# Patient Record
Sex: FEMALE | Race: ASIAN | NOT HISPANIC OR LATINO | Employment: PART TIME | URBAN - METROPOLITAN AREA
[De-identification: names, ages, dates, MRNs, and addresses within clinical notes are randomized per-mention and may not be internally consistent; named-entity substitution may affect disease eponyms.]

---
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
## 2019-11-25 ENCOUNTER — OFFICE VISIT (OUTPATIENT)
Dept: FAMILY MEDICINE | Facility: CLINIC | Age: 19
End: 2019-11-25
Payer: COMMERCIAL

## 2019-11-25 VITALS
WEIGHT: 142 LBS | OXYGEN SATURATION: 100 % | TEMPERATURE: 97.8 F | BODY MASS INDEX: 26.18 KG/M2 | SYSTOLIC BLOOD PRESSURE: 114 MMHG | HEART RATE: 76 BPM | DIASTOLIC BLOOD PRESSURE: 75 MMHG | RESPIRATION RATE: 16 BRPM

## 2019-11-25 DIAGNOSIS — Z23 NEED FOR VACCINATION: ICD-10-CM

## 2019-11-25 DIAGNOSIS — Z00.129 ENCOUNTER FOR ROUTINE CHILD HEALTH EXAMINATION WITHOUT ABNORMAL FINDINGS: Primary | ICD-10-CM

## 2019-11-25 DIAGNOSIS — Z01.00 EXAMINATION OF EYES AND VISION: ICD-10-CM

## 2019-11-25 NOTE — PATIENT INSTRUCTIONS
Thank you for coming to [facility] for your care. It was a pleasure to take care of you!    - Great job on taking good care of your health, KEEP IT UP!  - Try to eat more vegetables.   - Forms filled today.   - I will call you with the results of your test.    Daren Hernandez MD    11/27/19  OPHTHALMOLOGY ADULT REFERRAL    [facility]  Phone:[phone]  Fax:  [phone]    Faxed demographics and referral to [phone], they will contact patient to schedule.     Neeta Woodruff      12/17/19 ADDENDUM  Return fax from [facility] that they have not been able to reach patient. I will send a letter.     Neeta Woodruff

## 2019-11-25 NOTE — PROGRESS NOTES
"Child & Teen Check Up Year 18-20     Health History       Growth Percentile:    Wt Readings from Last 3 Encounters:   11/25/19 64.4 kg (142 lb)   06/08/18 59.9 kg (132 lb) (60 %)*   08/18/16 50.3 kg (111 lb) (26 %)*     * Growth percentiles are based on [facility] (Girls, 2-20 Years) data.      Ht Readings from Last 2 Encounters:   08/12/16 1.568 m (5' 1.75\") (17 %)*     * Growth percentiles are based on [facility] (Girls, 2-20 Years) data.    Normalized BMI data available only for age 0 to 20 years.    Visit Vitals: /75   Pulse 76   Temp 97.8  F (36.6  C) (Oral)   Resp 16   Wt 64.4 kg (142 lb)   LMP 11/25/2019   SpO2 100%   BMI 26.18 kg/m    BP Percentile: Blood pressure percentiles are not available for patients who are 18 years or older.    Informant: Patient    Patient, Family speaks English, Tory and so an  was used.  Family History:   Family History   Problem Relation Age of Onset     Cancer Father      Diabetes No family hx of      Heart Disease No family hx of        Dyslipidemia Screening:  Pediatric hyperlipidemia risk factors discussed today: No increased risk  Lipid screening performed (recommended if any risk factors): No    Social History:     Did the family/guardian worry about wether their food would run out before they got money to buy more? No  Did the family/guardian find that the food they bought didn't last long enough and they didn't have money to get more?  No    Social History     Socioeconomic History     Marital status: Single     Spouse name: None     Number of children: None     Years of education: None     Highest education level: None   Occupational History     None   Social Needs     Financial resource strain: None     Food insecurity:     Worry: None     Inability: None     Transportation needs:     Medical: None     Non-medical: None   Tobacco Use     Smoking status: Never Smoker     Smokeless tobacco: Never Used   Substance and Sexual Activity     Alcohol use: None     " Drug use: None     Sexual activity: None   Lifestyle     Physical activity:     Days per week: None     Minutes per session: None     Stress: None   Relationships     Social connections:     Talks on phone: None     Gets together: None     Attends Caodaism service: None     Active member of club or organization: None     Attends meetings of clubs or organizations: None     Relationship status: None     Intimate partner violence:     Fear of current or ex partner: None     Emotionally abused: None     Physically abused: None     Forced sexual activity: None   Other Topics Concern     None   Social History Narrative     None     Medical History: History reviewed. No pertinent past medical history.    Family History and past Medical History reviewed and unchanged/updated.      Vision Screen: Passed.  Hearing Screen: Passed.  Parental/or patient concerns: None. hre for some forms as well.     Daily Activities:  College at [employer] trying to study MA.   Lives at home parents and 5 siblings. Middle child.   Also works as a CNA.     Nutrition:    Describe intake:   BF: Doesn't eat much breakfast  Lunch: Rice, veges, meats.   Dinner: Same as lunch.      Environmental Risks:  TB exposure: No, does work in a health care facility.   Guns in house: None    STI Screening:  STI (including HIV) risk behaviors discussed today: No  HIV Screening (required once between ages 15-18 yrs): Declined by patient.   Other STI screening preformed (recommended if risk factors): No    Dental:  Have you been to a dentist this year? No and verbally encouraged family to continue to have annual dental check-up.    Mental Health:  Teen Screen Discussed?: Yes         ROS   GENERAL: no recent fevers and activity level has been normal  SKIN: Negative for rash, birthmarks, acne, pigmentation changes  HEENT: Negative for hearing problems, vision problems, nasal congestion, eye discharge and eye redness  RESP: No cough, wheezing, difficulty breathing  CV:  No cyanosis, fatigue with feeding  GI: Normal stools for age, no diarrhea or constipation   : Normal urination, no disharge or painful urination  MS: No swelling, muscle weakness, joint problems  NEURO: Moves all extremeties normally, normal activity for age  ALLERGY/IMMUNE: See allergy in history         Physical Exam:   /75   Pulse 76   Temp 97.8  F (36.6  C) (Oral)   Resp 16   Wt 64.4 kg (142 lb)   LMP 11/25/2019   SpO2 100%   BMI 26.18 kg/m     GENERAL: Alert, well nourished, well developed, no acute distress, interacts appropriately for age  SKIN: skin is clear, no rash, acne, abnormal pigmentation or lesions  HEAD: The head is normocephalic.  EYES:The conjunctivae and cornea normal. PERRL, EOMI, Light reflex is symmetric and no eye movement on cover/uncover test.   EARS: The external auditory canals are clear and the tympanic membranes are normal; gray and transluscent.  NOSE: Clear, no discharge or congestion  MOUTH/THROAT: The throat is clear, tonsils:normal, no exudate or lesions. Normal teeth without obvious abnormalities  NECK: The neck is supple and thyroid is normal, no masses  LUNGS: The lung fields are clear to auscultation,no rales, rhonchi, wheezing or retractions  HEART: The precordium is quiet. Rhythm is regular. S1 and S2 are normal. No murmurs.  ABDOMEN: The bowel sounds are normal. Abdomen soft, non tender,  non distended, no masses or hepatosplenomegaly.  EXTREMITIES: Symmetric extremities, FROM, no deformities. Spine is straight, no scoliosis  NEUROLOGIC: No focal findings. Cranial nerves grossly intact: DTR's normal. Normal gait, strength and tone         Assessment and Plan     Eh was seen today for well child c&tc and forms.    Diagnoses and all orders for this visit:    Encounter for routine child health examination without abnormal findings  Patient coming in today for a well-child visit as well as for forms feeling.  This is completed for her.  Patient also requests a TB  testing, reports that she had done this here about a year ago however unable to see results.  Will order for new TST.  -     SCREENING, VISUAL ACUITY, QUANTITATIVE, BILAT  -     SCREENING TEST, PURE TONE, AIR ONLY  -     Social-emotional screen (PHQ-9) 94461    Examination of eyes and vision  -     OPHTHALMOLOGY ADULT REFERRAL; Future    Other orders  -     TB INTRADERMAL TEST  -     HPV9 (Gardasil 9 )    No referrals were made today.    Patient Health Questionnaire - 9   No concerns. Routine follow-up.    Immunizations:    Hx immunization reactions?  No  Immunization schedule reviewed: Yes:  Following immunizations advised: As above.     Labs:  Urinalysis: once between ages 12 and 20   Hemoglobin: once for menstruating adolescents between ages 12 and 20     I, Daren Hernandez, have discussed patient findings with attending physician Guillermo Vasquez MD. who was agreeable with plan.     Schedule next visit in 2 years    Daren Hernandez MD

## 2019-11-25 NOTE — NURSING NOTE
Well child hearing and vision screening        HEARING FREQUENCY:    For conditioning purpose only  Right ear: 40db at 1000Hz: present    Right Ear:    20db at 1000Hz: present  20db at 2000Hz: present  20db at 4000Hz: present  20db at 6000Hz (11 years and older): present    Left Ear:    20db at 6000Hz (11 years and older): present  20db at 4000Hz: present  20db at 2000Hz: present  20db at 1000Hz: present    Right Ear:    25db at 500Hz: absent    Left Ear:    25db at 500Hz: absent    Hearing Screen:  Fail--Did not hear at least one tone    VISION:  Far vision: Right eye 10/16, Left eye 10/10, with no corrective lens  Plus lens (5 years and older who pass distance screening and do not have corrective lens):  Pass - blurred vision    MAC Juarez

## 2019-11-25 NOTE — PROGRESS NOTES
Preceptor Attestation:   Patient seen, evaluated and discussed with the resident. I have verified the content of the note, which accurately reflects my assessment of the patient and the plan of care.   Supervising Physician:  Real Vasquez MD.

## 2019-11-25 NOTE — LETTER
December 17, 2019       Lin  1972 [address] [address]        Dear ,    [facility] has tried contacting you to schedule the referral(s) below that your doctor submitted for you. Please call the specialities to schedule your appointments or feel free to call me back at [phone] if you need help.     [facility]  Phone: [phone]    Sincerely,    Neeta Woodruff

## 2020-11-09 DIAGNOSIS — Z11.1 SCREENING EXAMINATION FOR PULMONARY TUBERCULOSIS: Primary | ICD-10-CM

## 2020-11-09 PROCEDURE — 36415 COLL VENOUS BLD VENIPUNCTURE: CPT

## 2020-11-09 NOTE — LETTER
November 16, 2020       Lin  1972 [facility] [address]        Dear ,    We are writing to inform you of your test results.    The TB test was negative and normal.  Please follow up in the clinic as needed.     Resulted Orders   TB Quantiferon Gold Plus ([place])   Result Value Ref Range    QTF Result Negative Negative    QTF Interpretation       No interferon-gamma response to M. tuberculosis antigens was detected.  Infecton with M.   tuberculosis is unlikely.  A negative result alone does not exclude infection with M.   tuberculosis      QTF Nil 0.06 IU/mL    QTF Antigen TB1-NIL 0.00 IU/mL    QTF Antigen TB2-NIL 0.00 IU/mL    QTF Mitogen - Nil 8.36 IU/mL    Narrative    Test performed by:  [facility] [address] [place] [address]       If you have any questions or concerns, please call the clinic at the number listed above.       Sincerely,        [facility]

## 2020-11-09 NOTE — PROGRESS NOTES
[facility] presents for a blood draw TB screening test.    TB Screening questions  1. Have you had recent contact with a person with active tuberculosis (TB)?  No, continue to next question.  2. Have you ever been treated for tuberculosis (TB) or latent TB before?  No, continue to next question.  3. Has a county worker or another healthcare worker (not your employer) told you to come in to be tested for TB?  No, continue to next question.  4. Have you had a live vaccine (smallpox, flumist, MMR, varicella, oral polio and/or yellow fever) in the last 4 weeks?  No, continued with lab visit/blood draw.    Educated patient about when to expect the lab results via phone/mail/Codaricahart.    Rene Kelly CMA

## 2020-11-12 ENCOUNTER — TELEPHONE (OUTPATIENT)
Dept: FAMILY MEDICINE | Facility: CLINIC | Age: 20
End: 2020-11-12

## 2020-11-12 NOTE — TELEPHONE ENCOUNTER
[facility] phone call message- patient requesting results:    Test: Lab    Date of test: ?    Additional Comments: CALL BACK.    OK to leave a message on voice mail? Yes       Primary language: Tory      needed? No    Call taken on November 12, 2020 at 11:14 AM by Fausto Valdez

## 2020-11-13 LAB
QTF ANTIGEN TB1-NIL: 0 IU/ML
QTF ANTIGEN TB2-NIL: 0 IU/ML
QTF INTERPRETATION: NORMAL
QTF MITOGEN - NIL: 8.36 IU/ML
QTF NIL: 0.06 IU/ML
QTF RESULT: NEGATIVE

## 2021-08-04 ENCOUNTER — HOSPITAL ENCOUNTER (EMERGENCY)
Facility: HOSPITAL | Age: 21
Discharge: HOME | End: 2021-08-04
Attending: EMERGENCY MEDICINE | Admitting: EMERGENCY MEDICINE
Payer: COMMERCIAL

## 2021-08-04 VITALS
RESPIRATION RATE: 18 BRPM | HEIGHT: 63 IN | DIASTOLIC BLOOD PRESSURE: 75 MMHG | TEMPERATURE: 98.7 F | HEART RATE: 78 BPM | WEIGHT: 135 LBS | BODY MASS INDEX: 23.92 KG/M2 | SYSTOLIC BLOOD PRESSURE: 118 MMHG | OXYGEN SATURATION: 100 %

## 2021-08-04 DIAGNOSIS — T78.40XA ALLERGIC REACTION, INITIAL ENCOUNTER: ICD-10-CM

## 2021-08-04 PROCEDURE — 99283 EMERGENCY DEPT VISIT LOW MDM: CPT

## 2021-08-04 PROCEDURE — 250N000009 HC RX 250: Performed by: EMERGENCY MEDICINE

## 2021-08-04 PROCEDURE — 250N000013 HC RX MED GY IP 250 OP 250 PS 637: Performed by: EMERGENCY MEDICINE

## 2021-08-04 RX ORDER — FAMOTIDINE 20 MG/1
20 TABLET, FILM COATED ORAL 2 TIMES DAILY
Qty: 20 TABLET | Refills: 0 | Status: SHIPPED | OUTPATIENT
Start: 2021-08-04 | End: 2021-08-14

## 2021-08-04 RX ORDER — PREDNISONE 20 MG/1
40 TABLET ORAL DAILY
Qty: 10 TABLET | Refills: 0 | Status: SHIPPED | OUTPATIENT
Start: 2021-08-04 | End: 2021-08-09

## 2021-08-04 RX ORDER — FAMOTIDINE 20 MG/1
20 TABLET, FILM COATED ORAL ONCE
Status: COMPLETED | OUTPATIENT
Start: 2021-08-04 | End: 2021-08-04

## 2021-08-04 RX ORDER — DIPHENHYDRAMINE HCL 50 MG
50 CAPSULE ORAL ONCE
Status: COMPLETED | OUTPATIENT
Start: 2021-08-04 | End: 2021-08-04

## 2021-08-04 RX ORDER — DEXAMETHASONE SODIUM PHOSPHATE 4 MG/ML
10 VIAL (ML) INJECTION ONCE
Status: COMPLETED | OUTPATIENT
Start: 2021-08-04 | End: 2021-08-04

## 2021-08-04 RX ORDER — DIPHENHYDRAMINE HCL 25 MG
50 CAPSULE ORAL EVERY 6 HOURS PRN
Qty: 30 CAPSULE | Refills: 0 | Status: SHIPPED | OUTPATIENT
Start: 2021-08-04 | End: 2024-03-20

## 2021-08-04 RX ORDER — EPINEPHRINE 0.3 MG/.3ML
0.3 INJECTION SUBCUTANEOUS
Qty: 0.6 ML | Refills: 0 | Status: SHIPPED | OUTPATIENT
Start: 2021-08-04 | End: 2024-03-20

## 2021-08-04 RX ADMIN — DEXAMETHASONE SODIUM PHOSPHATE 10 MG: 4 INJECTION, SOLUTION INTRAMUSCULAR; INTRAVENOUS at 05:57

## 2021-08-04 RX ADMIN — DIPHENHYDRAMINE HCL 50 MG: 50 CAPSULE ORAL at 03:49

## 2021-08-04 RX ADMIN — FAMOTIDINE 20 MG: 20 TABLET, FILM COATED ORAL at 03:49

## 2021-08-04 ASSESSMENT — ENCOUNTER SYMPTOMS
SORE THROAT: 0
TROUBLE SWALLOWING: 0
COLOR CHANGE: 1
FACIAL SWELLING: 0

## 2021-08-04 ASSESSMENT — MIFFLIN-ST. JEOR: SCORE: 1341.49

## 2021-08-04 NOTE — ED PROVIDER NOTES
NAME: Tigre Ceballos  AGE: 21 year old female  YOB: 2000  MRN: [id number]  EVALUATION DATE & TIME: No admission date for patient encounter.    PCP: Zora Oneill    ED PROVIDER: Dion Moore M.D.      Chief Complaint   Patient presents with     Rash         FINAL IMPRESSION:  1. Allergic reaction, initial encounter        MEDICAL DECISION MAKING:    3:42 AM I met with the patient, obtained history, performed an initial exam, and discussed options and plan for diagnostics and treatment here in the ED.   5:26 AM I rechecked and updated the patient. Her rash has significantly improved. We discussed the plan for discharge and the patient is agreeable. Reviewed supportive cares, symptomatic treatment, outpatient follow up, and reasons to return to the Emergency Department. Patient to be discharged by ED RN.     Patient was clinically assessed and consented to treatment. After assessment, medical decision making and workup were discussed with the patient. The patient was agreeable to plan for testing, workup, and treatment.  Pertinent Labs & Imaging studies reviewed. (See chart for details)         Tigre Ceballos is a 21 year oldfemale who presents with rash.   Differential diagnosis includes but not limited to allergic reaction, anaphylaxis, cellulitis, food allergy.  Patient with rash since yesterday over 13 hours ago and has no airway involvement.  Examination revealed no edema, no stridor or respiratory symptoms.  Patient otherwise comfortable but does have blotchy hives all over her face, neck, chest, arms, and legs.  She does appear itchy and uncomfortable.  Benadryl given in triage and then patient examined in RP.  Will give Decadron to help with improvement in allergic reaction as well as Pepcid.  After which patient watched and some slight improvement in the itching but rash still present.  Patient from comfortable and with no respiratory symptoms felt comfortable going home.  She will be  placed on prednisone, Pepcid, and Benadryl with an EpiPen as needed.    The importance of close follow up was discussed. We reviewed warning signs and symptoms, and I instructed Ms. Ceballos to return to the emergency department immediately if she develops any new or worsening symptoms. I provided additional verbal discharge instructions. Ms. Ceballos expressed understanding and agreement with this plan of care, her questions were answered, and she was discharged in stable condition.       0 minutes of critical care time    MEDICATIONS GIVEN IN THE EMERGENCY:  Medications   diphenhydrAMINE (BENADRYL) capsule 50 mg (50 mg Oral Given 8/4/21 0349)   dexamethasone (DECADRON) injectable solution used ORALLY 10 mg (10 mg Oral Given 8/4/21 0557)   famotidine (PEPCID) tablet 20 mg (20 mg Oral Given 8/4/21 0349)       NEW PRESCRIPTIONS STARTED AT TODAY'S ER VISIT:  Discharge Medication List as of 8/4/2021  6:39 AM      START taking these medications    Details   diphenhydrAMINE (BENADRYL) 25 MG capsule Take 2 capsules (50 mg) by mouth every 6 hours as needed for itching or allergies, Disp-30 capsule, R-0, Local Print      EPINEPHrine (ANY BX GENERIC EQUIV) 0.3 MG/0.3ML injection 2-pack Inject 0.3 mLs (0.3 mg) into the muscle once as needed for anaphylaxis, Disp-0.6 mL, R-0, Local Print      famotidine (PEPCID) 20 MG tablet Take 1 tablet (20 mg) by mouth 2 times daily for 10 days, Disp-20 tablet, R-0, Local Print      predniSONE (DELTASONE) 20 MG tablet Take 2 tablets (40 mg) by mouth daily for 5 days, Disp-10 tablet, R-0, Local Print                =================================================================    HPI    Patient information was obtained from: patient    Use of : N/A         Tigre Ceballos is a 21 year old female with no relevant past medical history, who presents to the ED via private car for evaluation of a rash.     Today around 1400 (13 hours PTA) patient was at work when she began to endorse a rash that  began on her arms then quickly radiated across her entire body. She now endorses red raised hives to her face, arms, and chest. She does not believe she used any new hygiene products or ate any new foods however, she did order food to eat today. Patient notes she has eaten this food in the past but she has never had a reaction like this before. She does not endorse any itching or swelling her skin and she is able to swallow without difficulty. Patient did not take any medications for her rash and she feels like it has gradually worsened since initial onset. She denies taking any prescription medications and she follows up with her primary physician at [place]. Denies any facial swelling, mouth sores, sore throat, voice change, or any additional symptoms at this time.       REVIEW OF SYSTEMS   Review of Systems   HENT: Negative for facial swelling, mouth sores, sore throat and trouble swallowing.    Skin: Positive for color change (redness) and rash.   All other systems reviewed and are negative.       PAST MEDICAL HISTORY:  No past medical history on file.    PAST SURGICAL HISTORY:  No past surgical history on file.    CURRENT MEDICATIONS:    No current facility-administered medications for this encounter.    Current Outpatient Medications:      diphenhydrAMINE (BENADRYL) 25 MG capsule, Take 2 capsules (50 mg) by mouth every 6 hours as needed for itching or allergies, Disp: 30 capsule, Rfl: 0     EPINEPHrine (ANY BX GENERIC EQUIV) 0.3 MG/0.3ML injection 2-pack, Inject 0.3 mLs (0.3 mg) into the muscle once as needed for anaphylaxis, Disp: 0.6 mL, Rfl: 0     famotidine (PEPCID) 20 MG tablet, Take 1 tablet (20 mg) by mouth 2 times daily for 10 days, Disp: 20 tablet, Rfl: 0     predniSONE (DELTASONE) 20 MG tablet, Take 2 tablets (40 mg) by mouth daily for 5 days, Disp: 10 tablet, Rfl: 0    ALLERGIES:  No Known Allergies    FAMILY HISTORY:  Family History   Problem Relation Age of Onset     Cancer Father      Diabetes  "No family hx of      Heart Disease No family hx of        SOCIAL HISTORY:   Social History     Socioeconomic History     Marital status: Single     Spouse name: Not on file     Number of children: Not on file     Years of education: Not on file     Highest education level: Not on file   Occupational History     Not on file   Tobacco Use     Smoking status: Never Smoker     Smokeless tobacco: Never Used   Substance and Sexual Activity     Alcohol use: Not on file     Drug use: Not on file     Sexual activity: Not on file   Other Topics Concern     Not on file   Social History Narrative     Not on file     Social Determinants of Health     Financial Resource Strain:      Difficulty of Paying Living Expenses:    Food Insecurity:      Worried About Running Out of Food in the Last Year:      Ran Out of Food in the Last Year:    Transportation Needs:      Lack of Transportation (Medical):      Lack of Transportation (Non-Medical):    Physical Activity:      Days of Exercise per Week:      Minutes of Exercise per Session:    Stress:      Feeling of Stress :    Social Connections:      Frequency of Communication with Friends and Family:      Frequency of Social Gatherings with Friends and Family:      Attends [employer] Services:      Active Member of Clubs or Organizations:      Attends Club or Organization Meetings:      Marital Status:    Intimate Partner Violence:      Fear of Current or Ex-Partner:      Emotionally Abused:      Physically Abused:      Sexually Abused:        PHYSICAL EXAM:    Vitals: /75   Pulse 78   Temp 98.7  F (37.1  C) (Temporal)   Resp 18   Ht 1.6 m (5' 3\")   Wt 61.2 kg (135 lb)   LMP 06/04/2021   SpO2 100%   BMI 23.91 kg/m     Physical Exam  Vitals and nursing note reviewed.   Constitutional:       General: She is not in acute distress.     Appearance: Normal appearance. She is normal weight. She is not ill-appearing or toxic-appearing.   HENT:      Head: Normocephalic.      Nose: " Nose normal.      Mouth/Throat:      Mouth: Mucous membranes are moist.      Pharynx: Oropharynx is clear. No oropharyngeal exudate or posterior oropharyngeal erythema.   Eyes:      Extraocular Movements: Extraocular movements intact.      Conjunctiva/sclera: Conjunctivae normal.      Pupils: Pupils are equal, round, and reactive to light.   Cardiovascular:      Rate and Rhythm: Normal rate and regular rhythm.      Heart sounds: Normal heart sounds.   Pulmonary:      Effort: Pulmonary effort is normal. No respiratory distress.      Breath sounds: Normal breath sounds. No stridor. No wheezing, rhonchi or rales.   Musculoskeletal:         General: No swelling or tenderness.      Cervical back: No rigidity or tenderness.   Skin:     General: Skin is warm and dry.      Coloration: Skin is not pale.      Findings: Rash present. No erythema. Rash is urticarial.   Neurological:      General: No focal deficit present.      Mental Status: She is alert.   Psychiatric:         Mood and Affect: Mood normal.           LAB:  All pertinent labs reviewed and interpreted.  Labs Ordered and Resulted from Time of ED Arrival Up to the Time of Departure from the ED - No data to display    RADIOLOGY:  No orders to display       EKG:   None     PROCEDURES:   Procedures       I, Debbi Glez, am serving as a scribe to document services personally performed by Dr. Dion Moore  based on my observation and the provider's statements to me. I, Dion Moore MD attest that Debbi Glez is acting in a scribe capacity, has observed my performance of the services and has documented them in accordance with my direction.      Dion Moore M.D.  Emergency Medicine  [facility] EMERGENCY DEPARTMENT  [facility] [facility] [address]  [phone]  Dept: [phone]     Dion Moore MD  08/04/21 0728

## 2021-08-04 NOTE — ED TRIAGE NOTES
Female patient presents to ED with 12 hour history of generalized rash.  Rash is red and raised all over the body.  No respiratory issues.

## 2021-10-20 ENCOUNTER — OFFICE VISIT (OUTPATIENT)
Dept: FAMILY MEDICINE | Facility: CLINIC | Age: 21
End: 2021-10-20
Payer: COMMERCIAL

## 2021-10-20 VITALS
SYSTOLIC BLOOD PRESSURE: 111 MMHG | RESPIRATION RATE: 16 BRPM | WEIGHT: 142.6 LBS | TEMPERATURE: 98.8 F | OXYGEN SATURATION: 98 % | DIASTOLIC BLOOD PRESSURE: 75 MMHG | BODY MASS INDEX: 25.26 KG/M2 | HEART RATE: 89 BPM

## 2021-10-20 DIAGNOSIS — Z32.00 PREGNANCY EXAMINATION OR TEST, PREGNANCY UNCONFIRMED: ICD-10-CM

## 2021-10-20 DIAGNOSIS — Z32.00 ENCOUNTER FOR CONFIRMATION OF PREGNANCY TEST RESULT WITH PHYSICAL EXAMINATION: Primary | ICD-10-CM

## 2021-10-20 LAB — HCG UR QL: POSITIVE

## 2021-10-20 PROCEDURE — 81025 URINE PREGNANCY TEST: CPT

## 2021-10-20 PROCEDURE — 99213 OFFICE O/P EST LOW 20 MIN: CPT | Mod: GC

## 2021-10-20 RX ORDER — PRENATAL VIT/IRON FUM/FOLIC AC 27MG-0.8MG
1 TABLET ORAL DAILY
Qty: 90 TABLET | Refills: 3 | Status: SHIPPED | OUTPATIENT
Start: 2021-10-20 | End: 2022-01-18

## 2021-10-20 NOTE — PROGRESS NOTES
Assessment & Plan     Pregnancy examination or test, pregnancy unconfirmed  Encounter for confirmation of pregnancy test result with physical examination  Patient presents to confirm pregnancy. LMP was around 5/3/2021. Home pregnancy test was positive in July. She has not seen a medical provider for this pregnancy. No complications to date. Urine pregnancy test positive today. Will initiate process for full OB assessment and management plan.  -schedule first OB visit next week   -schedule OB US on Monday   -prenatal vitamins sent to pharmacy   -patient provided anticipatory guidance   - HCG qualitative urine      Return in about 1 week (around 10/27/2021) for Follow up.     Nicholas Marte DO, PGY1  [facility]    Today I precepted with Dr. Jeanne MD, who agrees with the assessment and plan.    Subjective   Eh is a 21 year old who presents for the following health issues  accompanied by her spouse.  Accompanied by father of the baby    HPI     Patient presents to confirm pregnancy. LMP was around 5/3/2021 . Was not on birth control at the time and was not trying to get pregnant. This is the first pregnancy for patient. She has not seen other healthcare providers for this pregnancy.     Patient denies hypertension, diabetes, heart disease. Reports family history of cancer in father.     Patient was taking prenatal vitamins but discontinued because she was not sure if she should be taking that particular brand.      Patient reports nausea and back pain but no vomiting, headache, abdominal pain, dysuria. She has not experienced bleeding since positive home pregnancy test. Patient craves sour and spicy foods.       Review of Systems   Constitutional, HEENT, cardiovascular, pulmonary, gi and gu systems are negative, except as otherwise noted.      Objective    /75 (BP Location: Left arm, Patient Position: Sitting, Cuff Size: Adult Regular)   Pulse 89   Temp 98.8  F (37.1  C) (Oral)   Resp  16   Wt 64.7 kg (142 lb 9.6 oz)   LMP 05/05/2021 (Approximate)   SpO2 98%   BMI 25.26 kg/m    Body mass index is 25.26 kg/m .  Physical Exam   GENERAL: healthy, alert and no distress  NECK: no adenopathy, no asymmetry, masses, or scars and thyroid normal to palpation  RESP: lungs clear to auscultation - no rales, rhonchi or wheezes  CV: regular rate and rhythm, normal S1 S2, no S3 or S4, no murmur, click or rub, no peripheral edema and peripheral pulses strong  ABDOMEN: soft, nontender, no hepatosplenomegaly, no masses and bowel sounds normal  MS: no gross musculoskeletal defects noted, no edema

## 2021-10-20 NOTE — PROGRESS NOTES
Preceptor Attestation:    I discussed the patient with the resident and evaluated the patient in person. I have verified the content of the note, which accurately reflects my assessment of the patient and the plan of care.   Supervising Physician:  Adolfo Angel MD.

## 2021-10-20 NOTE — PATIENT INSTRUCTIONS
It was great to meet you, eh. Thank you for trusting us with your care.     We will have you return for a first OB visit where we will get prenatal blood tests. This can be scheduled at the . Additionally, we will get you scheduled for an OB US sometime next week.     Congratulations on your pregnancy!     Thank you.

## 2021-10-25 ENCOUNTER — ANESTHESIA EVENT (OUTPATIENT)
Dept: OBGYN | Facility: CLINIC | Age: 21
End: 2021-10-25
Payer: COMMERCIAL

## 2021-10-25 ENCOUNTER — ANCILLARY PROCEDURE (OUTPATIENT)
Dept: ULTRASOUND IMAGING | Facility: CLINIC | Age: 21
End: 2021-10-25
Attending: FAMILY MEDICINE
Payer: COMMERCIAL

## 2021-10-25 ENCOUNTER — HOSPITAL ENCOUNTER (OUTPATIENT)
Facility: CLINIC | Age: 21
Discharge: HOME | End: 2021-10-25
Attending: OBSTETRICS & GYNECOLOGY | Admitting: OBSTETRICS & GYNECOLOGY
Payer: COMMERCIAL

## 2021-10-25 ENCOUNTER — PRE VISIT (OUTPATIENT)
Dept: MATERNAL FETAL MEDICINE | Facility: CLINIC | Age: 21
End: 2021-10-25

## 2021-10-25 ENCOUNTER — ANESTHESIA (OUTPATIENT)
Dept: OBGYN | Facility: CLINIC | Age: 21
End: 2021-10-25
Payer: COMMERCIAL

## 2021-10-25 ENCOUNTER — TRANSCRIBE ORDERS (OUTPATIENT)
Dept: MATERNAL FETAL MEDICINE | Facility: CLINIC | Age: 21
End: 2021-10-25

## 2021-10-25 ENCOUNTER — HOSPITAL ENCOUNTER (OUTPATIENT)
Facility: CLINIC | Age: 21
End: 2021-10-25
Admitting: OBSTETRICS & GYNECOLOGY
Payer: COMMERCIAL

## 2021-10-25 ENCOUNTER — HOSPITAL ENCOUNTER (OUTPATIENT)
Dept: ULTRASOUND IMAGING | Facility: CLINIC | Age: 21
End: 2021-10-25
Attending: OBSTETRICS & GYNECOLOGY
Payer: COMMERCIAL

## 2021-10-25 ENCOUNTER — OFFICE VISIT (OUTPATIENT)
Dept: MATERNAL FETAL MEDICINE | Facility: CLINIC | Age: 21
End: 2021-10-25
Attending: OBSTETRICS & GYNECOLOGY
Payer: COMMERCIAL

## 2021-10-25 ENCOUNTER — OFFICE VISIT (OUTPATIENT)
Dept: FAMILY MEDICINE | Facility: CLINIC | Age: 21
End: 2021-10-25
Payer: COMMERCIAL

## 2021-10-25 VITALS
RESPIRATION RATE: 16 BRPM | OXYGEN SATURATION: 98 % | TEMPERATURE: 97.9 F | DIASTOLIC BLOOD PRESSURE: 61 MMHG | SYSTOLIC BLOOD PRESSURE: 119 MMHG | HEART RATE: 80 BPM

## 2021-10-25 VITALS
BODY MASS INDEX: 25.65 KG/M2 | HEART RATE: 97 BPM | OXYGEN SATURATION: 98 % | WEIGHT: 144.8 LBS | TEMPERATURE: 98.2 F | RESPIRATION RATE: 16 BRPM | DIASTOLIC BLOOD PRESSURE: 63 MMHG | SYSTOLIC BLOOD PRESSURE: 101 MMHG

## 2021-10-25 DIAGNOSIS — O26.879 SHORT CERVIX AFFECTING PREGNANCY: ICD-10-CM

## 2021-10-25 DIAGNOSIS — Z91.89 AT RISK FOR DIABETES MELLITUS: ICD-10-CM

## 2021-10-25 DIAGNOSIS — O26.90 PREGNANCY RELATED CONDITION, ANTEPARTUM: ICD-10-CM

## 2021-10-25 DIAGNOSIS — O09.612 HIGH-RISK FIRST PREGNANCY OF YOUNG WOMAN, SECOND TRIMESTER: ICD-10-CM

## 2021-10-25 DIAGNOSIS — O34.32 CERVICAL INSUFFICIENCY DURING PREGNANCY, ANTEPARTUM, SECOND TRIMESTER: Primary | ICD-10-CM

## 2021-10-25 DIAGNOSIS — Z34.02 ENCOUNTER FOR SUPERVISION OF NORMAL FIRST PREGNANCY IN SECOND TRIMESTER: Primary | ICD-10-CM

## 2021-10-25 DIAGNOSIS — O26.90 PREGNANCY RELATED CONDITION, ANTEPARTUM: Primary | ICD-10-CM

## 2021-10-25 DIAGNOSIS — Z32.00 PREGNANCY EXAMINATION OR TEST, PREGNANCY UNCONFIRMED: ICD-10-CM

## 2021-10-25 DIAGNOSIS — O26.872 SHORT CERVIX DURING PREGNANCY IN SECOND TRIMESTER: Primary | ICD-10-CM

## 2021-10-25 DIAGNOSIS — O26.872 SHORT CERVIX DURING PREGNANCY IN SECOND TRIMESTER: ICD-10-CM

## 2021-10-25 DIAGNOSIS — O34.32 CERVICAL CERCLAGE SUTURE PRESENT IN SECOND TRIMESTER: Primary | ICD-10-CM

## 2021-10-25 LAB
ABO/RH(D): NORMAL
ABO/RH(D): NORMAL
ALBUMIN UR-MCNC: NEGATIVE MG/DL
AMORPH CRY #/AREA URNS HPF: ABNORMAL /HPF
ANTIBODY SCREEN: NEGATIVE
ANTIBODY SCREEN: NEGATIVE
APPEARANCE UR: ABNORMAL
BASOPHILS # BLD AUTO: 0 10E3/UL (ref 0–0.2)
BASOPHILS NFR BLD AUTO: 0 %
BILIRUB UR QL STRIP: NEGATIVE
CLUE CELLS: ABNORMAL
COLOR UR AUTO: ABNORMAL
EOSINOPHIL # BLD AUTO: 0.1 10E3/UL (ref 0–0.7)
EOSINOPHIL NFR BLD AUTO: 2 %
ERYTHROCYTE [DISTWIDTH] IN BLOOD BY AUTOMATED COUNT: 12.2 % (ref 10–15)
ERYTHROCYTE [DISTWIDTH] IN BLOOD BY AUTOMATED COUNT: 12.2 % (ref 10–15)
GLUCOSE BLD-MCNC: 73 MG/DL (ref 79–116)
GLUCOSE UR STRIP-MCNC: NEGATIVE MG/DL
HCT VFR BLD AUTO: 32.6 % (ref 35–47)
HCT VFR BLD AUTO: 33.9 % (ref 35–47)
HGB BLD-MCNC: 11.2 G/DL (ref 11.7–15.7)
HGB BLD-MCNC: 11.5 G/DL (ref 11.7–15.7)
HGB UR QL STRIP: NEGATIVE
HIV 1+2 AB+HIV1 P24 AG SERPL QL IA: NEGATIVE
IMM GRANULOCYTES # BLD: 0.1 10E3/UL
IMM GRANULOCYTES NFR BLD: 1 %
KETONES UR STRIP-MCNC: NEGATIVE MG/DL
LEUKOCYTE ESTERASE UR QL STRIP: NEGATIVE
LYMPHOCYTES # BLD AUTO: 1.3 10E3/UL (ref 0.8–5.3)
LYMPHOCYTES NFR BLD AUTO: 16 %
MCH RBC QN AUTO: 30 PG (ref 26.5–33)
MCH RBC QN AUTO: 31 PG (ref 26.5–33)
MCHC RBC AUTO-ENTMCNC: 33.9 G/DL (ref 31.5–36.5)
MCHC RBC AUTO-ENTMCNC: 34.4 G/DL (ref 31.5–36.5)
MCV RBC AUTO: 89 FL (ref 78–100)
MCV RBC AUTO: 90 FL (ref 78–100)
MONOCYTES # BLD AUTO: 0.7 10E3/UL (ref 0–1.3)
MONOCYTES NFR BLD AUTO: 8 %
MUCOUS THREADS #/AREA URNS LPF: PRESENT /LPF
NEUTROPHILS # BLD AUTO: 6 10E3/UL (ref 1.6–8.3)
NEUTROPHILS NFR BLD AUTO: 73 %
NITRATE UR QL: NEGATIVE
NRBC # BLD AUTO: 0 10E3/UL
NRBC BLD AUTO-RTO: 0 /100
PH UR STRIP: 7 [PH] (ref 5–7)
PLATELET # BLD AUTO: 195 10E3/UL (ref 150–450)
PLATELET # BLD AUTO: 213 10E3/UL (ref 150–450)
RBC # BLD AUTO: 3.61 10E6/UL (ref 3.8–5.2)
RBC # BLD AUTO: 3.83 10E6/UL (ref 3.8–5.2)
RBC URINE: 1 /HPF
SARS-COV-2 RNA RESP QL NAA+PROBE: NEGATIVE
SP GR UR STRIP: 1.02 (ref 1–1.03)
SPECIMEN EXPIRATION DATE: NORMAL
SQUAMOUS EPITHELIAL: <1 /HPF
TRICHOMONAS, WET PREP: ABNORMAL
UROBILINOGEN UR STRIP-MCNC: NORMAL MG/DL
WBC # BLD AUTO: 8.2 10E3/UL (ref 4–11)
WBC # BLD AUTO: 8.3 10E3/UL (ref 4–11)
WBC URINE: 1 /HPF
WBC'S/HIGH POWER FIELD, WET PREP: ABNORMAL
YEAST, WET PREP: ABNORMAL

## 2021-10-25 PROCEDURE — 87340 HEPATITIS B SURFACE AG IA: CPT | Performed by: STUDENT IN AN ORGANIZED HEALTH CARE EDUCATION/TRAINING PROGRAM

## 2021-10-25 PROCEDURE — 250N000011 HC RX IP 250 OP 636: Performed by: STUDENT IN AN ORGANIZED HEALTH CARE EDUCATION/TRAINING PROGRAM

## 2021-10-25 PROCEDURE — 370N000017 HC ANESTHESIA TECHNICAL FEE, PER MIN: Performed by: OBSTETRICS & GYNECOLOGY

## 2021-10-25 PROCEDURE — 76817 TRANSVAGINAL US OBSTETRIC: CPT | Performed by: RADIOLOGY

## 2021-10-25 PROCEDURE — 81001 URINALYSIS AUTO W/SCOPE: CPT | Performed by: STUDENT IN AN ORGANIZED HEALTH CARE EDUCATION/TRAINING PROGRAM

## 2021-10-25 PROCEDURE — 250N000011 HC RX IP 250 OP 636: Performed by: ANESTHESIOLOGY

## 2021-10-25 PROCEDURE — 76817 TRANSVAGINAL US OBSTETRIC: CPT | Mod: 26 | Performed by: OBSTETRICS & GYNECOLOGY

## 2021-10-25 PROCEDURE — 99204 OFFICE O/P NEW MOD 45 MIN: CPT | Mod: 25 | Performed by: OBSTETRICS & GYNECOLOGY

## 2021-10-25 PROCEDURE — 87653 STREP B DNA AMP PROBE: CPT | Performed by: STUDENT IN AN ORGANIZED HEALTH CARE EDUCATION/TRAINING PROGRAM

## 2021-10-25 PROCEDURE — 83036 HEMOGLOBIN GLYCOSYLATED A1C: CPT

## 2021-10-25 PROCEDURE — 86900 BLOOD TYPING SEROLOGIC ABO: CPT | Performed by: STUDENT IN AN ORGANIZED HEALTH CARE EDUCATION/TRAINING PROGRAM

## 2021-10-25 PROCEDURE — 85027 COMPLETE CBC AUTOMATED: CPT | Performed by: OBSTETRICS & GYNECOLOGY

## 2021-10-25 PROCEDURE — 258N000003 HC RX IP 258 OP 636: Performed by: STUDENT IN AN ORGANIZED HEALTH CARE EDUCATION/TRAINING PROGRAM

## 2021-10-25 PROCEDURE — 76805 OB US >/= 14 WKS SNGL FETUS: CPT | Performed by: RADIOLOGY

## 2021-10-25 PROCEDURE — 83655 ASSAY OF LEAD: CPT | Mod: 90 | Performed by: STUDENT IN AN ORGANIZED HEALTH CARE EDUCATION/TRAINING PROGRAM

## 2021-10-25 PROCEDURE — 258N000003 HC RX IP 258 OP 636

## 2021-10-25 PROCEDURE — 250N000009 HC RX 250: Performed by: STUDENT IN AN ORGANIZED HEALTH CARE EDUCATION/TRAINING PROGRAM

## 2021-10-25 PROCEDURE — 99000 SPECIMEN HANDLING OFFICE-LAB: CPT | Performed by: STUDENT IN AN ORGANIZED HEALTH CARE EDUCATION/TRAINING PROGRAM

## 2021-10-25 PROCEDURE — 87086 URINE CULTURE/COLONY COUNT: CPT | Performed by: STUDENT IN AN ORGANIZED HEALTH CARE EDUCATION/TRAINING PROGRAM

## 2021-10-25 PROCEDURE — 96360 HYDRATION IV INFUSION INIT: CPT

## 2021-10-25 PROCEDURE — 36415 COLL VENOUS BLD VENIPUNCTURE: CPT | Performed by: OBSTETRICS & GYNECOLOGY

## 2021-10-25 PROCEDURE — 76817 TRANSVAGINAL US OBSTETRIC: CPT

## 2021-10-25 PROCEDURE — 82947 ASSAY GLUCOSE BLOOD QUANT: CPT | Performed by: STUDENT IN AN ORGANIZED HEALTH CARE EDUCATION/TRAINING PROGRAM

## 2021-10-25 PROCEDURE — 99213 OFFICE O/P EST LOW 20 MIN: CPT | Mod: GC | Performed by: STUDENT IN AN ORGANIZED HEALTH CARE EDUCATION/TRAINING PROGRAM

## 2021-10-25 PROCEDURE — 87210 SMEAR WET MOUNT SALINE/INK: CPT | Performed by: STUDENT IN AN ORGANIZED HEALTH CARE EDUCATION/TRAINING PROGRAM

## 2021-10-25 PROCEDURE — 96361 HYDRATE IV INFUSION ADD-ON: CPT

## 2021-10-25 PROCEDURE — 250N000013 HC RX MED GY IP 250 OP 250 PS 637: Performed by: STUDENT IN AN ORGANIZED HEALTH CARE EDUCATION/TRAINING PROGRAM

## 2021-10-25 PROCEDURE — 710N000010 HC RECOVERY PHASE 1, LEVEL 2, PER MIN: Performed by: OBSTETRICS & GYNECOLOGY

## 2021-10-25 PROCEDURE — 36415 COLL VENOUS BLD VENIPUNCTURE: CPT | Performed by: STUDENT IN AN ORGANIZED HEALTH CARE EDUCATION/TRAINING PROGRAM

## 2021-10-25 PROCEDURE — 86901 BLOOD TYPING SEROLOGIC RH(D): CPT | Performed by: STUDENT IN AN ORGANIZED HEALTH CARE EDUCATION/TRAINING PROGRAM

## 2021-10-25 PROCEDURE — 360N000074 HC SURGERY LEVEL 1, PER MIN: Performed by: OBSTETRICS & GYNECOLOGY

## 2021-10-25 PROCEDURE — U0005 INFEC AGEN DETEC AMPLI PROBE: HCPCS | Performed by: STUDENT IN AN ORGANIZED HEALTH CARE EDUCATION/TRAINING PROGRAM

## 2021-10-25 PROCEDURE — 999N000141 HC STATISTIC PRE-PROCEDURE NURSING ASSESSMENT: Performed by: OBSTETRICS & GYNECOLOGY

## 2021-10-25 PROCEDURE — 85025 COMPLETE CBC W/AUTO DIFF WBC: CPT | Performed by: STUDENT IN AN ORGANIZED HEALTH CARE EDUCATION/TRAINING PROGRAM

## 2021-10-25 PROCEDURE — 87389 HIV-1 AG W/HIV-1&-2 AB AG IA: CPT | Performed by: STUDENT IN AN ORGANIZED HEALTH CARE EDUCATION/TRAINING PROGRAM

## 2021-10-25 PROCEDURE — G0123 SCREEN CERV/VAG THIN LAYER: HCPCS | Performed by: STUDENT IN AN ORGANIZED HEALTH CARE EDUCATION/TRAINING PROGRAM

## 2021-10-25 PROCEDURE — 272N000001 HC OR GENERAL SUPPLY STERILE: Performed by: OBSTETRICS & GYNECOLOGY

## 2021-10-25 PROCEDURE — 258N000003 HC RX IP 258 OP 636: Performed by: NURSE ANESTHETIST, CERTIFIED REGISTERED

## 2021-10-25 PROCEDURE — 86780 TREPONEMA PALLIDUM: CPT | Performed by: STUDENT IN AN ORGANIZED HEALTH CARE EDUCATION/TRAINING PROGRAM

## 2021-10-25 PROCEDURE — 86762 RUBELLA ANTIBODY: CPT | Performed by: STUDENT IN AN ORGANIZED HEALTH CARE EDUCATION/TRAINING PROGRAM

## 2021-10-25 PROCEDURE — G0463 HOSPITAL OUTPT CLINIC VISIT: HCPCS

## 2021-10-25 PROCEDURE — 86787 VARICELLA-ZOSTER ANTIBODY: CPT | Performed by: STUDENT IN AN ORGANIZED HEALTH CARE EDUCATION/TRAINING PROGRAM

## 2021-10-25 PROCEDURE — 76811 OB US DETAILED SNGL FETUS: CPT | Mod: 26 | Performed by: OBSTETRICS & GYNECOLOGY

## 2021-10-25 PROCEDURE — 59320 REVISION OF CERVIX: CPT | Mod: GC | Performed by: OBSTETRICS & GYNECOLOGY

## 2021-10-25 PROCEDURE — 86850 RBC ANTIBODY SCREEN: CPT | Performed by: STUDENT IN AN ORGANIZED HEALTH CARE EDUCATION/TRAINING PROGRAM

## 2021-10-25 PROCEDURE — 87591 N.GONORRHOEAE DNA AMP PROB: CPT | Performed by: STUDENT IN AN ORGANIZED HEALTH CARE EDUCATION/TRAINING PROGRAM

## 2021-10-25 RX ORDER — INDOMETHACIN 25 MG/1
25 CAPSULE ORAL EVERY 6 HOURS
Qty: 2 CAPSULE | Refills: 0 | Status: ON HOLD | OUTPATIENT
Start: 2021-10-25 | End: 2022-02-02

## 2021-10-25 RX ORDER — NALOXONE HYDROCHLORIDE 0.4 MG/ML
0.2 INJECTION, SOLUTION INTRAMUSCULAR; INTRAVENOUS; SUBCUTANEOUS
Status: DISCONTINUED | OUTPATIENT
Start: 2021-10-25 | End: 2021-10-26 | Stop reason: HOSPADM

## 2021-10-25 RX ORDER — NALOXONE HYDROCHLORIDE 0.4 MG/ML
0.4 INJECTION, SOLUTION INTRAMUSCULAR; INTRAVENOUS; SUBCUTANEOUS
Status: DISCONTINUED | OUTPATIENT
Start: 2021-10-25 | End: 2021-10-26 | Stop reason: HOSPADM

## 2021-10-25 RX ORDER — CEFAZOLIN SODIUM 2 G/100ML
2 INJECTION, SOLUTION INTRAVENOUS SEE ADMIN INSTRUCTIONS
Status: DISCONTINUED | OUTPATIENT
Start: 2021-10-25 | End: 2021-10-25

## 2021-10-25 RX ORDER — INDOMETHACIN 25 MG/1
25 CAPSULE ORAL EVERY 6 HOURS
Qty: 2 CAPSULE | Refills: 0 | Status: SHIPPED | OUTPATIENT
Start: 2021-10-25 | End: 2021-10-25

## 2021-10-25 RX ORDER — ONDANSETRON 4 MG/1
4 TABLET, ORALLY DISINTEGRATING ORAL EVERY 30 MIN PRN
Status: DISCONTINUED | OUTPATIENT
Start: 2021-10-25 | End: 2021-10-25

## 2021-10-25 RX ORDER — INDOMETHACIN 25 MG/1
25 CAPSULE ORAL EVERY 6 HOURS
Status: DISCONTINUED | OUTPATIENT
Start: 2021-10-26 | End: 2021-10-26 | Stop reason: HOSPADM

## 2021-10-25 RX ORDER — SODIUM CHLORIDE, SODIUM LACTATE, POTASSIUM CHLORIDE, CALCIUM CHLORIDE 600; 310; 30; 20 MG/100ML; MG/100ML; MG/100ML; MG/100ML
INJECTION, SOLUTION INTRAVENOUS CONTINUOUS
Status: DISCONTINUED | OUTPATIENT
Start: 2021-10-25 | End: 2021-10-25

## 2021-10-25 RX ORDER — CITRIC ACID/SODIUM CITRATE 334-500MG
30 SOLUTION, ORAL ORAL ONCE
Status: COMPLETED | OUTPATIENT
Start: 2021-10-25 | End: 2021-10-25

## 2021-10-25 RX ORDER — CEFAZOLIN SODIUM 2 G/100ML
2 INJECTION, SOLUTION INTRAVENOUS
Status: COMPLETED | OUTPATIENT
Start: 2021-10-25 | End: 2021-10-25

## 2021-10-25 RX ORDER — FENTANYL CITRATE-0.9 % NACL/PF 10 MCG/ML
PLASTIC BAG, INJECTION (ML) INTRAVENOUS CONTINUOUS PRN
Status: DISCONTINUED | OUTPATIENT
Start: 2021-10-25 | End: 2021-10-25

## 2021-10-25 RX ORDER — ACETAMINOPHEN 325 MG/1
650 TABLET ORAL EVERY 6 HOURS PRN
Qty: 60 TABLET | Refills: 0 | Status: SHIPPED | OUTPATIENT
Start: 2021-10-25 | End: 2024-03-20

## 2021-10-25 RX ORDER — BUPIVACAINE HYDROCHLORIDE 7.5 MG/ML
INJECTION, SOLUTION INTRASPINAL
Status: COMPLETED | OUTPATIENT
Start: 2021-10-25 | End: 2021-10-25

## 2021-10-25 RX ORDER — INDOMETHACIN 50 MG/1
100 SUPPOSITORY RECTAL ONCE
Status: COMPLETED | OUTPATIENT
Start: 2021-10-25 | End: 2021-10-25

## 2021-10-25 RX ORDER — METOCLOPRAMIDE HYDROCHLORIDE 5 MG/ML
10 INJECTION INTRAMUSCULAR; INTRAVENOUS ONCE
Status: COMPLETED | OUTPATIENT
Start: 2021-10-25 | End: 2021-10-25

## 2021-10-25 RX ORDER — SODIUM CHLORIDE, SODIUM LACTATE, POTASSIUM CHLORIDE, CALCIUM CHLORIDE 600; 310; 30; 20 MG/100ML; MG/100ML; MG/100ML; MG/100ML
INJECTION, SOLUTION INTRAVENOUS CONTINUOUS PRN
Status: DISCONTINUED | OUTPATIENT
Start: 2021-10-25 | End: 2021-10-25

## 2021-10-25 RX ORDER — SODIUM CHLORIDE, SODIUM LACTATE, POTASSIUM CHLORIDE, CALCIUM CHLORIDE 600; 310; 30; 20 MG/100ML; MG/100ML; MG/100ML; MG/100ML
INJECTION, SOLUTION INTRAVENOUS
Status: COMPLETED
Start: 2021-10-25 | End: 2021-10-25

## 2021-10-25 RX ORDER — CEFAZOLIN SODIUM 2 G/100ML
2 INJECTION, SOLUTION INTRAVENOUS
Status: DISCONTINUED | OUTPATIENT
Start: 2021-10-25 | End: 2021-10-25

## 2021-10-25 RX ORDER — KETOROLAC TROMETHAMINE 30 MG/ML
INJECTION, SOLUTION INTRAMUSCULAR; INTRAVENOUS PRN
Status: DISCONTINUED | OUTPATIENT
Start: 2021-10-25 | End: 2021-10-25

## 2021-10-25 RX ORDER — OXYCODONE HYDROCHLORIDE 5 MG/1
5 TABLET ORAL EVERY 4 HOURS PRN
Status: DISCONTINUED | OUTPATIENT
Start: 2021-10-25 | End: 2021-10-25

## 2021-10-25 RX ORDER — ACETAMINOPHEN 325 MG/1
650 TABLET ORAL EVERY 6 HOURS PRN
Qty: 60 TABLET | Refills: 0 | Status: SHIPPED | OUTPATIENT
Start: 2021-10-25 | End: 2021-10-25

## 2021-10-25 RX ORDER — INDOMETHACIN 25 MG/1
25 CAPSULE ORAL EVERY 6 HOURS
Qty: 3 CAPSULE | Refills: 0 | Status: SHIPPED | OUTPATIENT
Start: 2021-10-25 | End: 2021-10-25

## 2021-10-25 RX ORDER — ONDANSETRON 2 MG/ML
4 INJECTION INTRAMUSCULAR; INTRAVENOUS EVERY 30 MIN PRN
Status: DISCONTINUED | OUTPATIENT
Start: 2021-10-25 | End: 2021-10-25

## 2021-10-25 RX ORDER — ACETAMINOPHEN 325 MG/1
975 TABLET ORAL EVERY 4 HOURS PRN
Status: DISCONTINUED | OUTPATIENT
Start: 2021-10-25 | End: 2021-10-26 | Stop reason: HOSPADM

## 2021-10-25 RX ADMIN — METOCLOPRAMIDE HYDROCHLORIDE 10 MG: 5 INJECTION INTRAMUSCULAR; INTRAVENOUS at 16:59

## 2021-10-25 RX ADMIN — SODIUM CHLORIDE, POTASSIUM CHLORIDE, SODIUM LACTATE AND CALCIUM CHLORIDE: 600; 310; 30; 20 INJECTION, SOLUTION INTRAVENOUS at 16:28

## 2021-10-25 RX ADMIN — PHENYLEPHRINE HYDROCHLORIDE 100 MCG: 10 INJECTION INTRAVENOUS at 17:19

## 2021-10-25 RX ADMIN — CEFAZOLIN 2 G: 10 INJECTION, POWDER, FOR SOLUTION INTRAVENOUS at 17:05

## 2021-10-25 RX ADMIN — SODIUM CHLORIDE, POTASSIUM CHLORIDE, SODIUM LACTATE AND CALCIUM CHLORIDE: 600; 310; 30; 20 INJECTION, SOLUTION INTRAVENOUS at 16:59

## 2021-10-25 RX ADMIN — BUPIVACAINE HYDROCHLORIDE IN DEXTROSE 1.2 ML: 7.5 INJECTION, SOLUTION SUBARACHNOID at 17:08

## 2021-10-25 RX ADMIN — SODIUM CITRATE AND CITRIC ACID MONOHYDRATE 30 ML: 500; 334 SOLUTION ORAL at 16:29

## 2021-10-25 RX ADMIN — PHENYLEPHRINE HYDROCHLORIDE 50 MCG: 10 INJECTION INTRAVENOUS at 17:38

## 2021-10-25 RX ADMIN — Medication 50 MCG/MIN: at 17:16

## 2021-10-25 RX ADMIN — KETOROLAC TROMETHAMINE 30 MG: 30 INJECTION, SOLUTION INTRAMUSCULAR at 17:43

## 2021-10-25 RX ADMIN — SODIUM CHLORIDE, SODIUM LACTATE, POTASSIUM CHLORIDE, CALCIUM CHLORIDE: 600; 310; 30; 20 INJECTION, SOLUTION INTRAVENOUS at 16:28

## 2021-10-25 RX ADMIN — INDOMETHACIN 100 MG: 50 SUPPOSITORY RECTAL at 17:46

## 2021-10-25 RX ADMIN — INDOMETHACIN 25 MG: 25 CAPSULE ORAL at 22:57

## 2021-10-25 ASSESSMENT — ACTIVITIES OF DAILY LIVING (ADL)
FALL_HISTORY_WITHIN_LAST_SIX_MONTHS: NO
TOILETING_ISSUES: NO

## 2021-10-25 ASSESSMENT — ENCOUNTER SYMPTOMS: DYSRHYTHMIAS: 0

## 2021-10-25 NOTE — ANESTHESIA PROCEDURE NOTES
Intrathecal injection Procedure Note    Pre-Procedure   Staff -        Anesthesiologist:  Thu Quintero MD       Resident/Fellow: Shannan Trinidad MD       Performed By: Resident       Location: OR       Pre-Anesthestic Checklist: patient identified, IV checked, site marked, risks and benefits discussed, informed consent, monitors and equipment checked, pre-op evaluation, at physician/surgeon's request and post-op pain management  Timeout:       Correct Patient: Yes        Correct Procedure: Yes        Correct Site: Yes        Correct Position: Yes   Procedure Documentation  Procedure: intrathecal injection       Patient Position: sitting       Skin prep: Chloraprep       Insertion Site: L4-5. (midline approach).       Needle Gauge: 25.        Needle Length (Inches): 3.5        Spinal Needle Type: Marquita tip       Introducer used       Introducer: 20 G       # of attempts: 1 and  # of redirects:  3    Assessment/Narrative         Paresthesias: No.       Sensory Level: T10       CSF fluid: clear.      Opening pressure was cmH2O while  Sitting.      Medication(s) Administered   0.75% Hyperbaric Bupivacaine (Intrathecal), 1.2 mL  Medication Administration Time: 10/25/2021 5:08 PM

## 2021-10-25 NOTE — PROGRESS NOTES
Preceptor Attestation:    I discussed the patient with the resident and evaluated the patient in person. I have verified the content of the note, which accurately reflects my assessment of the patient and the plan of care.   Supervising Physician:  Sherman Quinones MD.

## 2021-10-25 NOTE — OP NOTE
Operative Note: Cervical Cerclage         Pre-Op Diagnosis:   1) Single intrauterine pregnancy at 21w6d  2) Cervical insufficiency by physical exam         Post-Op Diagnosis:   1) Same         Procedure:   1) Modified Shirodkar cervical cerclage, 1 suture (knot at 12 o'clock position)         Surgeons:   Attending: Patrick Gallegos MD  Fellow: AVI Ann Fellow         Anesthesia:   Spinal          Estimated Blood Loss:   50 cc         Findings:   1) Cervix dilated to 1 cm prior to the procedure, closed following the procedure  2) Fetal heart tones confirmed by doptone following the procedure         Specimens:   1) None         Complications:   1) None apparent          History:     Tigre Ceballos is a 21 year oldy.o.  at 21w6d by 21w6d US who presented for cerclage evaluation.  She was seen by her primary care physician where an ultrasound was performed and remarkable for a shortened cervix of 1.5 cm.  She was therefore referred to [facility] for a repeat ultrasound which was consistent with shortened cervix but at a length of 4.2 mm.  Patient was otherwise asymptomatic. After counseling regarding these findings, the patient has decided to proceed with physical exam indicated cerclage.         Details of Procedure:   After administration of spinal anesthesia the patient was placed in the dorsal lithotomy position and prepped and draped in the usual fashion.    Straight catheterization of the bladder was completed with return of yellow urine.  A weighted speculum was then placed and the cervix and vagina were again prepped with betadine under direct visualization.  The cervix was noted to be relatively short and dilated to fingertip-1 cm. Both anterior and posterior lip of the cervix were then grasped with ring forceps for assistance with mobilization.     Examination of the posterior cervix was performed and enough space was noted. Thereafter attention was turned to the anterior cervix. The vaginal mucosa just  distal to the cervicovaginal reflection was transected in a transverse fashion with Metzenbaum scissors, approximately 2 cm wide.  The bladder was the dissected off of the anterior cervix bluntly using the 's digit.    Therefore, once the bladder was sufficiently advanced, a circumferential suture of #2 Ethilon was placed high on the cervix in the newly created space with knot tied at 12 o'clock position. The position of the stitch was confirmed to be satisfactory.  The suture was tied down securely and the tail was trimmed to approximately 2 cm.  The anterior vaginal mucosa was closed with 2-0 vicryl running interlocking manner with good hemostasis.  At the end the cervix was visually and digitally closed.  A rectal exam revealed no sutures.     She was taken to the recovery room in stable condition.  Sponge and needle counts were correct at the end of the procedure.    Tabitha Browne MD  Maternal Fetal Medicine Fellow  10/25/2021 6:04 PM

## 2021-10-25 NOTE — ANESTHESIA PREPROCEDURE EVALUATION
Anesthesia Pre-Procedure Evaluation    Patient: Tigre ORTEGA Lin   MRN: [id number] : 2000        Preoperative Diagnosis: * No pre-op diagnosis entered *    Procedure : Procedure(s):  CERCLAGE, CERVIX, VAGINAL APPROACH          No past medical history on file.   No past surgical history on file.   No Known Allergies   Social History     Tobacco Use     Smoking status: Never Smoker     Smokeless tobacco: Never Used   Substance Use Topics     Alcohol use: Not on file      Wt Readings from Last 1 Encounters:   10/25/21 65.7 kg (144 lb 12.8 oz)        Anesthesia Evaluation   Pt has not had prior anesthetic         ROS/MED HX  ENT/Pulmonary:  - neg pulmonary ROS  (-) recent URI (covid neg 10/25/21)   Neurologic:  - neg neurologic ROS     Cardiovascular:  - neg cardiovascular ROS  (-) WEEKS, arrhythmias and irregular heartbeat/palpitations   METS/Exercise Tolerance:     Hematologic:  - neg hematologic  ROS     Musculoskeletal:  - neg musculoskeletal ROS     GI/Hepatic:  - neg GI/hepatic ROS  (-) GERD and liver disease   Renal/Genitourinary:  - neg Renal ROS  (-) renal disease   Endo:  - neg endo ROS  (-) Type I DM, Type II DM and thyroid disease   Psychiatric/Substance Use:  - neg psychiatric ROS  (-) psychiatric history   Infectious Disease:  - neg infectious disease ROS     Malignancy:  - neg malignancy ROS     Other:  - neg other ROS    (+) Possibly pregnant (Pregnant), ,         Physical Exam    Airway        Mallampati: II   TM distance: > 3 FB   Neck ROM: full   Mouth opening: > 3 cm    Respiratory Devices and Support         Dental  no notable dental history         Cardiovascular   cardiovascular exam normal       Rhythm and rate: regular and normal     Pulmonary   pulmonary exam normal        breath sounds clear to auscultation           OUTSIDE LABS:  CBC:   Lab Results   Component Value Date    WBC 8.3 10/25/2021    HGB 11.5 (L) 10/25/2021    HCT 33.9 (L) 10/25/2021     10/25/2021     BMP: No results found  for: NA, POTASSIUM, CHLORIDE, CO2, BUN, CR, GLC  COAGS: No results found for: PTT, INR, FIBR  POC:   Lab Results   Component Value Date    HCG Positive (A) 10/20/2021     HEPATIC: No results found for: ALBUMIN, PROTTOTAL, ALT, AST, GGT, ALKPHOS, BILITOTAL, BILIDIRECT, MAXI  OTHER: No results found for: PH, LACT, A1C, CHERYL, PHOS, MAG, LIPASE, AMYLASE, TSH, T4, T3, CRP, SED    Anesthesia Plan    ASA Status:  2, emergent    NPO Status:  ELEVATED Aspiration Risk/Unknown    Anesthesia Type: Spinal.              Consents    Anesthesia Plan(s) and associated risks, benefits, and realistic alternatives discussed. Questions answered and patient/representative(s) expressed understanding.     - Discussed with:  Patient      - Extended Intubation/Ventilatory Support Discussed: No.      - Patient is DNR/DNI Status: No    Use of blood products discussed: No .     Postoperative Care       PONV prophylaxis: Ondansetron (or other 5HT-3)     Comments:    Reglan 10 mg ordered, unable to wait 8 hours for patient to be NPO from 12 inch [facility] at noon.            Thu Quintero MD

## 2021-10-25 NOTE — ANESTHESIA CARE TRANSFER NOTE
Patient: Tigre ORTEGA Lin    Procedure: Procedure(s):  CERCLAGE, CERVIX, VAGINAL APPROACH       Diagnosis: * No pre-op diagnosis entered *  Diagnosis Additional Information: No value filed.    Anesthesia Type:   Spinal     Note:    Oropharynx: oropharynx clear of all foreign objects and spontaneously breathing  Level of Consciousness: awake  Oxygen Supplementation: room air    Independent Airway: airway patency satisfactory and stable  Dentition: dentition unchanged  Vital Signs Stable: post-procedure vital signs reviewed and stable  Report to RN Given: handoff report given  Patient transferred to: PACU    Handoff Report: Identifed the Patient, Identified the Reponsible Provider, Reviewed the pertinent medical history, Discussed the surgical course, Reviewed Intra-OP anesthesia mangement and issues during anesthesia, Set expectations for post-procedure period and Allowed opportunity for questions and acknowledgement of understanding      Vitals:  Vitals Value Taken Time   BP 95/57 10/25/21 1817   Temp 36.6  C (97.9  F) 10/25/21 1800   Pulse 79 10/25/21 1825   Resp 16 10/25/21 1815   SpO2 99 % 10/25/21 1825   Vitals shown include unvalidated device data.    Electronically Signed By: Shannan Trinidad MD  October 25, 2021  6:26 PM

## 2021-10-25 NOTE — PATIENT INSTRUCTIONS
Maternal Fetal Medicine  [address] [place] [address] [address]  [place], [place]     Phone: [phone]    APPT: TODAY, 10/25/21 at 1:30 PM consult due to short cervix.    Office is located between [facility] and [facility].  Can park anywhere in the parking lot off of [address] and need to enter in center door of [address].

## 2021-10-25 NOTE — PROGRESS NOTES
Please see full imaging report from [facility] program under imaging tab.    Thank-you for referring your patient for a comprehensive ultrasound due to suspected short cervix. She was seen for her first prenatal visit today, and has an unsure LMP, likely in , and is 21 weeks 6 days by her initial US today.    She had prenatal labs drawn at her first visit today but no aneuploidy screening has been done thus far, as she is just starting care today.     I discussed the findings on today's ultrasound with the patient and her partner. She has no known medication allergies. She has had no cervical surgery or prior pregnancies. Of note, her sister has had  deliveries.     I reviewed the normal appearing fetus on US but our concern that her cervix is very short and may even be opening, placing her at risk of very  birth, including in the periviable period. I am more concerned given her family history of premature birth as well. She did have a sterile speculum exam with her primary OB provider today and the cervix was reported as visually closed, so this was not repeated today. Although her cervix remains closed at least on US, given this very short residual closed  length, and after my review with our on call M physician Dr. Gallegos, shared-decision making with the patient led to the decision to recommend a cervical cerclage. We briefly reviewed the option for vaginal progesterone, but I worry that if this is unsuccessful, at this very short cervical length, that a cerclage may be more difficult to place. She denies any contractions or vaginal bleeding. She last ate at 1200 pm today. She and her partner are in agreement with plan to go to [facility] for evaluation for cerclage.     Labs including UA, GC, CT are all pending from her first OB visit today.     Follow up will depend on the outcome of her evaluation and potential procedure today.     Wero Nelson MD  Maternal Fetal Medicine

## 2021-10-25 NOTE — NURSING NOTE
Dr. Nelson in communication with Dr. Gallegos (on call [facility]) regarding wanting to admit patient for evaluation of possible cerclage placement.  Dr. Nelson also communicated with [facility] L&D (KELLEY Campbell) to let them know patient will be coming directly from our [facility].

## 2021-10-25 NOTE — PROGRESS NOTES
2021?     MsQuan Lin is admitted for short cervix on outside scan. Initial assessment at PCP reported CL of 1.5 cm, follow up at [facility] reported CL 4 mm. Patient reports no cramping, pain or vaginal bleeding.She is a 21 year old  who is currently?at 21 weeks and 6 days by second trimester US performed today with DESTIN 3/01/2022. Patient does not reliably recall LMP.  She has been referred for physical exam indicated cerclage.    Discussion:?     We discussed the concurrent factors that lead to  birth that include cervical shortening and dilation,  PROM and  labor. These can act?independently,?or each one can lead to the development of one of the others. Treatment is directed towards addressing each one of these risk factors.?    Placement of cervical cerclage can decrease risk of PTB prior to 32 weeks by 70%. Cerclage can prevent cervical dilation and prolapse of the BOW, however it does not prevent PROM or PTL and if these complications develop, would require removal of the cerclage. Placement of the cerclage can decrease risk for PROM by avoiding exposure of the membranes to the vaginal mileau.    Patient is aware of increased risk for recurrence in future pregnancies with increased risk of delivery  and delivery prior to 24 weeks. The interventions to prevent  delivery include: prophylactic use of 17-OH progesterone caproate from 16 to 36 weeks and use of cervical cerclage. An alternative to cerclage would be use of vaginal progesterone. I have recommended that since cervical shortening has progressed to less than 15 mm I would recommend placement of the cerclage. I have also discussed that although there is no direct intervention to prevent PROM, avoiding excessive cervical shortening can decrease the risk.    We discussed surveillance during a future pregnancy and use of measurement of CL between 18 and 24 weeks to assess risk for  recurrent  birth. CL less than 25 mm is a risk factor for  birth and would justify either option of cerclage or vaginal progesterone.?An alternative option would be to offer placement of a history indicated cerclage at 12-14 weeks.    We?also?discuss 17-OHP in a future pregnancy if  birth occurs in this pregnancy.??We reviewed?initial recommendations for use of 17-OH progesterone caproate were women with a history of spontaneous  birth from any cause. In the Meis Study from , the group identified as being at highest risk for  birth were: history of  birth prior to 32 weeks,  ethnicity or history of recurrent spontaneous  births.???     Contrasting these factors between the two studies:??     The percentage of  women was 60% in the Meis study and 6 to 7% in the PROLONG study.???     The rate of PTB <32 weeks in the Meis study was 11% in the treated group and 20% in the placebo group, while the rate of  birth at <32 weeks was 3.4-3.8 % in the PROLONG?study.???     The rate of PTB <35 weeks was 20% (progesterone) and 30% (placebo) in the Meis study, while in the PROLONG study it was 11% for both groups.???     The mean gestational age at qualifying delivery for the Meis study was 30-31 weeks while in the PROLONG study it was 32-33 weeks.???     There?appear to be?two different populations with different risk profiles, and the main differences appear to be the ethnicity of the study population and risk for PTB based a priori risk for  birth, and gestational age of qualifying birth. If there is a benefit to use,?it?is probably?greater among women with earlier and higher number of  births, as well as those of  descent.??     At conclusion of our conversation, patient?elected?to proceed with cerclage today.    Recommendations:?     1. NPO  2. Swab for GC, CT, GBS and wet prep  3. Indomethacin 100 mg DC  followed by 25 mg po every 6 hours  4. Cephalosporin 2 grams on call to OR    Patrick Gallegos M.D.  Maternal Fetal Medicine      ?     ??

## 2021-10-25 NOTE — PROGRESS NOTES
Assessment & Plan     1. Encounter for supervision of normal first pregnancy in second trimester  2. Short cervix affecting pregnancy  22 yo  at about 22 weeks based on 22 week ultrasound today. Found to have shortened cervix to 1.4 cm with funneling. Feeling well without signs/symptoms of infection or impending delivery. Cervix closed on exam.  She denies any subjective contractions, but concern for contractions during ultrasound because of changing cervix length. Gayle BENSON communicated with [facility] and scheduled an appointment today for further evaluation and possible cerclage versus progesterone supplementation. Has first OB visit scheduled for 10/27 with Dr. Marte.   - ABO/Rh Type-HML; Future  - Antibody Screen; Future  - Chlamydia/Gono Amplified  - CBC with Plt; Future  - Culture Urine; Future  - Hepatitis B Surface Ag; Future  - HIV Ag/Ab Screen Cascade; Future  - Lead, Blood; Future  - Rubella Antibody IgG; Future  - Syphilis Screen Cascade; Future  - Glucose whole blood; Future  - ABO/Rh Type-HML  - Antibody Screen  - CBC with Plt  - Culture Urine  - Hepatitis B Surface Ag  - HIV Ag/Ab Screen Cascade  - Lead, Blood  - Rubella Antibody IgG  - Syphilis Screen Cascade  - Glucose whole blood  - Wet preparation  - GYN Cytology  - Mat Fetal Med Ctr Referral - Pregnancy; Future      Discussion of management or test interpretation with external physician/other qualified healthcare professional/appropriate source - [facility]  Ordering of each unique test  45 minutes spent on the date of the encounter doing chart review, history and exam, documentation and further activities per the note    Felicitas Sewell MD PGY3  [facility]    Discussed case with Dr. Quinones.     Subjective   Eh is a 21 year old who presents for the following health issues  accompanied by her .    HPI     Presents for dating ultrasound today. On ultrasound was found to have shortened cervix (1.4 cm) with funneling.     She  initially reported LMP as 5/3/21. However, today she states she thinks it was probably in June. DESTIN based on verbal report for radiology tech today is that she is around 22 weeks pregnant.     She denies any bleeding, spotting, contractions, abdominal pain, nausea, vomiting, vaginal discharge, dyuria. She has not been working or exercising since she found out that she was pregnant. Reports that she otherwise feels normal.     This is her first pregnancy.     Review of Systems   Constitutional, HEENT, cardiovascular, pulmonary, gi and gu systems are negative, except as otherwise noted.      Objective    /63 (BP Location: Right arm, Patient Position: Sitting, Cuff Size: Adult Regular)   Pulse 97   Temp 98.2  F (36.8  C) (Oral)   Resp 16   Wt 65.7 kg (144 lb 12.8 oz)   SpO2 98%   BMI 25.65 kg/m    Body mass index is 25.65 kg/m .  Physical Exam   GENERAL: healthy, alert and no distress  RESP: lungs clear to auscultation - no rales, rhonchi or wheezes  CV: regular rate and rhythm, normal S1 S2, no S3 or S4, no murmur, click or rub, no peripheral edema and peripheral pulses strong  ABDOMEN: soft, nontender, no hepatosplenomegaly, gravid uterus   (female): normal female external genitalia, normal urethral meatus , vaginal mucosa pink, moist, well rugated, white physiologic vaginal discharge, closed cervix. Small amount of bleeding from cervical transition zone after pap collected.   MS: no gross musculoskeletal defects noted, no edema  PSYCH: mentation appears normal, affect normal/bright    Results for orders placed or performed in visit on 10/25/21 (from the past 24 hour(s))   CBC with Plt   Result Value Ref Range    WBC Count 8.3 4.0 - 11.0 10e3/uL    RBC Count 3.83 3.80 - 5.20 10e6/uL    Hemoglobin 11.5 (L) 11.7 - 15.7 g/dL    Hematocrit 33.9 (L) 35.0 - 47.0 %    MCV 89 78 - 100 fL    MCH 30.0 26.5 - 33.0 pg    MCHC 33.9 31.5 - 36.5 g/dL    RDW 12.2 10.0 - 15.0 %    Platelet Count 195 150 - 450 10e3/uL    Glucose whole blood   Result Value Ref Range    Glucose Whole Blood 73 (L) 79 - 116 mg/dL

## 2021-10-25 NOTE — PROGRESS NOTES
2021?     HPI:   MsBranden?Eh Lin is admitted for short cervix on outside scan. Initial assessment at PCP reported CL of 1.5 cm, follow up at [facility] reported CL 4 mm. Patient reports no cramping, pain, fluid leakage or vaginal bleeding. She is a 21 year old  who is currently?at 21 weeks and 6 days by second trimester US performed today with DESTIN 3/01/2022. Patient does not reliably recall LMP. She has been referred for physical exam indicated cerclage.     Pt reports she does not have any chronic medical conditions, is only taking a daily prenatal vitamin. She states she has thus far felt well during this pregnancy, specifically denies any nausea, vomiting, indigestion, or dysuria. She has no past surgical history. Does affirm a family history of diabetes and hypertension in her mother and an unknown type of cancer in her father. Pt states she does not smoke cigarettes, use tobacco, or any other recreational substances. Pt reports she has not drank any alcohol since becoming pregnant.     ROS: Negative except as noted in HPI.     Physical Exam:   General: Healthy, young female in no acute distress  Respiratory: Non-labored breathing on room air  Extremities: No lower extremity edema bilaterally  Pelvic: External genitalia appear normal. On speculum exam, there was some yeast noted in the vaginal canal but it otherwise appeared normal. Vaginal discharge was milky-white without odor and appeared normal. Her cervix appeared to be slightly dilated, but externally was normal without any concerning lesions or inflammation.     Labs:   Component      Latest Ref Rng & Units 10/25/2021   WBC      4.0 - 11.0 10e3/uL 8.3   RBC Count      3.80 - 5.20 10e6/uL 3.83   Hemoglobin      11.7 - 15.7 g/dL 11.5 (L)   Hematocrit      35.0 - 47.0 % 33.9 (L)   MCV      78 - 100 fL 89   MCH      26.5 - 33.0 pg 30.0   MCHC      31.5 - 36.5 g/dL 33.9   RDW      10.0 - 15.0 % 12.2   Platelet Count      150 -  450 10e3/uL 195     Component      Latest Ref Rng & Units 10/25/2021   Glucose      79 - 116 mg/dL 73 (L)     Component      Latest Ref Rng & Units 10/25/2021   Trichomonas      Absent Absent   Yeast      Absent Absent   Clue cells      Absent Absent   WBCs/high power field      None 1+ (A)   COVID-19 testing: pending.  UA with culture: pending.  ABO/RH type: pending.     Imaging:     U/S (10/25/21):   IMPRESSION:    1.  Single living intrauterine gestation.  2.  Based on this ultrasound, composite age of 21 weeks 6 days with EDC 3/1/22. Any earlier imaging is not available at this time.  3.  Normal fetal survey.  4.  Shortened cervix with funneling. 1.4 cm cervical length.  Fetal Heart Rate: 147 bpm    MFM  U/S Comprehensive (10/25/21):   IMPRESSION:  1) Theodore intrauterine pregnancy at 21w 6d gestational age as dated by her first US today with DESTIN 2022. She had an unsure LMP.  2) None of the anomalies commonly detected by ultrasound were evident in the detailed fetal anatomic survey as described above.  3) Growth parameters and estimated fetal weight were consistent with established dates.  4) The amniotic fluid volume appeared normal.  5) Normal fetal activity for gestational age.  6) Transvaginal imaging was performed due to suspected short cervix. The cervix is noted to be short with only 4.2 mm of residual closed cervix but funneled cervical length  superior to this.    Discussion:?     We discussed the concurrent factors that lead to  birth that include cervical shortening and dilation,  PROM and  labor. These can act?independently,?or each one can lead to the development of one of the others. Treatment is directed towards addressing each one of these risk factors.?    Placement of cervical cerclage can decrease risk of PTB prior to 32 weeks by 70%. Cerclage can prevent cervical dilation and prolapse of the BOW, however it does not prevent PROM or PTL and if these complications  develop, would require removal of the cerclage. Placement of the cerclage can decrease risk for PROM by avoiding exposure of the membranes to the vaginal mileau.    Patient is aware of increased risk for recurrence in future pregnancies with increased risk of delivery  and delivery prior to 24 weeks. The interventions to prevent  delivery include: prophylactic use of 17-OH progesterone caproate from 16 to 36 weeks and use of cervical cerclage. An alternative to cerclage would be use of vaginal progesterone. I have recommended that since cervical shortening has progressed to less than 15 mm I would recommend placement of the cerclage. I have also discussed that although there is no direct intervention to prevent PROM, avoiding excessive cervical shortening can decrease the risk.    We discussed surveillance during a future pregnancy and use of measurement of CL between 18 and 24 weeks to assess risk for recurrent  birth. CL less than 25 mm is a risk factor for  birth and would justify either option of cerclage or vaginal progesterone.?An alternative option would be to offer placement of a history indicated cerclage at 12-14 weeks.    We?also?discuss 17-OHP in a future pregnancy if  birth occurs in this pregnancy.??We reviewed?initial recommendations for use of 17-OH progesterone caproate were women with a history of spontaneous  birth from any cause. In the Meis Study from , the group identified as being at highest risk for  birth were: history of  birth prior to 32 weeks,  ethnicity or history of recurrent spontaneous  births.???     Contrasting these factors between the two studies:??     The percentage of  women was 60% in the Meis study and 6 to 7% in the PROLONG study.???     The rate of PTB <32 weeks in the Meis study was 11% in the treated group and 20% in the placebo group, while the rate of  birth at <32  weeks was 3.4-3.8 % in the PROLONG?study.???     The rate of PTB <35 weeks was 20% (progesterone) and 30% (placebo) in the Meis study, while in the PROLONG study it was 11% for both groups.???     The mean gestational age at qualifying delivery for the Meis study was 30-31 weeks while in the PROLONG study it was 32-33 weeks.???     There?appear to be?two different populations with different risk profiles, and the main differences appear to be the ethnicity of the study population and risk for PTB based a priori risk for  birth, and gestational age of qualifying birth. If there is a benefit to use,?it?is probably?greater among women with earlier and higher number of  births, as well as those of  descent.??     At conclusion of our conversation, patient?elected?to proceed with cerclage today.    Recommendations:?     1. NPO  2. Swab for GC, CT, GBS and wet prep  3. Indomethacin 100 mg ND followed by 25 mg po every 6 hours  4. Cephalosporin 2 grams on call to OR  5. COVID-19 PCR    Patrick Gallegos  Date of Service (when I saw the patient): 10/25/21    Time Spent on this Encounter   I, Patrick Gallegos, spent a total of 45 minutes bedside and on the inpatient unit today managing the care of Tigre Ceballos.  Over 50% of my time on the unit was spent counseling the patient and /or coordinating care regarding placement of cerclage. See note for details.      ?     ??

## 2021-10-25 NOTE — PROGRESS NOTES
{PROVIDER CHARTING PREFERENCE:919061}    Subjective   Eh is a 21 year old who presents for the following health issues {ACCOMPANIED BY STATEMENT (Optional):265983}    HPI   Not    {SUPERLIST (Optional):846400}  {additonal problems for provider to add (Optional):006617}    Review of Systems   {ROS COMP (Optional):980951}      Objective    /63 (BP Location: Right arm, Patient Position: Sitting, Cuff Size: Adult Regular)   Pulse 97   Temp 98.2  F (36.8  C) (Oral)   Resp 16   Wt 65.7 kg (144 lb 12.8 oz)   SpO2 98%   BMI 25.65 kg/m    Body mass index is 25.65 kg/m .  Physical Exam   {Exam List (Optional):277705}    {Diagnostic Test Results (Optional):049024}    {AMBULATORY ATTESTATION (Optional):582194}

## 2021-10-26 PROBLEM — O26.879 SHORT CERVIX AFFECTING PREGNANCY: Status: RESOLVED | Noted: 2021-10-25 | Resolved: 2021-10-26

## 2021-10-26 LAB
BACTERIA UR CULT: NO GROWTH
GP B STREP DNA SPEC QL NAA+PROBE: NEGATIVE
HBV SURFACE AG SERPL QL IA: NONREACTIVE
N GONORRHOEA DNA SPEC QL NAA+PROBE: NEGATIVE
PATIENT PENICILLIN, AMOXICILLIN, CEPHALOSPORINS ALLERGY: NO
RUBV IGG SERPL QL IA: POSITIVE
T PALLIDUM AB SER QL: NEGATIVE

## 2021-10-26 NOTE — ANESTHESIA POSTPROCEDURE EVALUATION
Patient: Tigre Ceballos    Procedure: Procedure(s):  CERCLAGE, CERVIX, VAGINAL APPROACH       Diagnosis:* No pre-op diagnosis entered *  Diagnosis Additional Information: No value filed.    Anesthesia Type:  Spinal    Note:  Disposition: Outpatient   Postop Pain Control: Uneventful            Sign Out: Well controlled pain   PONV: No   Neuro/Psych: Uneventful            Sign Out: Acceptable/Baseline neuro status   Airway/Respiratory: Uneventful            Sign Out: Acceptable/Baseline resp. status   CV/Hemodynamics: Uneventful            Sign Out: Acceptable CV status; No obvious hypovolemia; No obvious fluid overload   Other NRE: NONE   DID A NON-ROUTINE EVENT OCCUR? No           Last vitals:  Vitals Value Taken Time   /60 10/25/21 1900   Temp 36.6  C (97.9  F) 10/25/21 1800   Pulse 80 10/25/21 1900   Resp 16 10/25/21 1845   SpO2 99 % 10/25/21 1900     Patient Vitals for the past 24 hrs:   BP Temp Temp src Pulse Resp SpO2   10/25/21 2015 119/61 -- -- -- 16 98 %   10/25/21 1900 106/60 -- -- 80 -- 99 %   10/25/21 1845 100/61 -- -- 83 16 99 %   10/25/21 1830 95/62 -- -- 77 16 99 %   10/25/21 1815 95/57 -- -- 75 16 99 %   10/25/21 1800 99/43 36.6  C (97.9  F) Oral -- 16 100 %   10/25/21 1755 99/40 -- -- -- 16 --   10/25/21 1540 107/59 36.9  C (98.4  F) Oral -- 16 --       Electronically Signed By: Thu Quintero MD  October 25, 2021  9:57 PM

## 2021-10-26 NOTE — DISCHARGE INSTRUCTIONS
Discharge Instruction for Undelivered Patients      You were seen for: Cerclage  We Consulted: Dr. Gallegos  You had (Test or Medicine): Fetal monitoring and cerclage placement     Diet:   Drink 8 to 12 glasses of liquids (milk, juice, water) every day.  You may eat meals and snacks.     Activity:  Rest the pelvic area. No sex. Do not stimulate breasts or nipples.  Call your doctor or nurse midwife if your baby is moving less than usual.     Call your provider if you notice:  Swelling in your face or increased swelling in your hands or legs.  Headaches that are not relieved by Tylenol (acetaminophen).  Changes in your vision (blurring: seeing spots or stars.)  Nausea (sick to your stomach) and vomiting (throwing up).   Weight gain of 5 pounds or more per week.  Heartburn that doesn't go away.  Signs of bladder infection: pain when you urinate (use the toilet), need to go more often and more urgently.  The bag of vaughn (rupture of membranes) breaks, or you notice leaking in your underwear.  Bright red blood in your underwear.  Abdominal (lower belly) or stomach pain.  For first baby: Contractions (tightening) less than 5 minutes apart for one hour or more.  Second (plus) baby: Contractions (tightening) less than 10 minutes apart and getting stronger.  *If less than 34 weeks: Contractions (tightening) more than 6 times in one hour.  Increase or change in vaginal discharge (note the color and amount)    Follow-up:  As scheduled in the clinic   Medication at your pharmacy in the morning

## 2021-10-26 NOTE — PLAN OF CARE
Pt arrived from clinic, admission complete, IV placed and prepped for surgery. Cerclage placed at 1730, pt tolerated well. Pt stable post-op, able to tolerate eating and drinking, pt up to ambulate and void at 2230 when numbness worn off. 0000 scheduled indocin dose given at 2300 with provider's approval. Discharge instructions given, IV removed and pt discharged ambulatory at 2305.

## 2021-10-26 NOTE — OR NURSING
Pt to PACU via cart.  VSS, temp 97.9, LR infusing by gravity to piv without complications. denies pain and nausea. I)IV to pump, compression to pneumoboots re-started.  A) Stable P) pt to inform RN if she experiences pain or nausea.  Post op cares.   Anticipate transfer to room 456 at ~1hr post op.

## 2021-10-27 ENCOUNTER — ALLIED HEALTH/NURSE VISIT (OUTPATIENT)
Dept: FAMILY MEDICINE | Facility: CLINIC | Age: 21
End: 2021-10-27
Payer: COMMERCIAL

## 2021-10-27 ENCOUNTER — OFFICE VISIT (OUTPATIENT)
Dept: FAMILY MEDICINE | Facility: CLINIC | Age: 21
End: 2021-10-27
Payer: COMMERCIAL

## 2021-10-27 VITALS
TEMPERATURE: 98.3 F | HEART RATE: 97 BPM | WEIGHT: 147 LBS | DIASTOLIC BLOOD PRESSURE: 69 MMHG | SYSTOLIC BLOOD PRESSURE: 111 MMHG | RESPIRATION RATE: 16 BRPM | OXYGEN SATURATION: 96 % | BODY MASS INDEX: 26.04 KG/M2

## 2021-10-27 DIAGNOSIS — O26.872 SHORT CERVIX DURING PREGNANCY IN SECOND TRIMESTER: ICD-10-CM

## 2021-10-27 DIAGNOSIS — O09.32 LATE PRENATAL CARE AFFECTING PREGNANCY IN SECOND TRIMESTER: ICD-10-CM

## 2021-10-27 DIAGNOSIS — O09.612 HIGH-RISK FIRST PREGNANCY OF YOUNG WOMAN, SECOND TRIMESTER: Primary | ICD-10-CM

## 2021-10-27 DIAGNOSIS — O09.90 SUPERVISION OF HIGH RISK PREGNANCY, ANTEPARTUM: ICD-10-CM

## 2021-10-27 DIAGNOSIS — Z91.89 AT RISK FOR DIABETES MELLITUS: ICD-10-CM

## 2021-10-27 DIAGNOSIS — O09.92 HIGH-RISK PREGNANCY IN SECOND TRIMESTER: ICD-10-CM

## 2021-10-27 DIAGNOSIS — O09.612 HIGH-RISK FIRST PREGNANCY OF YOUNG WOMAN, SECOND TRIMESTER: ICD-10-CM

## 2021-10-27 DIAGNOSIS — Z23 NEED FOR PROPHYLACTIC VACCINATION AND INOCULATION AGAINST INFLUENZA: ICD-10-CM

## 2021-10-27 DIAGNOSIS — O09.529 SUPERVISION OF HIGH-RISK PREGNANCY OF ELDERLY MULTIGRAVIDA: Primary | ICD-10-CM

## 2021-10-27 DIAGNOSIS — O34.32 CERVICAL CERCLAGE SUTURE PRESENT IN SECOND TRIMESTER: ICD-10-CM

## 2021-10-27 LAB
BKR LAB AP GYN ADEQUACY: NORMAL
BKR LAB AP GYN INTERPRETATION: NORMAL
BKR LAB AP HPV REFLEX: NORMAL
BKR LAB AP PREVIOUS ABNORMAL: NORMAL
HBA1C MFR BLD: 4.8 % (ref 0–5.6)
PATH REPORT.COMMENTS IMP SPEC: NORMAL
PATH REPORT.RELEVANT HX SPEC: NORMAL

## 2021-10-27 PROCEDURE — 99207 PR NO CHARGE NURSE ONLY: CPT

## 2021-10-27 PROCEDURE — 87491 CHLMYD TRACH DNA AMP PROBE: CPT

## 2021-10-27 PROCEDURE — 99213 OFFICE O/P EST LOW 20 MIN: CPT | Mod: 25

## 2021-10-27 PROCEDURE — 90471 IMMUNIZATION ADMIN: CPT

## 2021-10-27 PROCEDURE — 90686 IIV4 VACC NO PRSV 0.5 ML IM: CPT

## 2021-10-27 NOTE — PROGRESS NOTES
Past Medical History     Do you have a history of any of the following medical conditions?    Condition No/Yes/Details   Hypertension No    Heart disease, mitral valve prolapse, rheumatic fever No    Asthma or another chronic lung disease No    An autoimmune disorder No    Kidney disease No    Frequent urinary tract infections No    Epilepsy, seizures, or spells No    Migraine headaches No    Stroke, loss of sensation/function, seizures, or other neuro problem No    Diabetes No    Thyroid problems or have you taken thyroid medication No    Hepatitis, liver disease, jaundice No    Blood clots, phlebitis, pulmonary embolism or varicose veins No    Excessive bleeding after surgery or dental work No    Do you have more bleeding than other women after a cut or a scratch? No    Anemia No    Blood transfusions No         Would you refuse a blood transfusion?       No   If yes, then ask next question. If no, skip next question.   Would you rather die than receive a blood transfusion? No    Breast problems No    Have you ever ? No plans to breastfeed   Abnormalities of the uterus  YES cervical incompetence   Abnormal pap smear No    Have you ever been treated for depression? No    Are you having problems with crying spells or loss of self-esteem? No    Have you ever required psychiatric care? No    Have you been physically, sexually, or emotionally hurt by someone? No    Have you been in a major accident or suffered serious trauma? No    Have you ever had any gynecological surgical procedures such as cervical conization, LEEP, laser treatment, cryosurgery of the cervix, or a dilatation and curettage?  YES had cerclage placed on 10/25/21   Have you had any other surgical procedures? No         Have you ever had any complications from anesthesia? No    Have you ever been hospitalized for a nonsurgical reason? No             Substance use and exposure     Does anyone in your home smoke? No    Do you use tobacco  products or betel nut? No    Do you drink beer, wine, or hard liquor? No    Do you use any of the following: marijuana, speed, cocaine, heroin, hallucinogens, or other drugs? No               Symptoms since Last Menstrual Period     Do you currently have any of the following symptoms: No/Yes/Details        Abdominal pain  YES mild cramping after cerclage        Blood in the stools or urine No         Chest pain No         Shortness of breath No         coughing or vomiting up blood No         Your heart racing or skipping beats No         Nausea or vomiting No         Pain on urination No         Vaginal discharge or bleeding  YES vaginal spotting red since cerclage placed 10/25               Genetic Screening          Is the patient 35 years or older? No      Do you have a history of any of the following No/Yes/Details        A metabolic disorder (e.g. Insulin-dependent DM, PKU) No         Recurrent pregnancy loss or still birth No      Do you, the baby's father, or anyone in your families have          Thalassemia AND MCV <80 No         Hemophilia No         Neural tube defect No }        Congenital heart defect No         Sickle cell disease or trait No         Muscular dystrophy No         Cystic fibrosis No         Mental retardation or autism No         Down's syndrome No         Darius-Sach's disease No         [place]'s chorea No         Any other inherited genetic or chromosomal disorder No         A child with birth defects not listed above No                Infection History     Ever treated for tuberculosis or had a positive skin test No    Ever had genital herpes (or has your partner) No    Had a rash or viral illness since LMP No    Ever had a sexually transmitted infection No    Ever had chicken pox or the vaccine No    Have you had a sexual partner who is HIV positive No    Ever had any other serious infectious disease No                Risk Assessment     Average Risk Category  No significant  risk factors: Yes    At Risk Category (up to 3)  Teen pregnancy: No  Poor social situation: No  Domestic abuse: No  Financial difficulties: No  Smoker: No  H/O  deliver: No  H/O drug abuse: No  Non-English speaking: No  Advanced maternal age: No  GDM risks: No  Previous C/S: No  H/O PIH: No  H/O STIs: No  H/O mental health concerns: No  Onset care > 20 weeks: No    High Risk Category (4 or more At Risk or)  Diabetes/GDM: No  Multiple gestation: No  Chronic hypertension: No  Significant hx of asthma: No  Fetal demise > 20 weeks: No  Positive tox screen: No  Current mental health treatment: No  Other: cervical incompetence and cerclage    Risk: High Risk   Date determined: 10/27/21

## 2021-10-27 NOTE — PROGRESS NOTES
First Obstetric Visit           Assessment and Plan     Eh was seen today for prenatal care and imm/inj.  Diagnoses and all orders for this visit:  High-risk first pregnancy of young woman, second trimester  -     Hemoglobin A1c; Future  -     Chlamydia trachomatis PCR; Future  -     US OB >14 Weeks Follow Up; Future  -     Chlamydia trachomatis PCR  At risk for diabetes mellitus  -     Hemoglobin A1c; Future  Need for prophylactic vaccination and inoculation against influenza  -     INFLUENZA VACCINE IM > 6 MONTHS VALENT IIV4 (AFLURIA/FLUZONE)  High-risk pregnancy in second trimester  21 year old  , 22w1d weeks of pregnancy with DESTIN of Mar 1, 2022 by LMP of Patient's last menstrual period was 2021 (approximate)..    Pregnancy Risk Assessment: High Risk pregnancy  Discussed high risk conditions as follows:cervical insufficiency  -Dating US obtained and dating confirmed?   Yes 10/25/2021  -Taking PNV/Folate?     Yes   - Ordered new OB labs: blood type and antibody screen, HIV, VDRL, hep B, rubella, UA/UC, gonorrhea/chlamydia, Pap smear, Hepatitis B immunity, Varicella immunity and Wet prep done 10/25. Added varicella today. Will follow up with results.   -Discussed risks and benefits of first trimester screen for trisomies, patient declined or was too late in gestational age for this test.   - Discussed genetic screening. Patient does want to pursue screening.   - Discussed screening for sickle cell anemia. Patient does not  want to pursue Hb electrophoresis.   - Discussed early screening for gestational diabetes. The patient does not have a history of GDM, BMI>30, h/o prediabetes/glucose intolerance, first degree relative with GDM or DM, or chronic HTN, so WILL obtain early GCT or A1c.  - The patient does not h/o severe, early preeclampsia with delivery <34weeks, preeclampsia in more than one pregnancy, pre-gestational diabetes, chronic hypertension, renal disease, or autoimmune disease so WILL NOT start  low dose aspirin (81mg) and calcium supplementation (1g-1.5g/day elemental = 2500mg- 3750mg/day Calcium carbonate) to prevent preeclampsia.  - The patient  does have risk factor of spontaneous  birth so  WILL consider starting at 16-20 weeks  serial transvaginal cervical length ultrasounds from 16-24 weeks.  - Prenatal vitamins ordered.  - Flu shot offered and was Accepted.  Counseling given:   - Follow up in 4 weeks for return OB visit.  - Recommended weight gain for pregnancy: 11-20 lbs (pregravid BMI >30)  - Instructed on best evidence for: healthy diet and foods to avoid; exercise and activity during pregnancy; avoiding exposure to toxoplasmosis; safe use of seatbelts during pregnancy; and maintenance of a generally healthy lifestyle  -Patient to see OB educator/ RN today and/or next visit.    Discussed the harms, benefits, side effects and alternative therapies for current prescribed and OTC medications.      Patient Instructions   Patient Education     Adapting to Pregnancy: First Trimester  As your body adjusts during your first trimester of pregnancy, you may have to change or limit your daily activities. You ll need more rest. You may also need to use the energy you have more wisely.   Your changing body  Almost every part of your body is affected as you adapt to pregnancy. The uterus and cervix will start to soften right away. You may not look very pregnant during the first 3 months. But you are likely to have some common signs of early pregnancy:     Nausea    Fatigue    Frequent urination    Mood swings    Bloating of the belly    Constipation    Heartburn    Missed or light periods (first trimester bleeding)    Nipple or breast tenderness and breast swelling  It s not too late to start good habits   What matters most is protecting your baby from this moment on. If you smoke, drink alcohol, or use drugs, now is the time to stop. If you need help, talk with your healthcare provider:     Smoking  increases the risk of stillbirth or having a low-birth-weight baby. If you smoke, quit now.    Alcohol and drugs have been linked with miscarriage, birth defects, intellectual disability, and low birth weight. Don't drink alcohol or take drugs.  Tips to relieve nausea  During pregnancy, nausea can happen at any time of the day, but it may be worse in the morning. To help prevent nausea:     Eat small, light meals at frequent intervals.    Drink fluids often.    Get up slowly. Eat a few unsalted crackers before you get out of bed.    Avoid smells that bother you.    Avoid spicy and fatty foods.    Eat an ice pop in your favorite flavor.    Get plenty of rest.    Ask your healthcare provider about taking leigh ann or vitamin B6 for nausea and vomiting.    Talk with your healthcare provider if you take vitamins that upset your stomach.   Work concerns  The end of the first trimester is a good time to discuss working during pregnancy with your employer. Follow your healthcare provider s advice if your job needs you to stand for a long time, work with hazardous tools, or even sit at a desk all day. Your workspace, workload, or scheduled hours may need to be adjusted. Perhaps you can change body postures more often or take an extra break.   Advice for travel  Talk to your healthcare provider first, but the second trimester may be the best time for any travel. You may be advised to avoid certain trips while you re pregnant. Food and water can be concerns in developing countries. Travel by car is a good choice, as you can stop, get out, and stretch. Bring snacks and water along. Fasten the lap belt below your belly, low over your hips. Also be sure to wear the shoulder harness.   Intimacy  Unless your healthcare provider tells you to, there's no reason to stop having sex while you re pregnant. You or your partner may notice changes in desire. Desire may be less in the first trimester, due to nausea and fatigue. In the second  trimester, sex may be very enjoyable. The third trimester can be a challenge comfort-wise. Try different positions and see what s best for you both.   [employer] last reviewed this educational content on 2020-2021 [employer]. All rights reserved. This information is not intended as a substitute for professional medical care. Always follow your healthcare professional's instructions.               Options for treatment and follow-up care were reviewed with the patient and/or guardian. Tigre Ceballos and/or guardian engaged in the decision making process and verbalized understanding of the options discussed and agreed with the final plan.      Nicholas Marte MD         HPI       Tigre Ceballos is a 21 year old woman who presents for an initial prenatal visit at 22w1d weeks of pregnancy with DESTIN of Mar 1, 2022 by LMP of Patient's last menstrual period was 2021 (approximate)..      She has had bleeding since her LMP.  The bleeding  was brown, in small, on when urinating.wiping occasions, and was accompanied by cramping... feeling better now   She has not had nausea.   Weight loss has not occurred.    This was not a planned pregnancy.     OTHER CONCERNS: no               Labor Risk Assessment     Is the patient's age <18 or >40?     No  Patint's BMI is Body mass index is 26.04 kg/m .   Does patient have a BMI < 18.5?     No  Prior delivery within 6 months?      No  Ever delivered prior to 37 weeks gestation?  No  Pregnancy occur via In Vitro Fertilization?   No  Are you carrying twins?       No    The patient has the following additional risk factors for  labor:   Q8: History of uterine anomaly or cervical injury or surgery , cervical insufficiency s/p Modified Elo cervical cerclage placement 10/25    as documented     Labor Risk Summary:  Pt is high risk for  Labor  Yes                   Past Medical History     Past Medical History:   Diagnosis Date     Cervical  incompetence      See nurse history note, reviewed in detail.             Current Medications      Current Outpatient Medications   Medication Sig Dispense Refill     acetaminophen (TYLENOL) 325 MG tablet Take 2 tablets (650 mg) by mouth every 6 hours as needed for mild pain 60 tablet 0     Prenatal Vit-Fe Fumarate-FA (PRENATAL MULTIVITAMIN W/IRON) 27-0.8 MG tablet Take 1 tablet by mouth daily 90 tablet 3     diphenhydrAMINE (BENADRYL) 25 MG capsule Take 2 capsules (50 mg) by mouth every 6 hours as needed for itching or allergies (Patient not taking: Reported on 10/20/2021) 30 capsule 0     EPINEPHrine (ANY BX GENERIC EQUIV) 0.3 MG/0.3ML injection 2-pack Inject 0.3 mLs (0.3 mg) into the muscle once as needed for anaphylaxis (Patient not taking: Reported on 10/20/2021) 0.6 mL 0     indomethacin (INDOCIN) 25 MG capsule Take 1 capsule (25 mg) by mouth every 6 hours (Patient not taking: Reported on 10/27/2021) 2 capsule 0             Review of Systems      ROS:  No - Headache  No - Changes in vision  No - Chest Pain  No - Shortness of Breath  No - Nausea   No - Vomiting  YES - Abdominal pain   No - Contractions  No - Dysuria   No - Vaginal Discharge    YES - Vaginal bleeding   No - Loss of Fluid   No - Extremity swelling     ====================================================           Physical Exam      /69 (BP Location: Left arm, Patient Position: Sitting, Cuff Size: Adult Regular)   Pulse 97   Temp 98.3  F (36.8  C) (Oral)   Resp 16   Wt 66.7 kg (147 lb)   LMP 06/04/2021 (Approximate)   SpO2 96%   BMI 26.04 kg/m    GENERAL: healthy, alert and no distress  NECK: no tenderness, no adenopathy, no asymmetry, no masses, no stiffness; thyroid- normal to palpation  RESP: lungs clear to auscultation - no rales, no rhonchi, no wheezes  CV: regular rates and rhythm, normal S1 S2, no S3 or S4 and no murmur, no click or rub -  ABDOMEN: soft, no tenderness, no  hepatosplenomegaly, no masses, normal bowel sounds  MS:  extremities- no gross deformities noted, no edema  Cervical/pelvic exam deferred  Past labs reviewed:  B POS  Varicella immune pending results  Hepatitis B immune Yes  Last pap pending.  She was due for this. Completed 10/25.     =========================================

## 2021-10-27 NOTE — PATIENT INSTRUCTIONS
Patient Education     Adapting to Pregnancy: First Trimester  As your body adjusts during your first trimester of pregnancy, you may have to change or limit your daily activities. You ll need more rest. You may also need to use the energy you have more wisely.   Your changing body  Almost every part of your body is affected as you adapt to pregnancy. The uterus and cervix will start to soften right away. You may not look very pregnant during the first 3 months. But you are likely to have some common signs of early pregnancy:     Nausea    Fatigue    Frequent urination    Mood swings    Bloating of the belly    Constipation    Heartburn    Missed or light periods (first trimester bleeding)    Nipple or breast tenderness and breast swelling  It s not too late to start good habits   What matters most is protecting your baby from this moment on. If you smoke, drink alcohol, or use drugs, now is the time to stop. If you need help, talk with your healthcare provider:     Smoking increases the risk of stillbirth or having a low-birth-weight baby. If you smoke, quit now.    Alcohol and drugs have been linked with miscarriage, birth defects, intellectual disability, and low birth weight. Don't drink alcohol or take drugs.  Tips to relieve nausea  During pregnancy, nausea can happen at any time of the day, but it may be worse in the morning. To help prevent nausea:     Eat small, light meals at frequent intervals.    Drink fluids often.    Get up slowly. Eat a few unsalted crackers before you get out of bed.    Avoid smells that bother you.    Avoid spicy and fatty foods.    Eat an ice pop in your favorite flavor.    Get plenty of rest.    Ask your healthcare provider about taking leigh ann or vitamin B6 for nausea and vomiting.    Talk with your healthcare provider if you take vitamins that upset your stomach.   Work concerns  The end of the first trimester is a good time to discuss working during pregnancy with your employer.  Follow your healthcare provider s advice if your job needs you to stand for a long time, work with hazardous tools, or even sit at a desk all day. Your workspace, workload, or scheduled hours may need to be adjusted. Perhaps you can change body postures more often or take an extra break.   Advice for travel  Talk to your healthcare provider first, but the second trimester may be the best time for any travel. You may be advised to avoid certain trips while you re pregnant. Food and water can be concerns in developing countries. Travel by car is a good choice, as you can stop, get out, and stretch. Bring snacks and water along. Fasten the lap belt below your belly, low over your hips. Also be sure to wear the shoulder harness.   Intimacy  Unless your healthcare provider tells you to, there's no reason to stop having sex while you re pregnant. You or your partner may notice changes in desire. Desire may be less in the first trimester, due to nausea and fatigue. In the second trimester, sex may be very enjoyable. The third trimester can be a challenge comfort-wise. Try different positions and see what s best for you both.   [employer] last reviewed this educational content on 4/1/2020 2000-2021 [employer]. All rights reserved. This information is not intended as a substitute for professional medical care. Always follow your healthcare professional's instructions.

## 2021-10-28 LAB
C TRACH DNA SPEC QL NAA+PROBE: NEGATIVE
LEAD BLDV-MCNC: <2 UG/DL
VZV IGG SER QL IA: 406.7 INDEX
VZV IGG SER QL IA: POSITIVE

## 2021-11-01 PROBLEM — Z91.89 AT RISK FOR DIABETES MELLITUS: Status: ACTIVE | Noted: 2021-10-27

## 2021-11-01 PROBLEM — O09.90 SUPERVISION OF HIGH RISK PREGNANCY, ANTEPARTUM: Status: ACTIVE | Noted: 2021-10-27

## 2021-11-01 PROBLEM — O09.619: Status: ACTIVE | Noted: 2021-10-27

## 2021-11-01 PROBLEM — O09.32 LATE PRENATAL CARE AFFECTING PREGNANCY IN SECOND TRIMESTER: Status: ACTIVE | Noted: 2021-10-27

## 2021-11-01 NOTE — PROGRESS NOTES
Family Medicine OB Education    I provided the following OB education to Tigre ORTEGA Lin.    Discussed that Dr Marte should be the doctor that she sees for her prenatal visits and that Dr Marte will try hard to be the one to deliver her baby.  Discussed that if Dr Marte was unavailable that one of our other physicians would deliver the baby and that this could be a male or female provider.  Briefly discussed residency program and that multiple doctors would be present at time of delivery.  Sheet given and discussed fetal growth and development.  Sheet given and discussed warning signs with reasons to call clinic or L&D with questions or concerns (phone numbers given).  Sheet given and discussed Tdap to be given after 27 weeks gestation and the importance of family members get immunized before delivery.  Proof of pregnancy completed and given to pt.  Gave pt preregistration form for hospital to complete.  See questionnaire and pregnancy risk assessment for further information in provider encounter.       Name of provider who requested the OB education: Dr Marte  Name of provider on site (faculty or community preceptor) at the time of performing the OB education: Dr Joni Clark, RN, BSN

## 2021-11-12 ENCOUNTER — TELEPHONE (OUTPATIENT)
Dept: FAMILY MEDICINE | Facility: CLINIC | Age: 21
End: 2021-11-12
Payer: COMMERCIAL

## 2021-11-12 DIAGNOSIS — O26.872 SHORT CERVIX DURING PREGNANCY IN SECOND TRIMESTER: ICD-10-CM

## 2021-11-12 DIAGNOSIS — O09.32 LATE PRENATAL CARE AFFECTING PREGNANCY IN SECOND TRIMESTER: ICD-10-CM

## 2021-11-12 DIAGNOSIS — O34.32 CERVICAL CERCLAGE SUTURE PRESENT IN SECOND TRIMESTER: ICD-10-CM

## 2021-11-12 NOTE — TELEPHONE ENCOUNTER
LMTCC for pt to assist her with scheduling OB growth u/s on Monday, 12/6/21 at [facility]./NG

## 2021-11-16 NOTE — TELEPHONE ENCOUNTER
Called Eh Matthews Lin and she states that she is doing good and denies questions or concerns.  Scheduled her for OB growth u/s on 12/13/21 at 8:40 AM (sched for 12/6 was full).  Reminded pt to come for MARIANNA next week and to call if has any questions or concerns./NG

## 2021-11-23 ENCOUNTER — OFFICE VISIT (OUTPATIENT)
Dept: FAMILY MEDICINE | Facility: CLINIC | Age: 21
End: 2021-11-23
Payer: COMMERCIAL

## 2021-11-23 VITALS
TEMPERATURE: 98.1 F | SYSTOLIC BLOOD PRESSURE: 103 MMHG | HEART RATE: 84 BPM | DIASTOLIC BLOOD PRESSURE: 69 MMHG | OXYGEN SATURATION: 99 % | BODY MASS INDEX: 27.14 KG/M2 | RESPIRATION RATE: 20 BRPM | WEIGHT: 153.2 LBS

## 2021-11-23 DIAGNOSIS — O09.90 SUPERVISION OF HIGH RISK PREGNANCY, ANTEPARTUM: Primary | ICD-10-CM

## 2021-11-23 LAB
GLUCOSE 1H P 50 G GLC PO SERPL-MCNC: 98 MG/DL (ref 70–129)
HGB BLD-MCNC: 11.1 G/DL (ref 11.7–15.7)
T PALLIDUM AB SER QL: NONREACTIVE

## 2021-11-23 PROCEDURE — 36415 COLL VENOUS BLD VENIPUNCTURE: CPT

## 2021-11-23 PROCEDURE — 82950 GLUCOSE TEST: CPT

## 2021-11-23 PROCEDURE — 99212 OFFICE O/P EST SF 10 MIN: CPT | Mod: GC

## 2021-11-23 PROCEDURE — 86780 TREPONEMA PALLIDUM: CPT

## 2021-11-23 PROCEDURE — 85018 HEMOGLOBIN: CPT

## 2021-11-23 NOTE — PATIENT INSTRUCTIONS
Patient Education     Adapting to Pregnancy: Second Trimester    Keep up the healthy habits you started in your first trimester. You might be a little more tired than normal. So plan your day wisely. Look at the tips below and choose the ones that suit your lifestyle.  If you have any questions, check with your healthcare provider.  If you work  If you can, adjust your work with your employer to fit your needs. Try these tips:    If you stand for long periods, find ways to do some tasks while sitting. Also, try to stand with 1 foot resting on a low stool or ledge. Shift your weight from foot to foot often. Wear low-heeled shoes.    If you sit, keep your knees level with your hips. Rest your feet on a firm surface. Sit tall with support for your low back.    If you work long hours, ask about adjusting your schedule. Try taking shorter breaks more often.  When you travel  The second trimester may be the best time for any travel. Talk to your healthcare provider about any special plans you may need to make. Always:    Wear a seat belt. Fasten the lap part under your belly. Wear the shoulder part also.    Take breaks often during long trips by car or plane. Move around to stretch your legs.    Drink plenty of fluids on flights. The air in plane cabins is very dry.    Avoid hot climates or high altitudes if you are not used to them.    Avoid places where the food and water might make you sick.    Make sure you are up-to-date on all immunizations, including the flu vaccine. This is especially important when traveling overseas.  Taking time to relax  Find time to rest and relax at work or at home:    Take short time-outs daily. Do relaxation exercises.    Breathe deeply during stressful times.    Try not to take on too much. Plan tasks for times when you have the most energy.    Take naps when you can. Or just sit and relax.    After week 16, avoid lying on your back for more than a few minutes. Instead, lie on your side.  Switch sides often.  Continuing as lovers  Unless your healthcare provider tells you otherwise, there is no reason to stop having sex now. Blood supply increases to the pelvic area in the second trimester. Because of this, sex might be more enjoyable. Try different positions and see what s best. Also, talk to your partner about any changes in desire. Spotting may happen after sex. Be sure to let your healthcare provider know if there is heavy bleeding.  Keeping your environment safe  You can still clean house and use scented products. Just take some simple precautions:    Wear gloves when using cleaning fluids.    Open windows to let in fresh air. Use a fan if you paint.    Avoid secondhand smoke.    Don t breathe fumes from nail polish, hair spray, cleansers, or other chemicals.  [employer] last reviewed this educational content on 1/1/2018 2000-2021 [employer]. All rights reserved. This information is not intended as a substitute for professional medical care. Always follow your healthcare professional's instructions.

## 2021-11-23 NOTE — PROGRESS NOTES
Assessment & Plan  21 year old  at 26w0d with DESTIN Mar 1, 2022 based on 20 week US    Eh was seen today for prenatal care.    Diagnoses and all orders for this visit:    Supervision of high risk pregnancy, antepartum  -     Glucose tolerance gest screen 1 hour; Future  -     Hemoglobin; Future  -     Treponema Abs w Reflex to RPR and Titer; Future  -     Treponema Abs w Reflex to RPR and Titer  -     Hemoglobin  -     Glucose tolerance gest screen 1 hour        Weight gain adequate: 8.255 kg (18 lb 3.2 oz) to date, out of recommended total of 11-20 lbs (pregravid BMI >30)    Patient Instructions   Patient Education     Adapting to Pregnancy: Second Trimester    Keep up the healthy habits you started in your first trimester. You might be a little more tired than normal. So plan your day wisely. Look at the tips below and choose the ones that suit your lifestyle.  If you have any questions, check with your healthcare provider.  If you work  If you can, adjust your work with your employer to fit your needs. Try these tips:    If you stand for long periods, find ways to do some tasks while sitting. Also, try to stand with 1 foot resting on a low stool or ledge. Shift your weight from foot to foot often. Wear low-heeled shoes.    If you sit, keep your knees level with your hips. Rest your feet on a firm surface. Sit tall with support for your low back.    If you work long hours, ask about adjusting your schedule. Try taking shorter breaks more often.  When you travel  The second trimester may be the best time for any travel. Talk to your healthcare provider about any special plans you may need to make. Always:    Wear a seat belt. Fasten the lap part under your belly. Wear the shoulder part also.    Take breaks often during long trips by car or plane. Move around to stretch your legs.    Drink plenty of fluids on flights. The air in plane cabins is very dry.    Avoid hot climates or high altitudes if you are not  used to them.    Avoid places where the food and water might make you sick.    Make sure you are up-to-date on all immunizations, including the flu vaccine. This is especially important when traveling overseas.  Taking time to relax  Find time to rest and relax at work or at home:    Take short time-outs daily. Do relaxation exercises.    Breathe deeply during stressful times.    Try not to take on too much. Plan tasks for times when you have the most energy.    Take naps when you can. Or just sit and relax.    After week 16, avoid lying on your back for more than a few minutes. Instead, lie on your side. Switch sides often.  Continuing as lovers  Unless your healthcare provider tells you otherwise, there is no reason to stop having sex now. Blood supply increases to the pelvic area in the second trimester. Because of this, sex might be more enjoyable. Try different positions and see what s best. Also, talk to your partner about any changes in desire. Spotting may happen after sex. Be sure to let your healthcare provider know if there is heavy bleeding.  Keeping your environment safe  You can still clean house and use scented products. Just take some simple precautions:    Wear gloves when using cleaning fluids.    Open windows to let in fresh air. Use a fan if you paint.    Avoid secondhand smoke.    Don t breathe fumes from nail polish, hair spray, cleansers, or other chemicals.  [email or web address] last reviewed this educational content on 1/1/2018 2000-2021 [employer]. All rights reserved. This information is not intended as a substitute for professional medical care. Always follow your healthcare professional's instructions.               Return to clinic in 4 weeks.    Nicholas Marte MD  I precepted today with Dion Arellano MD.        Subjective  Concerns: diarrhea and vomittinf Friday night x 2 weeks beliebved to be related to food. Ate a lot that day, spciy.     ROS:  No - Headache  No - Changes  in vision  No - Chest Pain  No - Shortness of Breath  No - Nausea   No - Vomiting  No - Abdominal pain   No - Contractions  No - Dysuria   No - Vaginal Discharge    No - Vaginal bleeding   No - Loss of Fluid   No - Extremity swelling   Present - Fetal movement     Going to WIC? Yes    Risk Assessment   Average Risk Category, shorted cervix,   No significant risk factors: Yes    At Risk Category (up to 3)  Teen pregnancy: No  Poor social situation: No  Domestic abuse: No  Financial difficulties: No  Smoker: No  H/O  deliver: No  H/O drug abuse: No  Non-English speaking: No  Advanced maternal age: No  GDM risks: No  Previous C/S: No  H/O PIH: No  H/O STIs: No  H/O mental health concerns: No  Onset care > 20 weeks: Yes  Other: none    High Risk Category (4 or more At Risk or)  Diabetes/GDM: No  Multiple gestation: No  Chronic hypertension: No  Significant hx of asthma: No  Fetal demise > 20 weeks: No  Positive tox screen: No  Current mental health treatment: No  Other: none    Risk: At Risk   Date determined: 2021    Patient Active Problem List   Diagnosis     Short cervix during pregnancy in second trimester     Supervision of high risk pregnancy, antepartum     Late prenatal care affecting pregnancy in second trimester     Cervical cerclage suture present in second trimester     At risk for diabetes mellitus     High-risk first pregnancy of young woman, unspecified trimester       Eh G Lin speaks English so an  was not used today.    Guidance:  circumcision  breastfeeding  GDM  signs of  labor    Objective  /69   Pulse 84   Temp 98.1  F (36.7  C) (Oral)   Resp 20   Wt 69.5 kg (153 lb 3.2 oz)   LMP 2021 (Approximate)   SpO2 99%   BMI 27.14 kg/m    No distress.  Gravid abdomen.  FHT 140s.  Fundal height 26 cm.  no edema.    Results  Blood type: B POS  Results for orders placed or performed in visit on 21   Hemoglobin     Status: Abnormal   Result Value Ref Range     Hemoglobin 11.1 (L) 11.7 - 15.7 g/dL   Glucose tolerance gest screen 1 hour     Status: Normal   Result Value Ref Range    Glu Gest Screen 1hr 50g 98 70 - 129 mg/dL    Narrative    This is a screening test for Gestational Diabetes Mellitus.   If results are 130 mg/dL or greater, a Standard 100 gram Gestational  3 hour Glucose Tolerance should be performed.

## 2021-11-23 NOTE — PROGRESS NOTES
Preceptor Attestation:    I discussed the patient with the resident and evaluated the patient in person. I have verified the content of the note, which accurately reflects my assessment of the patient and the plan of care.   Supervising Physician:  Dion Arellano MD.

## 2021-12-13 ENCOUNTER — ANCILLARY PROCEDURE (OUTPATIENT)
Dept: ULTRASOUND IMAGING | Facility: CLINIC | Age: 21
End: 2021-12-13
Attending: FAMILY MEDICINE
Payer: COMMERCIAL

## 2021-12-13 DIAGNOSIS — O09.612 HIGH-RISK FIRST PREGNANCY OF YOUNG WOMAN, SECOND TRIMESTER: ICD-10-CM

## 2021-12-13 PROCEDURE — 76816 OB US FOLLOW-UP PER FETUS: CPT | Performed by: RADIOLOGY

## 2021-12-21 ENCOUNTER — OFFICE VISIT (OUTPATIENT)
Dept: FAMILY MEDICINE | Facility: CLINIC | Age: 21
End: 2021-12-21
Payer: COMMERCIAL

## 2021-12-21 VITALS
BODY MASS INDEX: 28.17 KG/M2 | TEMPERATURE: 98 F | RESPIRATION RATE: 16 BRPM | WEIGHT: 159 LBS | DIASTOLIC BLOOD PRESSURE: 79 MMHG | OXYGEN SATURATION: 100 % | SYSTOLIC BLOOD PRESSURE: 115 MMHG | HEART RATE: 98 BPM

## 2021-12-21 DIAGNOSIS — Z23 HIGH PRIORITY FOR 2019-NCOV VACCINE: Primary | ICD-10-CM

## 2021-12-21 PROCEDURE — 99212 OFFICE O/P EST SF 10 MIN: CPT | Mod: 25

## 2021-12-21 PROCEDURE — 0064A COVID-19,PF,MODERNA (18+ YRS BOOSTER .25ML): CPT

## 2021-12-21 PROCEDURE — 91306 COVID-19,PF,MODERNA (18+ YRS BOOSTER .25ML): CPT

## 2021-12-21 NOTE — PROGRESS NOTES
Assessment & Plan  21 year old  at 30w0d with DESTIN Mar 1, 2022 based on 21w 6d week US.     Eh was seen today for prenatal care and imm/inj.    Diagnoses and all orders for this visit:    High priority for 2019-nCoV vaccine  -     COVID-19,PF,MODERNA (18+ Yrs BOOSTER .25mL)      Weight gain adequate: 10.9 kg (24 lb) to date, out of recommended total of 11-20 lbs (pregravid BMI >30)    Patient Instructions     Thank you for trusting us with your care.     Here's a summary of the visit today:   1. Please follow up with thomas about anne a follow up US  2. Follow up with OB provider in 2 weeks   3.   Thank you.     Dr. Marte    Patient Education     Adapting to Pregnancy: Third Trimester    Although common during pregnancy, some discomforts may seem worse in the final weeks. Simple lifestyle changes can help. Take care of yourself. And ask your partner to help out with small tasks.  Limiting leg problems  Ways to combat leg issues:    Wear support hose all day.    Avoid snug shoes and clothes that bind, like tight pants and socks with elastic tops.    Sit with your feet and legs raised often.  Caring for your breasts  Tips to follow include:    Wash with plain water. Avoid using harsh soaps or rubbing alcohol. They may cause dryness.    Wear a nursing bra for extra support. It can also hide any leaks from your nipples.  Controlling hemorrhoids  Ways to avoid hemorrhoids include:    Eat foods that are high in fiber. Also, exercise and drink enough fluids. This will reduce constipation and hemorrhoids.    Sleep and nap on your side. This limits pressure on the veins of your rectum.    Try not to stand or sit for long periods.  Controlling back pain  As your body changes during pregnancy, your back must work in new ways. Back pain is due to many causes. Physical changes in your body can strain your back and its supporting muscles. Also, hormones (chemicals that carry messages throughout the body) increase  during pregnancy. This can affect how your muscles and joints work together. All of these changes can lead to pain. Pain may be felt in the upper or lower back. Pain is also common in the pelvis. Some pregnant women have sciatica. This is pain caused by pressure on the sciatic nerve running down the back of the leg. Ask your healthcare provider for specific tips and exercises to help control your back pain.  Tips to help you rest  Good rest and sleep will help you feel better. Here are some ideas:    Ask your partner to massage your shoulders, neck, or back.    Limit the errands you do each day.    Lie down in the afternoon or after work for a few minutes.    Take a warm bath before you go to sleep.    Drink warm milk or teas without caffeine.    Avoid coffee, black tea, and cola.  Stopping heartburn    Avoid spicy, greasy, fried, or acidic foods.    Eat small amounts more often. Eat slowly.   Wait 2 hours after eating before lying down.    Sleep with your upper body raised 6 inches.   Managing mood swings  Ways to manage mood swings include:    Know that mood changes are normal.    Exercise often, but get plenty of rest.    Address any concerns and limit stress. Talking to your partner, other women, or your healthcare provider may help.  Dealing with urinary frequency  Tips to deal with having to urinate often include:    Drink plenty of water all day. If you drink a lot in the evening, though, you may have to get up more in the night.    Limit coffee, black tea, and cola.  [employer] last reviewed this educational content on 2/1/2018 2000-2021 [employer]. All rights reserved. This information is not intended as a substitute for professional medical care. Always follow your healthcare professional's instructions.               Return to clinic in 2 weeks.    Nicholas Marte MD  I precepted today with Gilson London MD.      Subjective  Concerns: none   No nausea/vomiting       ROS:  No - Headache  No -  Changes in vision  No - Chest Pain  No - Shortness of Breath  No - Nausea   No - Vomiting  No - Abdominal pain   No - Contractions  No - Dysuria   No - Vaginal Discharge    No - Vaginal bleeding   No - Loss of Fluid   No - Extremity swelling   Present - Fetal movement       Going to WIC? Yes      Patient Active Problem List   Diagnosis     Short cervix during pregnancy in second trimester     Supervision of high risk pregnancy, antepartum     Late prenatal care affecting pregnancy in second trimester     Cervical cerclage suture present in second trimester     At risk for diabetes mellitus     High-risk first pregnancy of young woman, unspecified trimester       Eh G Lin speaks English so an  was not used today.    Guidance:  seatbelt use  fetal growth and movement  RhoGAM  signs of  labor    Do you need help getting a car seat? No  Do you need help getting a breast pump? No      Objective  /79 (BP Location: Left arm, Patient Position: Sitting, Cuff Size: Adult Regular)   Pulse 98   Temp 98  F (36.7  C) (Oral)   Resp 16   Wt 72.1 kg (159 lb)   LMP 2021 (Approximate)   SpO2 100%   BMI 28.17 kg/m    No distress.  Gravid abdomen.  .  Fundal height 28cm.  no edema.    Results    Blood type: B POS  No results found for this visit on 21.    No results found for any visits on 21.

## 2021-12-21 NOTE — PROGRESS NOTES
Preceptor Attestation:    I discussed the patient with the resident and evaluated the patient in person. I have verified the content of the note, which accurately reflects my assessment of the patient and the plan of care.   Supervising Physician:  Gilson London MD.

## 2021-12-21 NOTE — PATIENT INSTRUCTIONS
Thank you for trusting us with your care.     Here's a summary of the visit today:   1. Please follow up with thomas rodríguez a follow up US  2. Follow up with OB provider in 2 weeks   3.   Thank you.     Dr. Marte    Patient Education     Adapting to Pregnancy: Third Trimester    Although common during pregnancy, some discomforts may seem worse in the final weeks. Simple lifestyle changes can help. Take care of yourself. And ask your partner to help out with small tasks.  Limiting leg problems  Ways to combat leg issues:    Wear support hose all day.    Avoid snug shoes and clothes that bind, like tight pants and socks with elastic tops.    Sit with your feet and legs raised often.  Caring for your breasts  Tips to follow include:    Wash with plain water. Avoid using harsh soaps or rubbing alcohol. They may cause dryness.    Wear a nursing bra for extra support. It can also hide any leaks from your nipples.  Controlling hemorrhoids  Ways to avoid hemorrhoids include:    Eat foods that are high in fiber. Also, exercise and drink enough fluids. This will reduce constipation and hemorrhoids.    Sleep and nap on your side. This limits pressure on the veins of your rectum.    Try not to stand or sit for long periods.  Controlling back pain  As your body changes during pregnancy, your back must work in new ways. Back pain is due to many causes. Physical changes in your body can strain your back and its supporting muscles. Also, hormones (chemicals that carry messages throughout the body) increase during pregnancy. This can affect how your muscles and joints work together. All of these changes can lead to pain. Pain may be felt in the upper or lower back. Pain is also common in the pelvis. Some pregnant women have sciatica. This is pain caused by pressure on the sciatic nerve running down the back of the leg. Ask your healthcare provider for specific tips and exercises to help control your back pain.  Tips to  help you rest  Good rest and sleep will help you feel better. Here are some ideas:    Ask your partner to massage your shoulders, neck, or back.    Limit the errands you do each day.    Lie down in the afternoon or after work for a few minutes.    Take a warm bath before you go to sleep.    Drink warm milk or teas without caffeine.    Avoid coffee, black tea, and cola.  Stopping heartburn    Avoid spicy, greasy, fried, or acidic foods.    Eat small amounts more often. Eat slowly.   Wait 2 hours after eating before lying down.    Sleep with your upper body raised 6 inches.   Managing mood swings  Ways to manage mood swings include:    Know that mood changes are normal.    Exercise often, but get plenty of rest.    Address any concerns and limit stress. Talking to your partner, other women, or your healthcare provider may help.  Dealing with urinary frequency  Tips to deal with having to urinate often include:    Drink plenty of water all day. If you drink a lot in the evening, though, you may have to get up more in the night.    Limit coffee, black tea, and cola.  [employer] last reviewed this educational content on 2/1/2018 2000-2021 [employer]. All rights reserved. This information is not intended as a substitute for professional medical care. Always follow your healthcare professional's instructions.

## 2022-01-04 ENCOUNTER — OFFICE VISIT (OUTPATIENT)
Dept: FAMILY MEDICINE | Facility: CLINIC | Age: 22
End: 2022-01-04
Payer: COMMERCIAL

## 2022-01-04 VITALS
HEART RATE: 119 BPM | WEIGHT: 166 LBS | RESPIRATION RATE: 19 BRPM | BODY MASS INDEX: 29.41 KG/M2 | DIASTOLIC BLOOD PRESSURE: 84 MMHG | SYSTOLIC BLOOD PRESSURE: 121 MMHG | TEMPERATURE: 98.2 F | OXYGEN SATURATION: 97 %

## 2022-01-04 DIAGNOSIS — O09.90 SUPERVISION OF HIGH RISK PREGNANCY, ANTEPARTUM: Primary | ICD-10-CM

## 2022-01-04 DIAGNOSIS — O26.872 SHORT CERVIX DURING PREGNANCY IN SECOND TRIMESTER: ICD-10-CM

## 2022-01-04 DIAGNOSIS — O34.32 CERVICAL CERCLAGE SUTURE PRESENT IN SECOND TRIMESTER: ICD-10-CM

## 2022-01-04 PROCEDURE — 99212 OFFICE O/P EST SF 10 MIN: CPT | Mod: GC | Performed by: STUDENT IN AN ORGANIZED HEALTH CARE EDUCATION/TRAINING PROGRAM

## 2022-01-04 NOTE — PROGRESS NOTES
Preceptor attestation:  Vital signs reviewed: /84 (BP Location: Left arm, Patient Position: Sitting, Cuff Size: Adult Regular)   Pulse 119   Temp 98.2  F (36.8  C) (Oral)   Resp 19   Wt 75.3 kg (166 lb)   LMP 06/04/2021 (Approximate)   SpO2 97%   BMI 29.41 kg/m      Patient seen, evaluated, and discussed with the resident.  I have verified the content of the note, which accurately reflects my assessment of the patient and the plan of care.    Supervising physician: Charley Jacobsen MD  [facility]

## 2022-01-04 NOTE — PROGRESS NOTES
Assessment & Plan  21 year old  at 32w0d with DESTIN Mar 1, 2022 based on 20 week US    Eh was seen today for prenatal care and medication reconciliation.    Diagnoses and all orders for this visit:    Supervision of high risk pregnancy, antepartum  Short cervix during pregnancy in second trimester  Cervical cerclage suture present in second trimester  Called OB RN to verify when next US appt was. Not yet scheduled. Need to follow up JOHN as well as cerclage/cervix. Patient will return here in two weeks.   - OB US ordered      Weight gain adequate: 14.1 kg (31 lb) to date, out of recommended total of 25-35 lbs (pregravid BMI 18.5-24.9)    There are no Patient Instructions on file for this visit.    Return to clinic in 2 weeks.    Neeta Jones MD PGY3  I precepted today with Charley Jacobsen MD.    Subjective  Concerns: None - getting tired.     ROS:  No - Headache  No - Changes in vision  No - Chest Pain  No - Shortness of Breath  No - Nausea   No - Vomiting  No - Abdominal pain   No - Contractions  No - Dysuria   No - Vaginal Discharge    No - Vaginal bleeding   No - Loss of Fluid   No - Extremity swelling   Present - Fetal movement       Going to WIC? Yes    Patient Active Problem List   Diagnosis     Short cervix during pregnancy in second trimester     Supervision of high risk pregnancy, antepartum     Late prenatal care affecting pregnancy in second trimester     Cervical cerclage suture present in second trimester     At risk for diabetes mellitus     High-risk first pregnancy of young woman, unspecified trimester       Eh G Lin speaks English so an  was not used today.    Guidance:    seatbelt use  fetal growth and movement  signs of  labor    Planning on breastfeeding.   No circumcision.    Do you need help getting a car seat? No  Do you need help getting a breast pump? No    Objective  /84 (BP Location: Left arm, Patient Position: Sitting, Cuff Size: Adult Regular)   Pulse  119   Temp 98.2  F (36.8  C) (Oral)   Resp 19   Wt 75.3 kg (166 lb)   LMP 06/04/2021 (Approximate)   SpO2 97%   BMI 29.41 kg/m    No distress.  Gravid abdomen.  FHT 140s.  Fundal height 30.5 cm.  no edema.    Results  Blood type: B POS  No results found for any visits on 01/04/22.

## 2022-01-10 ENCOUNTER — ANCILLARY PROCEDURE (OUTPATIENT)
Dept: ULTRASOUND IMAGING | Facility: CLINIC | Age: 22
End: 2022-01-10
Payer: COMMERCIAL

## 2022-01-10 DIAGNOSIS — O26.872 SHORT CERVIX DURING PREGNANCY IN SECOND TRIMESTER: ICD-10-CM

## 2022-01-10 PROCEDURE — 76816 OB US FOLLOW-UP PER FETUS: CPT | Performed by: RADIOLOGY

## 2022-01-18 ENCOUNTER — OFFICE VISIT (OUTPATIENT)
Dept: FAMILY MEDICINE | Facility: CLINIC | Age: 22
End: 2022-01-18
Payer: COMMERCIAL

## 2022-01-18 VITALS
SYSTOLIC BLOOD PRESSURE: 127 MMHG | TEMPERATURE: 97.8 F | DIASTOLIC BLOOD PRESSURE: 82 MMHG | WEIGHT: 169.4 LBS | OXYGEN SATURATION: 97 % | HEART RATE: 96 BPM | BODY MASS INDEX: 30.01 KG/M2 | RESPIRATION RATE: 16 BRPM

## 2022-01-18 DIAGNOSIS — Z32.00 ENCOUNTER FOR CONFIRMATION OF PREGNANCY TEST RESULT WITH PHYSICAL EXAMINATION: ICD-10-CM

## 2022-01-18 PROCEDURE — 99212 OFFICE O/P EST SF 10 MIN: CPT | Mod: GC

## 2022-01-18 RX ORDER — PRENATAL VIT/IRON FUM/FOLIC AC 27MG-0.8MG
1 TABLET ORAL DAILY
Qty: 90 TABLET | Refills: 3 | Status: SHIPPED | OUTPATIENT
Start: 2022-01-18 | End: 2024-03-20

## 2022-01-18 NOTE — PATIENT INSTRUCTIONS
Thank you for trusting us with your care.     Here's a summary of the visit today:   1. Refill for prenatal vitamins sent to pharmacy  2. We will have Gayle follow up about where to have cerclage removal  3. Birth control options discussed.   4. Follow up in 2 weeks      Thank you.     Dr. Marte          For information about establishing breastfeeding, go to [employer] website: [email or web address]      Patient Education     Breastfeeding  Why It's Important and What to Expect  Your breast milk is the best food for your baby--and breastfeeding can help you be healthy as well.   Though breastfeeding is natural, it is a learned process for both mother and baby. To prepare, there are things you can learn--and do--before your baby is born.     Learn the benefits of breastfeeding.    Understand the basic process.    Know what to expect in the hospital.    Arrange breastfeeding support for the first few weeks after birth.    Take a breastfeeding class (see back page).    Talk to your midwife, nurse or doctor if you have questions.  The benefits of breastfeeding   Human milk changes to meet the needs of a growing baby. It is all a baby needs for the first six months of life.   In fact, babies who receive only human milk for the first six months are less likely to develop colds, the flu, colic, asthma, ear infections, food allergies and diarrhea (loose, watery stools). They may be less likely to be overweight as children, and they are less likely to develop diabetes later in life. Some studies also show that infants have a higher IQ if they are .  Breastfeeding can:     Help you and your baby develop a special bond--and make you feel proud that you can feed your baby.    Reduce the total amount of blood you will lose after delivery.    Help your uterus return to its non-pregnant size.    Reduce the risk of Sudden Infant Death Syndrome (SIDS).    Help you lose your pregnancy weight   more quickly.    Help  "delay the return of your monthly periods.    Lower your risk of some breast and ovarian cancers--as well as osteoporosis (bone loss)--later in life.    Save you more than $300 per month. (This includes the cost of formula and medical bills. Formula-fed babies get sick more often.)  How to breastfeed   Skin-to-skin contact  Hold your baby on your chest skin-to-skin right after birth. Skin contact calms your baby, steadies his or her breathing and keeps your baby warm. Your baby will be alert and will likely want to feed within the first hour after birth.   Babies are born with reflexes that help them breastfeed. Your body will be ready with early milk (called colostrum), so you will have all the milk your baby needs for that first feeding. Your nurse will help you get started.  Keep your baby with you and breastfeed whenever he or she is hungry. Offering the breast early and often helps your body keep making lots of milk.  How to position your baby  There are many positions for breastfeeding.     No matter which position you choose, support your baby's back, shoulders and neck. The head and body should be in a straight line, and the entire body should face the breast. Your baby should be able to tilt the head back easily. He or she shouldn't have to reach out to feed.  Also make sure your baby's nose is level with your nipple. This way, he or she will find it easier to attach to your breast.   Finally, get comfortable. Use pillows to support your body. Don't lean over or \"slump\" to reach your baby. Once your baby is attached to the breast, it's okay to change your position slightly.   You will feel a bit of a tugging at first, but you should never feel pain. If you do, ask your nurse or lactation expert for help. She will teach you how to latch your baby onto the breast in a way that feels more comfortable.  Breastfeeding in the hospital  For the first three days after birth, your body will produce early milk called " "colostrum. This milk is full of calories and antibodies to help keep your baby healthy. It is all your baby needs for the first few days.   Remember, your baby does not eat anything while inside you. Right after birth, he or she will only need a little bit of milk (about 1 teaspoon per feeding) to get the digestive system working well. Your baby will not need any formula--your body will make the right amount of milk.   Breastfeed whenever your baby shows signs of hunger. (See next page for a list of signs.) Crying is a late sign of hunger.   Babies often lose weight in the days after birth. This is normal. By two weeks of age, your baby should be back to his or her birth weight. Your care team will watch your baby's weight carefully.  If you are unable to breastfeed in the hospital, your care team may suggest donor milk for your baby.  The Most Important Points to Remember    Your breast milk is the perfect food for your baby. Breastfeeding has a lot of health benefits for you as well.    Hold your baby skin-to-skin as soon as possible after birth. Do this for as long as you can. Even if your baby doesn't go to the breast right away, skin-to-skin contact helps your body make more milk. This lets you get an early start on breastfeeding.    Learn how to position your baby at the breast. This will help your baby feed well, and it will keep you comfortable.    Feed your baby whenever he or she wants to eat. You are feeding a baby, not a clock!    Signs that your baby is ready to eat include: starting to wake up, chewing on fists, moving the face from side to side, opening and closing the mouth, sticking out the tongue and turning toward the breast when held. Crying is a late sign of hunger, so look for the earlier signals that your baby makes.    Feed your baby only human milk for at least six months. The World Health Organization recommends breastfeeding for the first year, noting \"it is a jessica baby who is  " "for the first two years.\"    While in the hospital, plan to keep your baby with you at all times, except for certain medical procedures. This is an important time for you and your baby to get to know each other and practice breastfeeding.   Common questions  Below are questions that many women have about breastfeeding. You will find further information in the childbirth book your care team gave you. If you have more questions, speak with your midwife, nurse or doctor.  How do I involve my partner, family and friends and get their help and support?  Sometimes family and friends don't understand why you want to breastfeed. Perhaps they themselves didn't breastfeed, or they weren't  as infants. Tell them about the benefits of breastfeeding and how important it is to feed only human milk for the first six months or so. If you feed often, you will make plenty of milk to help your baby grow, fight illness and get the best start possible.   After two to four weeks--once your baby is feeding well and your milk supply is well established--your partner and others can feed the baby your milk from a cup, dropper or bottle. You can remove milk from your breast (by hand or pump) and store it for later use. This way, your baby will have your milk even when you're away.  Remind everyone that there are lots of ways to help that don't involve feeding: making meals, caring for your other children, comforting the baby if he or she cries, changing diapers, running errands and more.   Is breastfeeding painful?  Not usually. There are ways to prevent pain and to treat it if it happens.   The best ways to avoid pain are to feed your baby often, use a good position and correctly \"latch\" your baby onto the breast. These help prevent the two most common sources of pain: sore nipples and engorgement (overly full breasts). You will learn more about these topics in a breastfeeding class and in the hospital after your baby is born.   How " "much time does it take to breastfeed?  Some new mothers feel that all they do is breastfeed. In the early weeks, a baby eats 8 to 12 times per day. Sometimes babies will \"cluster\" feedings close together. At other times, there are longer stretches between feedings.   Remember, your 's stomach will be about the size of a walnut. He or she won't eat much at each feeding. If you feed your baby often in the first days, your baby--and your milk supply--will grow. Your baby will eat more at each session, and you will need to nurse less often. Within a few weeks, most women find that breastfeeding is easier and takes less time than formula feeding.  How will I know if my baby is getting enough milk?  Your body will start making milk as soon as your baby is born. The more often you put your baby to breast, the more milk you will make. You should avoid pacifiers for the first couple of weeks--you want your baby to do his or her sucking at the breast. This will help you make more milk.   There are signs that your baby is getting enough milk. You will learn these signs over time. For example:     You will count wet and soiled diapers, because these show how much milk your baby is getting.    Your baby will seem satisfied after feedings.    Your baby will grow and gain weight after the first few days.  Are there reasons why a woman shouldn't breastfeed her baby?  There are a few medical concerns that prevent breastfeeding. In mothers, these include being HIV-positive, having active or untreated TB (tuberculosis) and using street drugs or some medicines. These mothers usually choose infant formula for their babies.   A few women choose not to breastfeed for personal reasons. But most mothers can breastfeed. If you are not sure if it's okay to breastfeed, ask your care team.   Getting support  Before your baby is born, get as much information as you can. Sign up for a breastfeeding class. Most childbirth classes discuss " "breastfeeding, but a special class will give more in-depth information. Go to [email or web address] and click on \"classes\" at the top of the page.   Remember, help is available from your hospital, clinic, lactation experts and online. If you need help after leaving the hospital:    Call your baby's clinic. (If you don't have a clinic, call [phone] and ask for a referral.)    Call a [place] lactation consultant:  ? [facility]: [phone]  ? [facility]: [phone]  ? [facility]: [phone]  ? [facility]: [phone]  ? [facility]: [phone]  ? [facility]: [phone]  ? [facility]: [phone]  ? [facility]: [phone]  ? [facility]: [phone]    Call [facility] (24 hours a day) at [phone] [[phone]].    Call the Women, Infants and Children (WIC) program at [phone].    Call the National Women's Health Information Center (English and Malay) at 1-499.463.9728 or go to www.womenshealth.gov/breastfeeding.    Go to [email or web address].  For informational purposes only. Not to replace the advice of your health care provider.   Copyright   2010 [facility]. All rights reserved. [employer] 236040 - REV 06/17.           "

## 2022-01-18 NOTE — PROGRESS NOTES
Assessment & Plan  21 year old  at 34w0d with DESTIN Mar 1, 2022 based on 20 week US    Eh was seen today for prenatal care.    Diagnoses and all orders for this visit:    Encounter for confirmation of pregnancy test result with physical examination  -     Prenatal Vit-Fe Fumarate-FA (PRENATAL MULTIVITAMIN W/IRON) 27-0.8 MG tablet; Take 1 tablet by mouth daily    -contraception planning information provided.      Weight gain adequate: 15.6 kg (34 lb 6.4 oz) to date, out of recommended total of 11-20 lbs (pregravid BMI >30)    Patient Instructions     Thank you for trusting us with your care.     Here's a summary of the visit today:   1. Refill for prenatal vitamins sent to pharmacy  2. We will have Gayle follow up about where to have cerclage removal  3. Follow up in 2 weeks      Thank you.     Dr. Marte          For information about establishing breastfeeding, go to [employer] website: [email or web address]      Patient Education     Breastfeeding  Why It's Important and What to Expect  Your breast milk is the best food for your baby--and breastfeeding can help you be healthy as well.   Though breastfeeding is natural, it is a learned process for both mother and baby. To prepare, there are things you can learn--and do--before your baby is born.     Learn the benefits of breastfeeding.    Understand the basic process.    Know what to expect in the hospital.    Arrange breastfeeding support for the first few weeks after birth.    Take a breastfeeding class (see back page).    Talk to your midwife, nurse or doctor if you have questions.  The benefits of breastfeeding   Human milk changes to meet the needs of a growing baby. It is all a baby needs for the first six months of life.   In fact, babies who receive only human milk for the first six months are less likely to develop colds, the flu, colic, asthma, ear infections, food allergies and diarrhea (loose, watery stools). They may be less likely to be  overweight as children, and they are less likely to develop diabetes later in life. Some studies also show that infants have a higher IQ if they are .  Breastfeeding can:     Help you and your baby develop a special bond--and make you feel proud that you can feed your baby.    Reduce the total amount of blood you will lose after delivery.    Help your uterus return to its non-pregnant size.    Reduce the risk of Sudden Infant Death Syndrome (SIDS).    Help you lose your pregnancy weight   more quickly.    Help delay the return of your monthly periods.    Lower your risk of some breast and ovarian cancers--as well as osteoporosis (bone loss)--later in life.    Save you more than $300 per month. (This includes the cost of formula and medical bills. Formula-fed babies get sick more often.)  How to breastfeed   Skin-to-skin contact  Hold your baby on your chest skin-to-skin right after birth. Skin contact calms your baby, steadies his or her breathing and keeps your baby warm. Your baby will be alert and will likely want to feed within the first hour after birth.   Babies are born with reflexes that help them breastfeed. Your body will be ready with early milk (called colostrum), so you will have all the milk your baby needs for that first feeding. Your nurse will help you get started.  Keep your baby with you and breastfeed whenever he or she is hungry. Offering the breast early and often helps your body keep making lots of milk.  How to position your baby  There are many positions for breastfeeding.     No matter which position you choose, support your baby's back, shoulders and neck. The head and body should be in a straight line, and the entire body should face the breast. Your baby should be able to tilt the head back easily. He or she shouldn't have to reach out to feed.  Also make sure your baby's nose is level with your nipple. This way, he or she will find it easier to attach to your breast.   Finally,  "get comfortable. Use pillows to support your body. Don't lean over or \"slump\" to reach your baby. Once your baby is attached to the breast, it's okay to change your position slightly.   You will feel a bit of a tugging at first, but you should never feel pain. If you do, ask your nurse or lactation expert for help. She will teach you how to latch your baby onto the breast in a way that feels more comfortable.  Breastfeeding in the hospital  For the first three days after birth, your body will produce early milk called colostrum. This milk is full of calories and antibodies to help keep your baby healthy. It is all your baby needs for the first few days.   Remember, your baby does not eat anything while inside you. Right after birth, he or she will only need a little bit of milk (about 1 teaspoon per feeding) to get the digestive system working well. Your baby will not need any formula--your body will make the right amount of milk.   Breastfeed whenever your baby shows signs of hunger. (See next page for a list of signs.) Crying is a late sign of hunger.   Babies often lose weight in the days after birth. This is normal. By two weeks of age, your baby should be back to his or her birth weight. Your care team will watch your baby's weight carefully.  If you are unable to breastfeed in the hospital, your care team may suggest donor milk for your baby.  The Most Important Points to Remember    Your breast milk is the perfect food for your baby. Breastfeeding has a lot of health benefits for you as well.    Hold your baby skin-to-skin as soon as possible after birth. Do this for as long as you can. Even if your baby doesn't go to the breast right away, skin-to-skin contact helps your body make more milk. This lets you get an early start on breastfeeding.    Learn how to position your baby at the breast. This will help your baby feed well, and it will keep you comfortable.    Feed your baby whenever he or she wants to " "eat. You are feeding a baby, not a clock!    Signs that your baby is ready to eat include: starting to wake up, chewing on fists, moving the face from side to side, opening and closing the mouth, sticking out the tongue and turning toward the breast when held. Crying is a late sign of hunger, so look for the earlier signals that your baby makes.    Feed your baby only human milk for at least six months. The World Health Organization recommends breastfeeding for the first year, noting \"it is a jessica baby who is  for the first two years.\"    While in the hospital, plan to keep your baby with you at all times, except for certain medical procedures. This is an important time for you and your baby to get to know each other and practice breastfeeding.   Common questions  Below are questions that many women have about breastfeeding. You will find further information in the childbirth book your care team gave you. If you have more questions, speak with your midwife, nurse or doctor.  How do I involve my partner, family and friends and get their help and support?  Sometimes family and friends don't understand why you want to breastfeed. Perhaps they themselves didn't breastfeed, or they weren't  as infants. Tell them about the benefits of breastfeeding and how important it is to feed only human milk for the first six months or so. If you feed often, you will make plenty of milk to help your baby grow, fight illness and get the best start possible.   After two to four weeks--once your baby is feeding well and your milk supply is well established--your partner and others can feed the baby your milk from a cup, dropper or bottle. You can remove milk from your breast (by hand or pump) and store it for later use. This way, your baby will have your milk even when you're away.  Remind everyone that there are lots of ways to help that don't involve feeding: making meals, caring for your other children, comforting " "the baby if he or she cries, changing diapers, running errands and more.   Is breastfeeding painful?  Not usually. There are ways to prevent pain and to treat it if it happens.   The best ways to avoid pain are to feed your baby often, use a good position and correctly \"latch\" your baby onto the breast. These help prevent the two most common sources of pain: sore nipples and engorgement (overly full breasts). You will learn more about these topics in a breastfeeding class and in the hospital after your baby is born.   How much time does it take to breastfeed?  Some new mothers feel that all they do is breastfeed. In the early weeks, a baby eats 8 to 12 times per day. Sometimes babies will \"cluster\" feedings close together. At other times, there are longer stretches between feedings.   Remember, your 's stomach will be about the size of a walnut. He or she won't eat much at each feeding. If you feed your baby often in the first days, your baby--and your milk supply--will grow. Your baby will eat more at each session, and you will need to nurse less often. Within a few weeks, most women find that breastfeeding is easier and takes less time than formula feeding.  How will I know if my baby is getting enough milk?  Your body will start making milk as soon as your baby is born. The more often you put your baby to breast, the more milk you will make. You should avoid pacifiers for the first couple of weeks--you want your baby to do his or her sucking at the breast. This will help you make more milk.   There are signs that your baby is getting enough milk. You will learn these signs over time. For example:     You will count wet and soiled diapers, because these show how much milk your baby is getting.    Your baby will seem satisfied after feedings.    Your baby will grow and gain weight after the first few days.  Are there reasons why a woman shouldn't breastfeed her baby?  There are a few medical concerns that " "prevent breastfeeding. In mothers, these include being HIV-positive, having active or untreated TB (tuberculosis) and using street drugs or some medicines. These mothers usually choose infant formula for their babies.   A few women choose not to breastfeed for personal reasons. But most mothers can breastfeed. If you are not sure if it's okay to breastfeed, ask your care team.   Getting support  Before your baby is born, get as much information as you can. Sign up for a breastfeeding class. Most childbirth classes discuss breastfeeding, but a special class will give more in-depth information. Go to www.[place].org and click on \"classes\" at the top of the page.   Remember, help is available from your hospital, clinic, lactation experts and online. If you need help after leaving the hospital:    Call your baby's clinic. (If you don't have a clinic, call [phone] and ask for a referral.)    Call a [place] lactation consultant:  ? [facility]: [phone]  ? [facility]: [phone]  ? [facility]: [phone]  ? [facility]: [phone]  ? [facility]: [phone]  ? [facility]: [phone]  ? [facility]: [phone]  ? [facility]: [phone]  ? [facility]: [phone]    Call [employer] (24 hours a day) at 8-325-SG-LECHE [[phone]].    Call the Women, Infants and Children (WIC) program at 1-688.690.4855.    Call the National Women's Health Information Center (English and Australian) at 1-629.444.4448 or go to www.womenshealth.gov/breastfeeding.    Go to [employer].  For informational purposes only. Not to replace the advice of your health care provider.   Copyright   2010 [place] [employer]. All rights reserved. [employer] 756940 - REV 06/17.               Return to clinic in 2 " weeks.    Nicholas Marte DO, PGY1  [facility]    Today I precepted with Dr. Kelly Hensley MD, who agrees with the assessment and plan.    Subjective  Concerns: none    ROS:  No - Headache  No - Changes in vision  No - Chest Pain  No - Shortness of Breath  No - Nausea   No - Vomiting  YES - Abdominal pain at night, resolves with reposiitoning  No - Contractions  No - Dysuria   No - Vaginal Discharge    No - Vaginal bleeding   No - Loss of Fluid   No - Extremity swelling   Present - Fetal movement       Going to WIC? Yes    Patient Active Problem List   Diagnosis     Short cervix during pregnancy in second trimester     Supervision of high risk pregnancy, antepartum     Late prenatal care affecting pregnancy in second trimester     Cervical cerclage suture present in second trimester     At risk for diabetes mellitus     High-risk first pregnancy of young woman, unspecified trimester       Eh G Lin speaks English so an  was not used today.    Guidance:  seatbelt use  fetal growth and movement  RhoGAM  signs of  labor    Do you need help getting a car seat? No  Do you need help getting a breast pump? Yes    Objective  /82 (BP Location: Left arm, Patient Position: Sitting, Cuff Size: Adult Regular)   Pulse 96   Temp 97.8  F (36.6  C) (Oral)   Resp 16   Wt 76.8 kg (169 lb 6.4 oz)   LMP 2021 (Approximate)   SpO2 97%   BMI 30.01 kg/m    No distress.  Gravid abdomen.  FHT 140s.  Fundal height 35 cm.  no edema.    Results  Blood type: B POS  No results found for any visits on 22.

## 2022-01-18 NOTE — PROGRESS NOTES
Preceptor Attestation:  Vitals:    01/18/22 0845   BP: 127/82   BP Location: Left arm   Patient Position: Sitting   Cuff Size: Adult Regular   Pulse: 96   Resp: 16   Temp: 97.8  F (36.6  C)   TempSrc: Oral   SpO2: 97%   Weight: 76.8 kg (169 lb 6.4 oz)          I discussed the patient with the resident and evaluated the patient in person. I have verified the content of the note, which accurately reflects my assessment of the patient and the plan of care.   Supervising Physician:  Kelly Hensley MD

## 2022-01-19 DIAGNOSIS — O26.879 SHORT CERVIX AFFECTING PREGNANCY: ICD-10-CM

## 2022-01-19 DIAGNOSIS — O26.872 SHORT CERVIX DURING PREGNANCY IN SECOND TRIMESTER: Primary | ICD-10-CM

## 2022-02-02 ENCOUNTER — HOSPITAL ENCOUNTER (OUTPATIENT)
Facility: CLINIC | Age: 22
Discharge: HOME | End: 2022-02-02
Attending: OBSTETRICS & GYNECOLOGY | Admitting: OBSTETRICS & GYNECOLOGY
Payer: COMMERCIAL

## 2022-02-02 ENCOUNTER — OFFICE VISIT (OUTPATIENT)
Dept: MATERNAL FETAL MEDICINE | Facility: HOSPITAL | Age: 22
End: 2022-02-02
Attending: OBSTETRICS & GYNECOLOGY
Payer: COMMERCIAL

## 2022-02-02 ENCOUNTER — ANCILLARY PROCEDURE (OUTPATIENT)
Dept: ULTRASOUND IMAGING | Facility: HOSPITAL | Age: 22
End: 2022-02-02
Attending: OBSTETRICS & GYNECOLOGY
Payer: COMMERCIAL

## 2022-02-02 VITALS — TEMPERATURE: 98.1 F | DIASTOLIC BLOOD PRESSURE: 72 MMHG | SYSTOLIC BLOOD PRESSURE: 136 MMHG | RESPIRATION RATE: 16 BRPM

## 2022-02-02 DIAGNOSIS — O26.879 SHORT CERVIX AFFECTING PREGNANCY: ICD-10-CM

## 2022-02-02 DIAGNOSIS — O34.32 CERVICAL CERCLAGE SUTURE PRESENT IN SECOND TRIMESTER: Primary | ICD-10-CM

## 2022-02-02 PROBLEM — O09.293: Status: ACTIVE | Noted: 2021-10-25

## 2022-02-02 PROBLEM — Z98.890: Status: ACTIVE | Noted: 2021-10-25

## 2022-02-02 PROBLEM — O34.30 CERVICAL CERCLAGE SUTURE PRESENT: Status: ACTIVE | Noted: 2022-02-02

## 2022-02-02 PROCEDURE — 250N000013 HC RX MED GY IP 250 OP 250 PS 637: Performed by: OBSTETRICS & GYNECOLOGY

## 2022-02-02 PROCEDURE — 99207 PR NO CHARGE LOS: CPT | Performed by: OBSTETRICS & GYNECOLOGY

## 2022-02-02 PROCEDURE — 76816 OB US FOLLOW-UP PER FETUS: CPT

## 2022-02-02 PROCEDURE — 59899 UNLISTED PX MAT CARE&DLVR: CPT

## 2022-02-02 PROCEDURE — 76816 OB US FOLLOW-UP PER FETUS: CPT | Mod: 26 | Performed by: OBSTETRICS & GYNECOLOGY

## 2022-02-02 RX ORDER — NALOXONE HYDROCHLORIDE 0.4 MG/ML
0.2 INJECTION, SOLUTION INTRAMUSCULAR; INTRAVENOUS; SUBCUTANEOUS
Status: DISCONTINUED | OUTPATIENT
Start: 2022-02-02 | End: 2022-02-02 | Stop reason: HOSPADM

## 2022-02-02 RX ORDER — PROCHLORPERAZINE MALEATE 10 MG
10 TABLET ORAL EVERY 6 HOURS PRN
Status: DISCONTINUED | OUTPATIENT
Start: 2022-02-02 | End: 2022-02-02 | Stop reason: HOSPADM

## 2022-02-02 RX ORDER — ONDANSETRON 2 MG/ML
4 INJECTION INTRAMUSCULAR; INTRAVENOUS EVERY 6 HOURS PRN
Status: DISCONTINUED | OUTPATIENT
Start: 2022-02-02 | End: 2022-02-02 | Stop reason: HOSPADM

## 2022-02-02 RX ORDER — NALOXONE HYDROCHLORIDE 0.4 MG/ML
0.4 INJECTION, SOLUTION INTRAMUSCULAR; INTRAVENOUS; SUBCUTANEOUS
Status: DISCONTINUED | OUTPATIENT
Start: 2022-02-02 | End: 2022-02-02 | Stop reason: HOSPADM

## 2022-02-02 RX ORDER — ONDANSETRON 4 MG/1
4 TABLET, ORALLY DISINTEGRATING ORAL EVERY 6 HOURS PRN
Status: DISCONTINUED | OUTPATIENT
Start: 2022-02-02 | End: 2022-02-02 | Stop reason: HOSPADM

## 2022-02-02 RX ORDER — OXYCODONE HYDROCHLORIDE 5 MG/1
5 TABLET ORAL EVERY 4 HOURS PRN
Status: DISCONTINUED | OUTPATIENT
Start: 2022-02-02 | End: 2022-02-02 | Stop reason: HOSPADM

## 2022-02-02 RX ORDER — METOCLOPRAMIDE HYDROCHLORIDE 5 MG/ML
10 INJECTION INTRAMUSCULAR; INTRAVENOUS EVERY 6 HOURS PRN
Status: DISCONTINUED | OUTPATIENT
Start: 2022-02-02 | End: 2022-02-02 | Stop reason: HOSPADM

## 2022-02-02 RX ORDER — METOCLOPRAMIDE 10 MG/1
10 TABLET ORAL EVERY 6 HOURS PRN
Status: DISCONTINUED | OUTPATIENT
Start: 2022-02-02 | End: 2022-02-02 | Stop reason: HOSPADM

## 2022-02-02 RX ORDER — PROCHLORPERAZINE 25 MG
25 SUPPOSITORY, RECTAL RECTAL EVERY 12 HOURS PRN
Status: DISCONTINUED | OUTPATIENT
Start: 2022-02-02 | End: 2022-02-02 | Stop reason: HOSPADM

## 2022-02-02 RX ADMIN — OXYCODONE HYDROCHLORIDE 5 MG: 5 TABLET ORAL at 11:14

## 2022-02-02 NOTE — NURSING NOTE
Patient arrived for scheduled ultrasound and possible cerclage removal. Dr. Nelson attempted to remove the cerclage but unable. Dr. Nelson called [facility] provider on service, Dr. Carvalho and spoke with her along with Irene L&D Charge. Map/directions/instructions given to the patient to present to L&D.  verbalized understanding and is agreeable to the plan. Left [facility] ambulatory and stable.

## 2022-02-02 NOTE — DISCHARGE INSTRUCTIONS
Discharge Instruction for Undelivered Patients      You were seen for: Cerclage removal  We Consulted: Dr. Carvalho  You had (Test or Medicine): cerclage removal and fetal and uterine monitoring after removal    Diet:   Drink 8 to 12 glasses of liquids (milk, juice, water) every day.  You may eat meals and snacks.     Activity:  Call your doctor or nurse midwife if your baby is moving less than usual.     Call your provider if you notice:  Swelling in your face or increased swelling in your hands or legs.  Headaches that are not relieved by Tylenol (acetaminophen).  Changes in your vision (blurring: seeing spots or stars.)  Nausea (sick to your stomach) and vomiting (throwing up).   Weight gain of 5 pounds or more per week.  Heartburn that doesn't go away.  Signs of bladder infection: pain when you urinate (use the toilet), need to go more often and more urgently.  The bag of vaughn (rupture of membranes) breaks, or you notice leaking in your underwear.  Bright red blood in your underwear.  Abdominal (lower belly) or stomach pain.  For first baby: Contractions (tightening) less than 5 minutes apart for one hour or more.  Second (plus) baby: Contractions (tightening) less than 10 minutes apart and getting stronger.  *If less than 34 weeks: Contractions (tightening) more than 6 times in one hour.  Increase or change in vaginal discharge (note the color and amount)  Other:     Follow-up:  As scheduled in the clinic

## 2022-02-02 NOTE — PLAN OF CARE
OK to discharge per MD. Discharge instructions given, pt has no further questions. Discharge to home and self care with .

## 2022-02-02 NOTE — H&P
L&D History and Physical   2022  Tigre Ceballos  [id number]    Admission Date: 2022   PCP: Nicholas Marte     HPI: Tigre Ceballos is a 21 year old  at 36w1d here for attempted Shirodkar cerclage removal after failed attempt in clinic.    She notes she did have some cramping overnight last night and also some vaginal bleeding this morning, prior to cerclage removal attempt.  Otherwise no complaints.    Pregnancy notable for:  - cervical insufficiency requiring exam indicated cerclage placement    OBHX:   OB History    Para Term  AB Living   1 0 0 0 0 0   SAB IAB Ectopic Multiple Live Births   0 0 0 0 0      # Outcome Date GA Lbr Hemanth/2nd Weight Sex Delivery Anes PTL Lv   1 Current                MedicalHX:   Past Medical History:   Diagnosis Date     Cervical incompetence        SurgicalHX:   Past Surgical History:   Procedure Laterality Date     CERCLAGE CERVICAL N/A 10/25/2021    Procedure: CERCLAGE, CERVIX, VAGINAL APPROACH;  Surgeon: Patrick Gallegos MD;  Location: [facility] L[facility]       Medications:   Prenatal vitamin    Allergies:   No Known Allergies    FamilyHX:   Family History   Problem Relation Age of Onset     Hypertension Mother      Diabetes Mother      Cancer Father      Heart Disease No family hx of        SocialHX:   Social History     Socioeconomic History     Marital status:      Spouse name: Be Be Ci     Number of children: 0     Years of education: 14     Highest education level: Associate degree: occupational, technical, or vocational program   Occupational History     Occupation: homemaker   Tobacco Use     Smoking status: Never Smoker     Smokeless tobacco: Never Used   Substance and Sexual Activity     Alcohol use: Never     Drug use: Never     Sexual activity: Not on file   Other Topics Concern     Not on file   Social History Narrative     Not on file     Social Determinants of Health     Financial Resource Strain: Not on file   Food Insecurity: Not on file    Transportation Needs: Not on file   Physical Activity: Not on file   Stress: Not on file   Social Connections: Not on file   Intimate Partner Violence: Not on file   Housing Stability: Not on file       ROS: 10-point ROS negative except as in HPI     Physical Exam:  Vitals:    02/02/22 1040   BP: 136/72   Resp: 16   Temp: 98.1  F (36.7  C)   TempSrc: Oral     GEN: resting comfortably in bed, NAD   ABD: soft, gravid, non-tender, non-distended  EXT: no edema, non tender to palpation  CVX: on sterile speculum exam Zoyaodkar knot seen at 12 o'clock.  Knot buried, but able to see under the knot with gentle traction.  Suture cut under the knot and entire cerclage removed intact.  Cervix visually closed after procedure, digital exam deferred at this time.    NST:  FHT: baseline 140, moderate variability, + accels, - decels  TOCO: irritable    Ultrasounds:  Growth US today with EFW 6lb 2oz (44%)  Cephalic presentation       Lab Results   Component Value Date    AS Negative 10/25/2021    HEPBANG Nonreactive 10/25/2021    CHPCRT Negative 10/27/2021    GCPCRT Negative 10/25/2021    HGB 11.1 (L) 11/23/2021       GBS Status:   No results found for: GBS    No results found for: PAP    A/P: Eh is a 21 year old G1 at 36 1/7 weeks.  Presented for cerclage removal.  Cerclage was removed under direct visualization without apparent complications.    - oxycodone 5 mg once now  - NST/toco for at least 20 min to 1 hour  - if ren will rule out labor  - if not ren and reassuring fetal status, will plan for discharge to home to await spontandous labor    Ana Laura Carvalho MD  2/2/2022 11:00 AM

## 2022-02-02 NOTE — PROGRESS NOTES
Please see full imaging report from [facility] program under imaging tab.    Wero Nelson MD  Maternal Fetal Medicine

## 2022-02-03 ENCOUNTER — HOSPITAL ENCOUNTER (INPATIENT)
Facility: CLINIC | Age: 22
LOS: 4 days | Discharge: HOME | End: 2022-02-07
Attending: FAMILY MEDICINE | Admitting: FAMILY MEDICINE
Payer: COMMERCIAL

## 2022-02-03 DIAGNOSIS — O09.90 SUPERVISION OF HIGH RISK PREGNANCY, ANTEPARTUM: Primary | ICD-10-CM

## 2022-02-03 PROBLEM — Z36.89 ENCOUNTER FOR TRIAGE IN PREGNANT PATIENT: Status: ACTIVE | Noted: 2022-02-03

## 2022-02-03 LAB
ABO/RH(D): NORMAL
ANTIBODY SCREEN: NEGATIVE
RUPTURE OF FETAL MEMBRANES BY ROM PLUS: POSITIVE
SARS-COV-2 RNA RESP QL NAA+PROBE: NEGATIVE
SPECIMEN EXPIRATION DATE: NORMAL

## 2022-02-03 PROCEDURE — 87653 STREP B DNA AMP PROBE: CPT

## 2022-02-03 PROCEDURE — 87635 SARS-COV-2 COVID-19 AMP PRB: CPT | Performed by: FAMILY MEDICINE

## 2022-02-03 PROCEDURE — 84112 EVAL AMNIOTIC FLUID PROTEIN: CPT | Performed by: FAMILY MEDICINE

## 2022-02-03 PROCEDURE — 120N000001 HC R&B MED SURG/OB

## 2022-02-03 PROCEDURE — 86850 RBC ANTIBODY SCREEN: CPT

## 2022-02-03 PROCEDURE — 86780 TREPONEMA PALLIDUM: CPT

## 2022-02-03 RX ORDER — MISOPROSTOL 200 UG/1
800 TABLET ORAL
Status: DISCONTINUED | OUTPATIENT
Start: 2022-02-03 | End: 2022-02-04 | Stop reason: HOSPADM

## 2022-02-03 RX ORDER — LIDOCAINE 40 MG/G
CREAM TOPICAL
Status: DISCONTINUED | OUTPATIENT
Start: 2022-02-03 | End: 2022-02-03 | Stop reason: HOSPADM

## 2022-02-03 RX ORDER — NALOXONE HYDROCHLORIDE 0.4 MG/ML
0.2 INJECTION, SOLUTION INTRAMUSCULAR; INTRAVENOUS; SUBCUTANEOUS
Status: DISCONTINUED | OUTPATIENT
Start: 2022-02-03 | End: 2022-02-04 | Stop reason: HOSPADM

## 2022-02-03 RX ORDER — NALOXONE HYDROCHLORIDE 0.4 MG/ML
0.4 INJECTION, SOLUTION INTRAMUSCULAR; INTRAVENOUS; SUBCUTANEOUS
Status: DISCONTINUED | OUTPATIENT
Start: 2022-02-03 | End: 2022-02-04 | Stop reason: HOSPADM

## 2022-02-03 RX ORDER — METHYLERGONOVINE MALEATE 0.2 MG/ML
200 INJECTION INTRAVENOUS
Status: DISCONTINUED | OUTPATIENT
Start: 2022-02-03 | End: 2022-02-04 | Stop reason: HOSPADM

## 2022-02-03 RX ORDER — KETOROLAC TROMETHAMINE 30 MG/ML
30 INJECTION, SOLUTION INTRAMUSCULAR; INTRAVENOUS
Status: DISCONTINUED | OUTPATIENT
Start: 2022-02-03 | End: 2022-02-07 | Stop reason: HOSPADM

## 2022-02-03 RX ORDER — ONDANSETRON 4 MG/1
4 TABLET, ORALLY DISINTEGRATING ORAL EVERY 6 HOURS PRN
Status: DISCONTINUED | OUTPATIENT
Start: 2022-02-03 | End: 2022-02-04 | Stop reason: HOSPADM

## 2022-02-03 RX ORDER — CARBOPROST TROMETHAMINE 250 UG/ML
250 INJECTION, SOLUTION INTRAMUSCULAR
Status: DISCONTINUED | OUTPATIENT
Start: 2022-02-03 | End: 2022-02-04 | Stop reason: HOSPADM

## 2022-02-03 RX ORDER — OXYTOCIN 10 [USP'U]/ML
10 INJECTION, SOLUTION INTRAMUSCULAR; INTRAVENOUS
Status: DISCONTINUED | OUTPATIENT
Start: 2022-02-03 | End: 2022-02-04 | Stop reason: HOSPADM

## 2022-02-03 RX ORDER — ACETAMINOPHEN 325 MG/1
650 TABLET ORAL EVERY 4 HOURS PRN
Status: DISCONTINUED | OUTPATIENT
Start: 2022-02-03 | End: 2022-02-04 | Stop reason: HOSPADM

## 2022-02-03 RX ORDER — IBUPROFEN 600 MG/1
600 TABLET, FILM COATED ORAL
Status: DISCONTINUED | OUTPATIENT
Start: 2022-02-03 | End: 2022-02-07 | Stop reason: HOSPADM

## 2022-02-03 RX ORDER — OXYTOCIN/0.9 % SODIUM CHLORIDE 30/500 ML
100-340 PLASTIC BAG, INJECTION (ML) INTRAVENOUS CONTINUOUS PRN
Status: DISCONTINUED | OUTPATIENT
Start: 2022-02-03 | End: 2022-02-07 | Stop reason: HOSPADM

## 2022-02-03 RX ORDER — OXYTOCIN/0.9 % SODIUM CHLORIDE 30/500 ML
340 PLASTIC BAG, INJECTION (ML) INTRAVENOUS CONTINUOUS PRN
Status: DISCONTINUED | OUTPATIENT
Start: 2022-02-03 | End: 2022-02-04 | Stop reason: HOSPADM

## 2022-02-03 RX ORDER — METOCLOPRAMIDE HYDROCHLORIDE 5 MG/ML
10 INJECTION INTRAMUSCULAR; INTRAVENOUS EVERY 6 HOURS PRN
Status: DISCONTINUED | OUTPATIENT
Start: 2022-02-03 | End: 2022-02-04 | Stop reason: HOSPADM

## 2022-02-03 RX ORDER — ONDANSETRON 2 MG/ML
4 INJECTION INTRAMUSCULAR; INTRAVENOUS EVERY 6 HOURS PRN
Status: DISCONTINUED | OUTPATIENT
Start: 2022-02-03 | End: 2022-02-04 | Stop reason: HOSPADM

## 2022-02-03 RX ORDER — MISOPROSTOL 200 UG/1
400 TABLET ORAL
Status: DISCONTINUED | OUTPATIENT
Start: 2022-02-03 | End: 2022-02-04 | Stop reason: HOSPADM

## 2022-02-03 RX ORDER — METOCLOPRAMIDE 10 MG/1
10 TABLET ORAL EVERY 6 HOURS PRN
Status: DISCONTINUED | OUTPATIENT
Start: 2022-02-03 | End: 2022-02-04 | Stop reason: HOSPADM

## 2022-02-03 RX ORDER — OXYTOCIN 10 [USP'U]/ML
10 INJECTION, SOLUTION INTRAMUSCULAR; INTRAVENOUS
Status: DISCONTINUED | OUTPATIENT
Start: 2022-02-03 | End: 2022-02-07 | Stop reason: HOSPADM

## 2022-02-03 RX ORDER — PROCHLORPERAZINE MALEATE 10 MG
10 TABLET ORAL EVERY 6 HOURS PRN
Status: DISCONTINUED | OUTPATIENT
Start: 2022-02-03 | End: 2022-02-04 | Stop reason: HOSPADM

## 2022-02-03 RX ORDER — PROCHLORPERAZINE 25 MG
25 SUPPOSITORY, RECTAL RECTAL EVERY 12 HOURS PRN
Status: DISCONTINUED | OUTPATIENT
Start: 2022-02-03 | End: 2022-02-04 | Stop reason: HOSPADM

## 2022-02-03 ASSESSMENT — ACTIVITIES OF DAILY LIVING (ADL)
PATIENT_/_FAMILY_COMMUNICATION_STYLE: SPOKEN LANGUAGE (ENGLISH OR BILINGUAL)
FALL_HISTORY_WITHIN_LAST_SIX_MONTHS: NO
DOING_ERRANDS_INDEPENDENTLY_DIFFICULTY: NO
WEAR_GLASSES_OR_BLIND: NO
CONCENTRATING,_REMEMBERING_OR_MAKING_DECISIONS_DIFFICULTY: NO
DIFFICULTY_EATING/SWALLOWING: NO
HEARING_DIFFICULTY_OR_DEAF: NO
TOILETING_ISSUES: NO
WALKING_OR_CLIMBING_STAIRS_DIFFICULTY: NO
DIFFICULTY_COMMUNICATING: NO
DRESSING/BATHING_DIFFICULTY: NO

## 2022-02-03 ASSESSMENT — MIFFLIN-ST. JEOR: SCORE: 1500.24

## 2022-02-04 ENCOUNTER — ANESTHESIA EVENT (OUTPATIENT)
Dept: OBGYN | Facility: CLINIC | Age: 22
End: 2022-02-04
Payer: COMMERCIAL

## 2022-02-04 ENCOUNTER — ANESTHESIA (OUTPATIENT)
Dept: OBGYN | Facility: CLINIC | Age: 22
End: 2022-02-04
Payer: COMMERCIAL

## 2022-02-04 LAB
GP B STREP DNA SPEC QL NAA+PROBE: NEGATIVE
HGB BLD-MCNC: 11.4 G/DL (ref 11.7–15.7)
PATIENT PENICILLIN, AMOXICILLIN, CEPHALOSPORINS ALLERGY: NO
T PALLIDUM AB SER QL: NONREACTIVE

## 2022-02-04 PROCEDURE — 258N000003 HC RX IP 258 OP 636

## 2022-02-04 PROCEDURE — 85018 HEMOGLOBIN: CPT | Performed by: OBSTETRICS & GYNECOLOGY

## 2022-02-04 PROCEDURE — 36415 COLL VENOUS BLD VENIPUNCTURE: CPT | Performed by: OBSTETRICS & GYNECOLOGY

## 2022-02-04 PROCEDURE — 258N000003 HC RX IP 258 OP 636: Performed by: ANESTHESIOLOGY

## 2022-02-04 PROCEDURE — 250N000013 HC RX MED GY IP 250 OP 250 PS 637

## 2022-02-04 PROCEDURE — 258N000003 HC RX IP 258 OP 636: Performed by: FAMILY MEDICINE

## 2022-02-04 PROCEDURE — 120N000001 HC R&B MED SURG/OB

## 2022-02-04 PROCEDURE — 250N000011 HC RX IP 250 OP 636: Performed by: ANESTHESIOLOGY

## 2022-02-04 PROCEDURE — 258N000003 HC RX IP 258 OP 636: Performed by: OBSTETRICS & GYNECOLOGY

## 2022-02-04 PROCEDURE — 250N000013 HC RX MED GY IP 250 OP 250 PS 637: Performed by: OBSTETRICS & GYNECOLOGY

## 2022-02-04 PROCEDURE — 250N000011 HC RX IP 250 OP 636

## 2022-02-04 PROCEDURE — 250N000009 HC RX 250: Performed by: ANESTHESIOLOGY

## 2022-02-04 PROCEDURE — 99207 PR NO CHARGE LOS: CPT | Performed by: FAMILY MEDICINE

## 2022-02-04 PROCEDURE — 250N000009 HC RX 250

## 2022-02-04 PROCEDURE — 360N000076 HC SURGERY LEVEL 3, PER MIN: Performed by: OBSTETRICS & GYNECOLOGY

## 2022-02-04 PROCEDURE — 250N000011 HC RX IP 250 OP 636: Performed by: OBSTETRICS & GYNECOLOGY

## 2022-02-04 PROCEDURE — 272N000001 HC OR GENERAL SUPPLY STERILE: Performed by: OBSTETRICS & GYNECOLOGY

## 2022-02-04 PROCEDURE — 999N000249 HC STATISTIC C-SECTION ON UNIT

## 2022-02-04 PROCEDURE — 250N000011 HC RX IP 250 OP 636: Performed by: FAMILY MEDICINE

## 2022-02-04 PROCEDURE — 370N000017 HC ANESTHESIA TECHNICAL FEE, PER MIN: Performed by: OBSTETRICS & GYNECOLOGY

## 2022-02-04 PROCEDURE — C9803 HOPD COVID-19 SPEC COLLECT: HCPCS

## 2022-02-04 RX ORDER — METOCLOPRAMIDE 10 MG/1
10 TABLET ORAL EVERY 6 HOURS PRN
Status: DISCONTINUED | OUTPATIENT
Start: 2022-02-04 | End: 2022-02-04

## 2022-02-04 RX ORDER — NALOXONE HYDROCHLORIDE 0.4 MG/ML
0.4 INJECTION, SOLUTION INTRAMUSCULAR; INTRAVENOUS; SUBCUTANEOUS
Status: DISCONTINUED | OUTPATIENT
Start: 2022-02-04 | End: 2022-02-04 | Stop reason: HOSPADM

## 2022-02-04 RX ORDER — LIDOCAINE 40 MG/G
CREAM TOPICAL
Status: DISCONTINUED | OUTPATIENT
Start: 2022-02-04 | End: 2022-02-04 | Stop reason: HOSPADM

## 2022-02-04 RX ORDER — FENTANYL CITRATE 50 UG/ML
25 INJECTION, SOLUTION INTRAMUSCULAR; INTRAVENOUS EVERY 5 MIN PRN
Status: DISCONTINUED | OUTPATIENT
Start: 2022-02-04 | End: 2022-02-07 | Stop reason: HOSPADM

## 2022-02-04 RX ORDER — ONDANSETRON 4 MG/1
4 TABLET, ORALLY DISINTEGRATING ORAL EVERY 6 HOURS PRN
Status: DISCONTINUED | OUTPATIENT
Start: 2022-02-04 | End: 2022-02-04

## 2022-02-04 RX ORDER — NALBUPHINE HYDROCHLORIDE 10 MG/ML
2.5-5 INJECTION, SOLUTION INTRAMUSCULAR; INTRAVENOUS; SUBCUTANEOUS EVERY 6 HOURS PRN
Status: DISCONTINUED | OUTPATIENT
Start: 2022-02-04 | End: 2022-02-07 | Stop reason: HOSPADM

## 2022-02-04 RX ORDER — ONDANSETRON 2 MG/ML
INJECTION INTRAMUSCULAR; INTRAVENOUS PRN
Status: DISCONTINUED | OUTPATIENT
Start: 2022-02-04 | End: 2022-02-04

## 2022-02-04 RX ORDER — MISOPROSTOL 200 UG/1
400 TABLET ORAL
Status: DISCONTINUED | OUTPATIENT
Start: 2022-02-04 | End: 2022-02-04

## 2022-02-04 RX ORDER — ONDANSETRON 4 MG/1
4 TABLET, ORALLY DISINTEGRATING ORAL EVERY 30 MIN PRN
Status: DISCONTINUED | OUTPATIENT
Start: 2022-02-04 | End: 2022-02-07 | Stop reason: HOSPADM

## 2022-02-04 RX ORDER — NALOXONE HYDROCHLORIDE 0.4 MG/ML
0.4 INJECTION, SOLUTION INTRAMUSCULAR; INTRAVENOUS; SUBCUTANEOUS
Status: DISCONTINUED | OUTPATIENT
Start: 2022-02-04 | End: 2022-02-07 | Stop reason: HOSPADM

## 2022-02-04 RX ORDER — LIDOCAINE HYDROCHLORIDE 10 MG/ML
INJECTION, SOLUTION EPIDURAL; INFILTRATION; INTRACAUDAL; PERINEURAL PRN
Status: DISCONTINUED | OUTPATIENT
Start: 2022-02-04 | End: 2022-02-04

## 2022-02-04 RX ORDER — EPHEDRINE SULFATE 50 MG/ML
5 INJECTION, SOLUTION INTRAMUSCULAR; INTRAVENOUS; SUBCUTANEOUS
Status: DISCONTINUED | OUTPATIENT
Start: 2022-02-04 | End: 2022-02-04 | Stop reason: HOSPADM

## 2022-02-04 RX ORDER — OXYTOCIN/0.9 % SODIUM CHLORIDE 30/500 ML
100-340 PLASTIC BAG, INJECTION (ML) INTRAVENOUS CONTINUOUS PRN
Status: DISCONTINUED | OUTPATIENT
Start: 2022-02-04 | End: 2022-02-07 | Stop reason: HOSPADM

## 2022-02-04 RX ORDER — NALOXONE HYDROCHLORIDE 0.4 MG/ML
0.2 INJECTION, SOLUTION INTRAMUSCULAR; INTRAVENOUS; SUBCUTANEOUS
Status: DISCONTINUED | OUTPATIENT
Start: 2022-02-04 | End: 2022-02-04 | Stop reason: HOSPADM

## 2022-02-04 RX ORDER — LIDOCAINE HCL/EPINEPHRINE/PF 2%-1:200K
VIAL (ML) INJECTION PRN
Status: DISCONTINUED | OUTPATIENT
Start: 2022-02-04 | End: 2022-02-04

## 2022-02-04 RX ORDER — OXYTOCIN 10 [USP'U]/ML
10 INJECTION, SOLUTION INTRAMUSCULAR; INTRAVENOUS
Status: DISCONTINUED | OUTPATIENT
Start: 2022-02-04 | End: 2022-02-04 | Stop reason: HOSPADM

## 2022-02-04 RX ORDER — AMOXICILLIN 250 MG
1 CAPSULE ORAL 2 TIMES DAILY
Status: DISCONTINUED | OUTPATIENT
Start: 2022-02-04 | End: 2022-02-07 | Stop reason: HOSPADM

## 2022-02-04 RX ORDER — OXYTOCIN/0.9 % SODIUM CHLORIDE 30/500 ML
PLASTIC BAG, INJECTION (ML) INTRAVENOUS CONTINUOUS PRN
Status: DISCONTINUED | OUTPATIENT
Start: 2022-02-04 | End: 2022-02-04

## 2022-02-04 RX ORDER — PENICILLIN G POTASSIUM 5000000 [IU]/1
5 INJECTION, POWDER, FOR SOLUTION INTRAMUSCULAR; INTRAVENOUS ONCE
Status: COMPLETED | OUTPATIENT
Start: 2022-02-04 | End: 2022-02-04

## 2022-02-04 RX ORDER — MISOPROSTOL 200 UG/1
800 TABLET ORAL
Status: DISCONTINUED | OUTPATIENT
Start: 2022-02-04 | End: 2022-02-04

## 2022-02-04 RX ORDER — CEFAZOLIN SODIUM 1 G/3ML
1 INJECTION, POWDER, FOR SOLUTION INTRAMUSCULAR; INTRAVENOUS ONCE
Status: DISCONTINUED | OUTPATIENT
Start: 2022-02-04 | End: 2022-02-06

## 2022-02-04 RX ORDER — DIPHENHYDRAMINE HCL 25 MG
25 CAPSULE ORAL EVERY 6 HOURS PRN
Status: DISCONTINUED | OUTPATIENT
Start: 2022-02-04 | End: 2022-02-07 | Stop reason: HOSPADM

## 2022-02-04 RX ORDER — LIDOCAINE HYDROCHLORIDE AND EPINEPHRINE 15; 5 MG/ML; UG/ML
INJECTION, SOLUTION EPIDURAL PRN
Status: DISCONTINUED | OUTPATIENT
Start: 2022-02-04 | End: 2022-02-04

## 2022-02-04 RX ORDER — CITRIC ACID/SODIUM CITRATE 334-500MG
30 SOLUTION, ORAL ORAL
Status: COMPLETED | OUTPATIENT
Start: 2022-02-04 | End: 2022-02-04

## 2022-02-04 RX ORDER — NALOXONE HYDROCHLORIDE 0.4 MG/ML
0.2 INJECTION, SOLUTION INTRAMUSCULAR; INTRAVENOUS; SUBCUTANEOUS
Status: DISCONTINUED | OUTPATIENT
Start: 2022-02-04 | End: 2022-02-07 | Stop reason: HOSPADM

## 2022-02-04 RX ORDER — ONDANSETRON 2 MG/ML
4 INJECTION INTRAMUSCULAR; INTRAVENOUS EVERY 6 HOURS PRN
Status: DISCONTINUED | OUTPATIENT
Start: 2022-02-04 | End: 2022-02-04 | Stop reason: HOSPADM

## 2022-02-04 RX ORDER — HALOPERIDOL 5 MG/ML
1 INJECTION INTRAMUSCULAR
Status: DISCONTINUED | OUTPATIENT
Start: 2022-02-04 | End: 2022-02-07 | Stop reason: HOSPADM

## 2022-02-04 RX ORDER — LIDOCAINE 40 MG/G
CREAM TOPICAL
Status: DISCONTINUED | OUTPATIENT
Start: 2022-02-04 | End: 2022-02-04

## 2022-02-04 RX ORDER — MISOPROSTOL 200 UG/1
800 TABLET ORAL
Status: DISCONTINUED | OUTPATIENT
Start: 2022-02-04 | End: 2022-02-04 | Stop reason: HOSPADM

## 2022-02-04 RX ORDER — TERBUTALINE SULFATE 1 MG/ML
0.25 INJECTION, SOLUTION SUBCUTANEOUS ONCE
Status: COMPLETED | OUTPATIENT
Start: 2022-02-04 | End: 2022-02-04

## 2022-02-04 RX ORDER — MODIFIED LANOLIN
OINTMENT (GRAM) TOPICAL
Status: DISCONTINUED | OUTPATIENT
Start: 2022-02-04 | End: 2022-02-07 | Stop reason: HOSPADM

## 2022-02-04 RX ORDER — DIPHENHYDRAMINE HYDROCHLORIDE 50 MG/ML
25 INJECTION INTRAMUSCULAR; INTRAVENOUS EVERY 6 HOURS PRN
Status: DISCONTINUED | OUTPATIENT
Start: 2022-02-04 | End: 2022-02-07 | Stop reason: HOSPADM

## 2022-02-04 RX ORDER — OXYTOCIN 10 [USP'U]/ML
10 INJECTION, SOLUTION INTRAMUSCULAR; INTRAVENOUS
Status: DISCONTINUED | OUTPATIENT
Start: 2022-02-04 | End: 2022-02-07 | Stop reason: HOSPADM

## 2022-02-04 RX ORDER — IBUPROFEN 800 MG/1
800 TABLET, FILM COATED ORAL EVERY 6 HOURS
Status: DISCONTINUED | OUTPATIENT
Start: 2022-02-05 | End: 2022-02-05

## 2022-02-04 RX ORDER — SIMETHICONE 80 MG
80 TABLET,CHEWABLE ORAL 4 TIMES DAILY PRN
Status: DISCONTINUED | OUTPATIENT
Start: 2022-02-04 | End: 2022-02-07 | Stop reason: HOSPADM

## 2022-02-04 RX ORDER — MORPHINE SULFATE 1 MG/ML
1.5 INJECTION, SOLUTION EPIDURAL; INTRATHECAL; INTRAVENOUS ONCE
Status: COMPLETED | OUTPATIENT
Start: 2022-02-04 | End: 2022-02-04

## 2022-02-04 RX ORDER — HYDROCORTISONE 2.5 %
CREAM (GRAM) TOPICAL 3 TIMES DAILY PRN
Status: DISCONTINUED | OUTPATIENT
Start: 2022-02-04 | End: 2022-02-07 | Stop reason: HOSPADM

## 2022-02-04 RX ORDER — OXYTOCIN 10 [USP'U]/ML
10 INJECTION, SOLUTION INTRAMUSCULAR; INTRAVENOUS
Status: DISCONTINUED | OUTPATIENT
Start: 2022-02-04 | End: 2022-02-04

## 2022-02-04 RX ORDER — METHYLERGONOVINE MALEATE 0.2 MG/ML
200 INJECTION INTRAVENOUS
Status: DISCONTINUED | OUTPATIENT
Start: 2022-02-04 | End: 2022-02-04 | Stop reason: HOSPADM

## 2022-02-04 RX ORDER — AMOXICILLIN 250 MG
2 CAPSULE ORAL 2 TIMES DAILY
Status: DISCONTINUED | OUTPATIENT
Start: 2022-02-04 | End: 2022-02-07 | Stop reason: HOSPADM

## 2022-02-04 RX ORDER — METOCLOPRAMIDE HYDROCHLORIDE 5 MG/ML
10 INJECTION INTRAMUSCULAR; INTRAVENOUS EVERY 6 HOURS PRN
Status: DISCONTINUED | OUTPATIENT
Start: 2022-02-04 | End: 2022-02-04

## 2022-02-04 RX ORDER — MISOPROSTOL 200 UG/1
400 TABLET ORAL
Status: DISCONTINUED | OUTPATIENT
Start: 2022-02-04 | End: 2022-02-04 | Stop reason: HOSPADM

## 2022-02-04 RX ORDER — CARBOPROST TROMETHAMINE 250 UG/ML
250 INJECTION, SOLUTION INTRAMUSCULAR
Status: DISCONTINUED | OUTPATIENT
Start: 2022-02-04 | End: 2022-02-04

## 2022-02-04 RX ORDER — KETOROLAC TROMETHAMINE 30 MG/ML
30 INJECTION, SOLUTION INTRAMUSCULAR; INTRAVENOUS EVERY 6 HOURS
Status: DISPENSED | OUTPATIENT
Start: 2022-02-04 | End: 2022-02-05

## 2022-02-04 RX ORDER — PENICILLIN G 3000000 [IU]/50ML
3 INJECTION, SOLUTION INTRAVENOUS EVERY 4 HOURS
Status: DISCONTINUED | OUTPATIENT
Start: 2022-02-04 | End: 2022-02-04 | Stop reason: HOSPADM

## 2022-02-04 RX ORDER — BISACODYL 10 MG
10 SUPPOSITORY, RECTAL RECTAL DAILY PRN
Status: DISCONTINUED | OUTPATIENT
Start: 2022-02-06 | End: 2022-02-07 | Stop reason: HOSPADM

## 2022-02-04 RX ORDER — PROCHLORPERAZINE MALEATE 10 MG
10 TABLET ORAL EVERY 6 HOURS PRN
Status: DISCONTINUED | OUTPATIENT
Start: 2022-02-04 | End: 2022-02-04

## 2022-02-04 RX ORDER — PROCHLORPERAZINE 25 MG
25 SUPPOSITORY, RECTAL RECTAL EVERY 12 HOURS PRN
Status: DISCONTINUED | OUTPATIENT
Start: 2022-02-04 | End: 2022-02-04

## 2022-02-04 RX ORDER — OXYTOCIN/0.9 % SODIUM CHLORIDE 30/500 ML
340 PLASTIC BAG, INJECTION (ML) INTRAVENOUS CONTINUOUS PRN
Status: DISCONTINUED | OUTPATIENT
Start: 2022-02-04 | End: 2022-02-04 | Stop reason: HOSPADM

## 2022-02-04 RX ORDER — ACETAMINOPHEN 325 MG/1
975 TABLET ORAL ONCE
Status: COMPLETED | OUTPATIENT
Start: 2022-02-04 | End: 2022-02-04

## 2022-02-04 RX ORDER — ONDANSETRON 2 MG/ML
4 INJECTION INTRAMUSCULAR; INTRAVENOUS EVERY 6 HOURS PRN
Status: DISCONTINUED | OUTPATIENT
Start: 2022-02-04 | End: 2022-02-04

## 2022-02-04 RX ORDER — DEXTROSE, SODIUM CHLORIDE, SODIUM LACTATE, POTASSIUM CHLORIDE, AND CALCIUM CHLORIDE 5; .6; .31; .03; .02 G/100ML; G/100ML; G/100ML; G/100ML; G/100ML
INJECTION, SOLUTION INTRAVENOUS CONTINUOUS
Status: DISCONTINUED | OUTPATIENT
Start: 2022-02-04 | End: 2022-02-07 | Stop reason: HOSPADM

## 2022-02-04 RX ORDER — FENTANYL CITRATE 50 UG/ML
50-100 INJECTION, SOLUTION INTRAMUSCULAR; INTRAVENOUS
Status: DISCONTINUED | OUTPATIENT
Start: 2022-02-04 | End: 2022-02-04 | Stop reason: HOSPADM

## 2022-02-04 RX ORDER — DEXAMETHASONE SODIUM PHOSPHATE 4 MG/ML
INJECTION, SOLUTION INTRA-ARTICULAR; INTRALESIONAL; INTRAMUSCULAR; INTRAVENOUS; SOFT TISSUE PRN
Status: DISCONTINUED | OUTPATIENT
Start: 2022-02-04 | End: 2022-02-04

## 2022-02-04 RX ORDER — OXYCODONE HYDROCHLORIDE 5 MG/1
5 TABLET ORAL EVERY 4 HOURS PRN
Status: DISCONTINUED | OUTPATIENT
Start: 2022-02-04 | End: 2022-02-07 | Stop reason: HOSPADM

## 2022-02-04 RX ORDER — CEFAZOLIN SODIUM 2 G/100ML
2 INJECTION, SOLUTION INTRAVENOUS
Status: COMPLETED | OUTPATIENT
Start: 2022-02-04 | End: 2022-02-04

## 2022-02-04 RX ORDER — CARBOPROST TROMETHAMINE 250 UG/ML
250 INJECTION, SOLUTION INTRAMUSCULAR
Status: DISCONTINUED | OUTPATIENT
Start: 2022-02-04 | End: 2022-02-04 | Stop reason: HOSPADM

## 2022-02-04 RX ORDER — ACETAMINOPHEN 325 MG/1
975 TABLET ORAL EVERY 6 HOURS
Status: DISCONTINUED | OUTPATIENT
Start: 2022-02-04 | End: 2022-02-07 | Stop reason: HOSPADM

## 2022-02-04 RX ORDER — CEFAZOLIN SODIUM 2 G/100ML
2 INJECTION, SOLUTION INTRAVENOUS SEE ADMIN INSTRUCTIONS
Status: DISCONTINUED | OUTPATIENT
Start: 2022-02-04 | End: 2022-02-04 | Stop reason: HOSPADM

## 2022-02-04 RX ORDER — ONDANSETRON 4 MG/1
4 TABLET, ORALLY DISINTEGRATING ORAL EVERY 6 HOURS PRN
Status: DISCONTINUED | OUTPATIENT
Start: 2022-02-04 | End: 2022-02-04 | Stop reason: HOSPADM

## 2022-02-04 RX ORDER — ONDANSETRON 2 MG/ML
4 INJECTION INTRAMUSCULAR; INTRAVENOUS EVERY 30 MIN PRN
Status: DISCONTINUED | OUTPATIENT
Start: 2022-02-04 | End: 2022-02-07 | Stop reason: HOSPADM

## 2022-02-04 RX ORDER — OXYTOCIN/0.9 % SODIUM CHLORIDE 30/500 ML
340 PLASTIC BAG, INJECTION (ML) INTRAVENOUS CONTINUOUS PRN
Status: DISCONTINUED | OUTPATIENT
Start: 2022-02-04 | End: 2022-02-04

## 2022-02-04 RX ORDER — SODIUM CHLORIDE, SODIUM LACTATE, POTASSIUM CHLORIDE, CALCIUM CHLORIDE 600; 310; 30; 20 MG/100ML; MG/100ML; MG/100ML; MG/100ML
INJECTION, SOLUTION INTRAVENOUS CONTINUOUS
Status: DISCONTINUED | OUTPATIENT
Start: 2022-02-04 | End: 2022-02-07 | Stop reason: HOSPADM

## 2022-02-04 RX ORDER — SODIUM CHLORIDE, SODIUM LACTATE, POTASSIUM CHLORIDE, CALCIUM CHLORIDE 600; 310; 30; 20 MG/100ML; MG/100ML; MG/100ML; MG/100ML
INJECTION, SOLUTION INTRAVENOUS CONTINUOUS
Status: DISCONTINUED | OUTPATIENT
Start: 2022-02-04 | End: 2022-02-04 | Stop reason: HOSPADM

## 2022-02-04 RX ORDER — METHYLERGONOVINE MALEATE 0.2 MG/ML
200 INJECTION INTRAVENOUS
Status: DISCONTINUED | OUTPATIENT
Start: 2022-02-04 | End: 2022-02-04

## 2022-02-04 RX ADMIN — Medication 100 ML/HR: at 09:28

## 2022-02-04 RX ADMIN — LIDOCAINE HYDROCHLORIDE,EPINEPHRINE BITARTRATE 3 ML: 20; .005 INJECTION, SOLUTION EPIDURAL; INFILTRATION; INTRACAUDAL; PERINEURAL at 05:59

## 2022-02-04 RX ADMIN — LIDOCAINE HYDROCHLORIDE,EPINEPHRINE BITARTRATE 2 ML: 15; .005 INJECTION, SOLUTION EPIDURAL; INFILTRATION; INTRACAUDAL; PERINEURAL at 03:25

## 2022-02-04 RX ADMIN — PENICILLIN G POTASSIUM 5 MILLION UNITS: 5000000 POWDER, FOR SOLUTION INTRAMUSCULAR; INTRAPLEURAL; INTRATHECAL; INTRAVENOUS at 00:34

## 2022-02-04 RX ADMIN — KETOROLAC TROMETHAMINE 30 MG: 30 INJECTION, SOLUTION INTRAMUSCULAR at 13:01

## 2022-02-04 RX ADMIN — SODIUM CHLORIDE, POTASSIUM CHLORIDE, SODIUM LACTATE AND CALCIUM CHLORIDE 1000 ML: 600; 310; 30; 20 INJECTION, SOLUTION INTRAVENOUS at 00:34

## 2022-02-04 RX ADMIN — PENICILLIN G 3 MILLION UNITS: 3000000 INJECTION, SOLUTION INTRAVENOUS at 05:17

## 2022-02-04 RX ADMIN — Medication 5 MG: at 04:06

## 2022-02-04 RX ADMIN — ACETAMINOPHEN 975 MG: 325 TABLET, FILM COATED ORAL at 05:56

## 2022-02-04 RX ADMIN — SODIUM CHLORIDE, POTASSIUM CHLORIDE, SODIUM LACTATE AND CALCIUM CHLORIDE 1000 ML: 600; 310; 30; 20 INJECTION, SOLUTION INTRAVENOUS at 02:59

## 2022-02-04 RX ADMIN — LIDOCAINE HYDROCHLORIDE,EPINEPHRINE BITARTRATE 4 ML: 20; .005 INJECTION, SOLUTION EPIDURAL; INFILTRATION; INTRACAUDAL; PERINEURAL at 06:10

## 2022-02-04 RX ADMIN — SODIUM CHLORIDE, POTASSIUM CHLORIDE, SODIUM LACTATE AND CALCIUM CHLORIDE: 600; 310; 30; 20 INJECTION, SOLUTION INTRAVENOUS at 06:14

## 2022-02-04 RX ADMIN — IBUPROFEN 600 MG: 600 TABLET ORAL at 19:10

## 2022-02-04 RX ADMIN — FENTANYL CITRATE 10 ML/HR: 0.05 INJECTION, SOLUTION INTRAMUSCULAR; INTRAVENOUS at 03:25

## 2022-02-04 RX ADMIN — Medication 600 ML/HR: at 06:31

## 2022-02-04 RX ADMIN — SODIUM CITRATE AND CITRIC ACID MONOHYDRATE 30 ML: 500; 334 SOLUTION ORAL at 05:58

## 2022-02-04 RX ADMIN — DEXAMETHASONE SODIUM PHOSPHATE 10 MG: 4 INJECTION, SOLUTION INTRA-ARTICULAR; INTRALESIONAL; INTRAMUSCULAR; INTRAVENOUS; SOFT TISSUE at 06:32

## 2022-02-04 RX ADMIN — CEFAZOLIN SODIUM 2 G: 2 INJECTION, SOLUTION INTRAVENOUS at 06:25

## 2022-02-04 RX ADMIN — ACETAMINOPHEN 975 MG: 325 TABLET, FILM COATED ORAL at 13:01

## 2022-02-04 RX ADMIN — TERBUTALINE SULFATE 0.25 MG: 1 INJECTION SUBCUTANEOUS at 05:18

## 2022-02-04 RX ADMIN — LIDOCAINE HYDROCHLORIDE,EPINEPHRINE BITARTRATE 5 ML: 20; .005 INJECTION, SOLUTION EPIDURAL; INFILTRATION; INTRACAUDAL; PERINEURAL at 06:24

## 2022-02-04 RX ADMIN — LIDOCAINE HYDROCHLORIDE,EPINEPHRINE BITARTRATE 2 ML: 20; .005 INJECTION, SOLUTION EPIDURAL; INFILTRATION; INTRACAUDAL; PERINEURAL at 06:17

## 2022-02-04 RX ADMIN — ONDANSETRON 4 MG: 2 INJECTION INTRAMUSCULAR; INTRAVENOUS at 06:32

## 2022-02-04 RX ADMIN — PHENYLEPHRINE HYDROCHLORIDE 0.5 MCG/KG/MIN: 10 INJECTION INTRAVENOUS at 06:23

## 2022-02-04 RX ADMIN — LIDOCAINE HYDROCHLORIDE 3 ML: 10 INJECTION, SOLUTION EPIDURAL; INFILTRATION; INTRACAUDAL; PERINEURAL at 03:25

## 2022-02-04 RX ADMIN — ACETAMINOPHEN 975 MG: 325 TABLET, FILM COATED ORAL at 19:03

## 2022-02-04 RX ADMIN — LIDOCAINE HYDROCHLORIDE,EPINEPHRINE BITARTRATE 3 ML: 20; .005 INJECTION, SOLUTION EPIDURAL; INFILTRATION; INTRACAUDAL; PERINEURAL at 05:57

## 2022-02-04 RX ADMIN — Medication 2 MG: at 06:39

## 2022-02-04 RX ADMIN — KETOROLAC TROMETHAMINE 30 MG: 30 INJECTION, SOLUTION INTRAMUSCULAR at 06:59

## 2022-02-04 NOTE — PROGRESS NOTES
Having some stacked contractions with minimal variability to will proceed with one time dose of terbutaline.      Adolfo Angel MD

## 2022-02-04 NOTE — PROGRESS NOTES
OB Triage Note        Assessment and Plan:     Tigre Ceballos is a 21 year old  at 36w2d presenting with ruptured membranes   Patient Active Problem List   Diagnosis     Short cervix during pregnancy in second trimester     Supervision of high risk pregnancy, antepartum     Late prenatal care affecting pregnancy in second trimester     H/O cervical cerclage, currently pregnant, third trimester     At risk for diabetes mellitus     High-risk first pregnancy of young woman, unspecified trimester     Cervical cerclage suture present      Admit - see IP orders  Patient is a 22yo female at 36w1d gestation with prenatal history significant for cervical insufficiency with recent cerclage removal with [facility] on . Given that patient is likely ruptured, will confirm with ROM+ test and admit patient for  labor management.      Patient discussed with attending physician, Dr. Adolfo Angel , who agrees with the plan.      Nicholas Marte MD PGY1 2/3/2022  [facility]       Subjective:     Tigre Ceballos is a  21 year old female at 36w2d with a current prenatal history significant for cervical insufficiency who presents to OB triage with labor management.    Tigre Ceballos is a patient of Nicholas Grimaldo from [facility].     She reports regular contractions starting at 10am this morning, and occurring every 10 min minutes. She reports fluid leakage. She denies bleeding per vagina. Fetal movement is .normal.  Estimated Date of Delivery: Mar 1, 2022 Patient's last menstrual period was 2021 (approximate).       Her prenatal course has been complicated by  Cervical insufficiency .    Prenatal labs:   Lab Results   Component Value Date    AS Negative 10/25/2021    HEPBANG Nonreactive 10/25/2021    CHPCRT Negative 10/27/2021    GCPCRT Negative 10/25/2021    HGB 11.1 (L) 2021          Review of Systems:   CONSTITUTIONAL: no fatigue, no unexpected change in  "weight  SKIN: no worrisome rashes or lesions  EYES: no acute vision problems or changes  ENT: no ear problems, no mouth problems, no throat problems  RESP: no significant cough, no shortness of breath  CV: no chest pain, no palpitations, no new or worsening peripheral edema  GI: no nausea, no vomiting, no constipation, no diarrhea  : no frequency, no dysuria, no hematuria  NEURO: no weakness, no dizziness, no headaches  ENDOCRINE: no temperature intolerance, no skin/hair changes  PSYCHIATRIC: NEGATIVE for changes in mood or trouble with sleep         Physical Exam:   Vitals:   LMP 06/04/2021 (Approximate)   0 lbs 0 oz  Estimated body mass index is 30.01 kg/m  as calculated from the following:    Height as of 8/4/21: 1.6 m (5' 3\").    Weight as of 1/18/22: 76.8 kg (169 lb 6.4 oz).    GEN: Awake, alert in no apparent distress   HEENT: grossly normal  NECK: no lymphadenopathy or thryoidomegaly  RESPIRATORY: clear to auscultation bilaterally, no increased work of breathing  BACK:  no costovertebral angle tenderness   CARDIOVASCULAR: RRR, no murmur  ABDOMEN: gravid, 36 cm.  vertex by Leopold's  PELVIC:  no fluid noted, no blood noted.  Presenting part: verext by leopold's.  EXT:  no edema or calf tenderness    NST interpretation:  Baseline rate 140s normal  Accelerations present  Decelerations not present  Interpretation: reactive    Labs today:  No results found for any visits on 02/03/22.    "

## 2022-02-04 NOTE — PROGRESS NOTES
Labor Progress Note    Assessment/Plan  21 year old  at 36w3d gestational age admitted in PROM.   After patient received epidural at approx 0330, fetal heart tones dropped to low 60s x 4 min, in-house OB on standby. After IV bolus, repositioning, FTHs recovered to baseline. Throughout, patient was comfortable and in no distress.    - Continue to monitor FHR  - Anticipate       Subjective  Patient comfortable.     Objective  Vital signs in last 24 hours  Temp:  [97.9  F (36.6  C)-98.7  F (37.1  C)] 97.9  F (36.6  C)  Resp:  [16-18] 18  BP: (119)/(73) 119/73      Physical Exam  General: no acute distress  Abd: Gravid, soft, nontender  Cervix: 6cm, 70-90%% effaced, +2 station  FHR: Baseline 150s/mod variability/present accels/absent decels; Category 1 tracing  [place]: Contractions Q 2-3 min  Extremities: no peripheral edema    Pertinent Labs   Lab Results   Component Value Date    ABORH B POS 2022    HGB 11.1 2021            Patient discussed with attending physician, Dr. Adolfo Angel , who agrees with the plan.     Nicholas Marte MD PGY1 2022  [facility]

## 2022-02-04 NOTE — ANESTHESIA CARE TRANSFER NOTE
Patient: Tigre ORTEGA Lin    Procedure: Procedure(s):   SECTION       Diagnosis: Other (see Comments)  Diagnosis Additional Information: No value filed.    Anesthesia Type:   Epidural     Note:    Oropharynx: oropharynx clear of all foreign objects and spontaneously breathing  Level of Consciousness: awake  Oxygen Supplementation: room air    Independent Airway: airway patency satisfactory and stable  Dentition: dentition unchanged  Vital Signs Stable: post-procedure vital signs reviewed and stable  Report to RN Given: handoff report given  Patient transferred to: Labor and Delivery    Handoff Report: Identifed the Patient, Identified the Reponsible Provider, Reviewed the pertinent medical history, Discussed the surgical course, Reviewed Intra-OP anesthesia mangement and issues during anesthesia, Set expectations for post-procedure period and Allowed opportunity for questions and acknowledgement of understanding      Vitals:  Vitals Value Taken Time   /42 22 0723   Temp 36.7  C (98.1  F) 22 07   Pulse 109 22 0723   Resp 20 22 07   SpO2 98 % 22       Electronically Signed By: ABEL Crews CRNA  2022  7:25 AM

## 2022-02-04 NOTE — ANESTHESIA PREPROCEDURE EVALUATION
Anesthesia Pre-Procedure Evaluation    Patient: Tigre ORTEGA Lin   MRN: [id number] : 2000        Preoperative Diagnosis: * No pre-op diagnosis entered *    Procedure : * No procedures listed *          Past Medical History:   Diagnosis Date     Cervical incompetence       Past Surgical History:   Procedure Laterality Date     CERCLAGE CERVICAL N/A 10/25/2021    Procedure: CERCLAGE, CERVIX, VAGINAL APPROACH;  Surgeon: Patrick Gallegos MD;  Location: [facility] L+D      No Known Allergies   Social History     Tobacco Use     Smoking status: Never Smoker     Smokeless tobacco: Never Used   Substance Use Topics     Alcohol use: Never      Wt Readings from Last 1 Encounters:   22 77.1 kg (170 lb)        Anesthesia Evaluation            ROS/MED HX  ENT/Pulmonary:  - neg pulmonary ROS     Neurologic:  - neg neurologic ROS     Cardiovascular:  - neg cardiovascular ROS     METS/Exercise Tolerance:     Hematologic:  - neg hematologic  ROS     Musculoskeletal:       GI/Hepatic:  - neg GI/hepatic ROS     Renal/Genitourinary:       Endo:     (+) Obesity,     Psychiatric/Substance Use:  - neg psychiatric ROS     Infectious Disease:       Malignancy:       Other:            Physical Exam    Airway        Mallampati: II   TM distance: > 3 FB   Neck ROM: full   Mouth opening: > 3 cm    Respiratory Devices and Support         Dental  no notable dental history         Cardiovascular   cardiovascular exam normal          Pulmonary   pulmonary exam normal                OUTSIDE LABS:  CBC:   Lab Results   Component Value Date    WBC 8.2 10/25/2021    WBC 8.3 10/25/2021    HGB 11.1 (L) 2021    HGB 11.2 (L) 10/25/2021    HCT 32.6 (L) 10/25/2021    HCT 33.9 (L) 10/25/2021     10/25/2021     10/25/2021     BMP: No results found for: NA, POTASSIUM, CHLORIDE, CO2, BUN, CR, GLC  COAGS: No results found for: PTT, INR, FIBR  POC:   Lab Results   Component Value Date    HCG Positive (A) 10/20/2021     HEPATIC: No results  found for: ALBUMIN, PROTTOTAL, ALT, AST, GGT, ALKPHOS, BILITOTAL, BILIDIRECT, MAXI  OTHER:   Lab Results   Component Value Date    A1C 4.8 10/25/2021       Anesthesia Plan    ASA Status:  2                    Consents    Anesthesia Plan(s) and associated risks, benefits, and realistic alternatives discussed. Questions answered and patient/representative(s) expressed understanding.    - Discussed:     - Discussed with:  Patient         Postoperative Care            Comments:    Other Comments: Patient requests labor analgesia. Chart reviewed, including labs. I discussed the options and risks with thepatient, including but not limited to: bleeding, infection, tissue injury to nerve, muscle or blood vessel, hypotension, headache and epidural failure. All patient questions answered and the consent signed. Patient agrees to elective epidural placement.     0600: Patient to C/S for NRFHT and arrest of labor. Epidural functioning well. Will bolus and use duramorph for POA.       neg OB ROS.       Ori Tomlin MD

## 2022-02-04 NOTE — ANESTHESIA PROCEDURE NOTES
Epidural catheter Procedure Note    Pre-Procedure   Staff -        Anesthesiologist:  Ori Tomlin MD       Performed By: anesthesiologist       Location: OB       Procedure Start/Stop Times: 2/4/2022 3:09 AM and 2/4/2022 3:26 AM       Pre-Anesthestic Checklist: patient identified, IV checked, risks and benefits discussed, informed consent, monitors and equipment checked, pre-op evaluation and post-op pain management  Timeout:       Correct Patient: Yes        Correct Procedure: Yes        Correct Site: Yes        Correct Position: Yes   Procedure Documentation  Procedure: epidural catheter       Patient Position: sitting       Patient Prep/Sterile Barriers: sterile gloves, mask, patient draped       Skin prep: Chloraprep       Local skin infiltrated with 2 mL of 1% lidocaine.        Insertion Site: L3-4. (midline approach).       Technique: LORT air        Needle Type: Kadoink       Needle Gauge: 18.        Needle Length (Inches): 3.5        Catheter: 20 G.         Catheter threaded easily.         Threaded 9 cm at skin.         # of attempts: 1 and  # of redirects:  0    Assessment/Narrative         Paresthesias: No.       Test dose of 3 mL lidocaine 1.5% w/ 1:200,000 epinephrine at 03:21 CST.         Test dose negative, 3 minutes after injection, for signs of intravascular, subdural, or intrathecal injection.       Insertion/Infusion Method: LORT air       Aspiration negative for Heme or CSF via Epidural Catheter.

## 2022-02-04 NOTE — ANESTHESIA POSTPROCEDURE EVALUATION
Patient: Tigre ORTEGA Lin    Procedure: Procedure(s):   SECTION       Diagnosis:Other (see Comments)  Diagnosis Additional Information: No value filed.    Anesthesia Type:  Epidural    Note:  Disposition: Inpatient   Postop Pain Control: Uneventful            Sign Out: Well controlled pain   PONV: No   Neuro/Psych: Uneventful            Sign Out: Acceptable/Baseline neuro status   Airway/Respiratory: Uneventful            Sign Out: Acceptable/Baseline resp. status   CV/Hemodynamics: Uneventful            Sign Out: Acceptable CV status; No obvious hypovolemia; No obvious fluid overload   Other NRE: NONE   DID A NON-ROUTINE EVENT OCCUR? No     Epidural-to- Updated ASA: 3 Emergent      Last vitals:  Vitals Value Taken Time   /54 22 1232   Temp 37  C (98.6  F) 22 1232   Pulse 116 22 0730   Resp 16 22 1232   SpO2 95 % 22 0745       Electronically Signed By: Lexis Tello MD  2022  4:21 PM

## 2022-02-04 NOTE — CONSULTS
OB NOTE    22 [place] [facility] [place]  Had decreased variability, one episode of prolonged bradycardia  Responded to intrauterine resusitation but now with subtle late decels  Cervix unchanged over several hours at 6 cm  Would recommend CS  Discussed with patient and , given informed consent    Gerry Bergeron MD

## 2022-02-04 NOTE — PROGRESS NOTES
OBSTETRICS ADMISSION HISTORY & PHYSICAL  DATE OF ADMISSION: 2/3/2022 10:31 PM        Assessment and Plan:   Assessment:   Tigre Ceballos is a 21 year old  at 36w2d Membranes are ruptured.   Patient Active Problem List   Diagnosis     Short cervix during pregnancy in second trimester     Supervision of high risk pregnancy, antepartum     Late prenatal care affecting pregnancy in second trimester     H/O cervical cerclage, currently pregnant, third trimester     At risk for diabetes mellitus     High-risk first pregnancy of young woman, unspecified trimester     Cervical cerclage suture present     Encounter for triage in pregnant patient         PLAN:   1. Admit - see IP orders  2. GBS: neg as of 10/25/21 1613. Re-swab  3. Minimize cervical checks as ruptured primigravida poss increased risk of infection   4. Comfort plan: natural for now, open to epidural if pain intensified  5. Will monitor labor progress along with RN and update attending physician  5.   Will notify Nicholas Malin and partner Dr. Adolfo Angel.    Patient discussed with attending physician, Dr. Adolfo Angel , who agrees with the plan.     Nicholas Marte MD PGY1 2/3/2022  [facility] Family [facility] Residency [facility]         Chief Complaint:     Fluid leakage       History of Present Illness:     Tigre Ceballos is a 21 year old year old  at 36w2d confirmed by 20w US. Patient received prenatal care with Nicholas Grimaldo at [facility] clinic.    Presents to the [facility] for PROM.    They report regular contractions starting at 10am, and occurring every 10 minutes. Theyreports fluid leakage. They denies bleeding per vagina. Fetal movement is .normal.    Their prenatal course has been complicated by  Cervical insufficiency .    Prenatal labs   Lab Results   Component Value Date    AS Negative 10/25/2021    HEPBANG Nonreactive 10/25/2021    CHPCRT Negative 10/27/2021    GCPCRT Negative 10/25/2021    HGB 11.1 (L) 2021       GBS  was collected on 2/3/2022.              Obstetrical History:     OB History    Para Term  AB Living   1 0 0 0 0 0   SAB IAB Ectopic Multiple Live Births   0 0 0 0 0      # Outcome Date GA Lbr Hemanth/2nd Weight Sex Delivery Anes PTL Lv   1 Current                       Immunzations:     Most Recent Immunizations   Administered Date(s) Administered     COVID-19,PF,Moderna 2021     COVID-19,PF,Moderna Booster 2021     HEPA 2013     HPV 2016     HPV9 2019     HepB 2013     Influenza (IIV3) PF 10/22/2019     Influenza Vaccine IM > 6 months Valent IIV4 (Alfuria,Fluzone) 10/27/2021     Influenza Vaccine, 6+MO IM (QUADRIVALENT W/PRESERVATIVES) 10/29/2013     MMR 2012     Mantoux Tuberculin Skin Test 2018     Meningococcal (Menactra ) 2016     Meningococcal (Menomune ) 2012     Poliovirus, inactivated (IPV) 2013     TD (ADULT, 7+) 2013     Tdap (Adacel,Boostrix) 2013     Varicella 2013     Tdap this pregnancy?  YES   Flu shot this pregnancy?YES - Date: 10/27/2021  COVID vaccine? YES - Date:2021, 2020, 2021         Past Medical History:     Past Medical History:   Diagnosis Date     Cervical incompetence             Past Surgical History:     Past Surgical History:   Procedure Laterality Date     CERCLAGE CERVICAL N/A 10/25/2021    Procedure: CERCLAGE, CERVIX, VAGINAL APPROACH;  Surgeon: Patrick Gallegos MD;  Location:  L+D            Family History:     Family History   Problem Relation Age of Onset     Hypertension Mother      Diabetes Mother      Cancer Father      Heart Disease No family hx of             Social History:   no tobacco use  no alcohol use  no illicit drug use         Medications:   No current facility-administered medications on file prior to encounter.  acetaminophen (TYLENOL) 325 MG tablet, Take 2 tablets (650 mg) by mouth every 6 hours as needed for mild pain  diphenhydrAMINE (BENADRYL) 25  "MG capsule, Take 2 capsules (50 mg) by mouth every 6 hours as needed for itching or allergies (Patient not taking: Reported on 1/18/2022)  EPINEPHrine (ANY BX GENERIC EQUIV) 0.3 MG/0.3ML injection 2-pack, Inject 0.3 mLs (0.3 mg) into the muscle once as needed for anaphylaxis (Patient not taking: Reported on 1/18/2022)             Allergies:   Patient has no known allergies.         Review of Systems:   CONSTITUTIONAL: no fatigue, no unexpected change in weight  SKIN: no worrisome rashes or lesions  EYES: no acute vision problems or changes  ENT: no ear problems, no mouth problems, no throat problems  RESP: no significant cough, no shortness of breath  CV: no chest pain, no palpitations, no new or worsening peripheral edema  GI: no nausea, no vomiting, no constipation, no diarrhea  : no frequency, no dysuria, no hematuria  NEURO: no weakness, no dizziness, no headaches  ENDOCRINE: no temperature intolerance, no skin/hair changes  PSYCHIATRIC: NEGATIVE for changes in mood or trouble with sleep         Physical Exam:   Vitals:   Ht (P) 1.6 m (5' 3\")   Wt (P) 77.1 kg (170 lb)   LMP 06/04/2021 (Approximate)   BMI (P) 30.11 kg/m    0 lbs 0 oz  Estimated body mass index is 30.11 kg/m  (pended) as calculated from the following:    Height as of this encounter: (P) 1.6 m (5' 3\").    Weight as of this encounter: (P) 77.1 kg (170 lb).    GEN: Awake, alert in no apparent distress   HEENT: grossly normal  NECK: no lymphadenopathy or thryoidomegaly  RESPIRATORY: clear to auscultation bilaterally, no increased work of breathing  BACK:  no costovertebral angle tenderness   CARDIOVASCULAR: RRR, no murmur  ABDOMEN: gravid, 36 cm.  vertex by Leopold's  PELVIC:  no fluid noted, no blood noted.  Presenting part: vertex by leopold's.  EXT:  no edema or calf tenderness  Confirmed VTX by Ultrasound? yes    Electronic Fetal Monitoring:  Baseline rate normal  Variability moderate  Accelerations present  Decelerations not present "     Assessment: Category I EFM interpretation suggests absence of concern for fetal metabolic acidemia at this time due to moderate variability and accelerations present    Uterine Activity normal.      Strip reviewed at bedside    NST interpretation:  Baseline rate 140s normal  Accelerations present  Decelerations not present  Interpretation: reactive

## 2022-02-04 NOTE — PROGRESS NOTES
Labor Progress Note    Assessment/Plan  21 year old  at 36w3d gestational age admitted in labor.     - Continue to monitor FHR  -pain management: epidural ordered  - Anticipate        Subjective  Patient reports intense contractions. Requests epidural      Objective  Vital signs in last 24 hours  Temp:  [98.1  F (36.7  C)-98.7  F (37.1  C)] 98.1  F (36.7  C)  Resp:  [16-18] 18  BP: (119)/(73) 119/73      Physical Exam  General:   Abd: Gravid, soft, nontender  Cervix: 4cm, 80% effaced, +1 station  FHR: Baseline 140s/mod variability/+ accels/absent decels; Category 1 tracing  [place]: Contractions Q 3 min  Extremities: no peripheral edema    Pertinent Labs   Lab Results   Component Value Date    ABORH B POS 2022    HGB 11.1 2021            Patient discussed with attending physician, Dr. Adolfo Angel , who agrees with the plan.     Nicholas Marte MD PGY1 2022  [facility] Family Medicine Residency Program

## 2022-02-04 NOTE — OP NOTE
PROCEDURE NOTE:      NAME:  Tigre ORTEGA Lin   RECORD # [id number]   ADMIT DATE: 2/3/2022    DATE OF SERVICE: 2022     PREOPERATIVE DIAGNOSIS: non-reassuring fetal status    PROCEDURE: Low transverse  section      SURGEON:  Gerry Bergeron MD     ASSISTANT: OR staff    ANESTHESIA: spinal    Delivery QBL (mL): 409     DRAINS: [facility] catheter.    COMPLICATIONS: None    FINDINGS: Normal uterus, tubes and ovaries bilateral. Normal appearance to the adnexae.  Live male infant born, Apgars of 9   at 1 minute, 9  at 5 minutes,  Weight (oz):  96  oz.    CONSENT: Patient was met preoperatively where we discussed the procedure and the risks associated with the procedure.  She understood these to include but not limited to injury to adjacent organs including bowel, bladder, ureter, infection and bleeding. Understanding these risks her consents were signed.      PROCEDURE: Patient was brought to the operating room in stable condition.  After induction of a spinal anesthetic, fetal heart tones were checked and were stable. She was prepped and draped in sterile fashion for the procedure.  A timeout was then performed.      A pfannensteil skin incision was made to the level of the fascia.  The fascia was incised laterally. Kocher clamps were applied to the superior aspect of the incision which was sharply dissected from the rectus muscles.  The kocher clamps were then reapplied to the inferior aspect of the incision which was similarly sharply dissected from the rectus muscles.  The rectus muscles were  bluntly in the midline.  The peritoneum was entered sharply. This incision was then extended.  A bladder blade was introduced. The vesicouterine peritoneum was identified and a bladder flap was created.  A low transverse uterine incision was made and amniotic sac was ruptured revealing clear amniotic fluid.  The baby's head was then delivered.There was not a nuchal cord noted. There was a spontaneous  cry and therefore bulb suction was performed. The remainder of the infant was easily delivered. The cord was clamped x 2 and cut and the infant handed off to waiting nursing personnel.    The placenta was then manually removed from the uterus.  The uterus was exteriorized, covered with a moist laparotomy sponge and cleared of all clots and debris.  The uterine incision was closed with 0 chromic from both angles in a running locking suture and  A second imbricating suture of 0 was used for hemostasis. The uterus was returned to the abdominopelvic cavity.  The pericolic gutters were cleared of all clots and debris.  The uterine incision was again inspected and noted to be hemostatic.  The peritoneum was closed with suture superiorly to inferiorly.  The rectus muscles were made hemostatic with the use of electrocautery and brought together with figure-of-8 sutures of suture, the fascia was brought together with PDS loop from both angles in a running nonlocking suture, met at the midline, the subcutaneous tissues were irrigated, made hemostatic with use of electrocautery and brought together with 3-0 plain.  Skin was closed with staples.  Patient tolerated this procedure well.  Sponge, lap and needle counts were correct x two.    Gerry Bergeron MD        CC: Nicholas Marte Kevin Alexander Hallman, MD

## 2022-02-05 LAB — HGB BLD-MCNC: 9.5 G/DL (ref 11.7–15.7)

## 2022-02-05 PROCEDURE — 120N000001 HC R&B MED SURG/OB

## 2022-02-05 PROCEDURE — 85018 HEMOGLOBIN: CPT | Performed by: OBSTETRICS & GYNECOLOGY

## 2022-02-05 PROCEDURE — 250N000013 HC RX MED GY IP 250 OP 250 PS 637

## 2022-02-05 PROCEDURE — 250N000013 HC RX MED GY IP 250 OP 250 PS 637: Performed by: OBSTETRICS & GYNECOLOGY

## 2022-02-05 PROCEDURE — 36415 COLL VENOUS BLD VENIPUNCTURE: CPT | Performed by: OBSTETRICS & GYNECOLOGY

## 2022-02-05 RX ORDER — MAGNESIUM HYDROXIDE/ALUMINUM HYDROXICE/SIMETHICONE 120; 1200; 1200 MG/30ML; MG/30ML; MG/30ML
30 SUSPENSION ORAL EVERY 6 HOURS PRN
Status: DISCONTINUED | OUTPATIENT
Start: 2022-02-05 | End: 2022-02-07 | Stop reason: HOSPADM

## 2022-02-05 RX ORDER — IBUPROFEN 800 MG/1
800 TABLET, FILM COATED ORAL EVERY 6 HOURS
Status: DISCONTINUED | OUTPATIENT
Start: 2022-02-05 | End: 2022-02-07 | Stop reason: HOSPADM

## 2022-02-05 RX ORDER — CALCIUM CARBONATE 500 MG/1
500 TABLET, CHEWABLE ORAL 2 TIMES DAILY PRN
Status: DISCONTINUED | OUTPATIENT
Start: 2022-02-05 | End: 2022-02-07 | Stop reason: HOSPADM

## 2022-02-05 RX ADMIN — SIMETHICONE 80 MG: 80 TABLET, CHEWABLE ORAL at 17:31

## 2022-02-05 RX ADMIN — ALUMINUM HYDROXIDE, MAGNESIUM HYDROXIDE, AND SIMETHICONE 30 ML: 200; 200; 20 SUSPENSION ORAL at 22:17

## 2022-02-05 RX ADMIN — IBUPROFEN 800 MG: 800 TABLET ORAL at 08:46

## 2022-02-05 RX ADMIN — ACETAMINOPHEN 975 MG: 325 TABLET, FILM COATED ORAL at 08:45

## 2022-02-05 RX ADMIN — IBUPROFEN 800 MG: 800 TABLET ORAL at 20:41

## 2022-02-05 RX ADMIN — Medication: at 17:37

## 2022-02-05 RX ADMIN — ACETAMINOPHEN 975 MG: 325 TABLET, FILM COATED ORAL at 14:25

## 2022-02-05 RX ADMIN — ACETAMINOPHEN 975 MG: 325 TABLET, FILM COATED ORAL at 20:42

## 2022-02-05 RX ADMIN — ACETAMINOPHEN 975 MG: 325 TABLET, FILM COATED ORAL at 01:19

## 2022-02-05 RX ADMIN — SENNOSIDES AND DOCUSATE SODIUM 1 TABLET: 50; 8.6 TABLET ORAL at 08:46

## 2022-02-05 RX ADMIN — IBUPROFEN 800 MG: 800 TABLET ORAL at 14:25

## 2022-02-05 NOTE — PLAN OF CARE
Problem: Adult Inpatient Plan of Care  Goal: Plan of Care Review  Outcome: Improving  Goal: Patient-Specific Goal (Individualized)  Outcome: Improving  Goal: Absence of Hospital-Acquired Illness or Injury  Outcome: Improving  Intervention: Prevent Skin Injury  Recent Flowsheet Documentation  Taken 2/5/2022 1600 by Christal Fowler RN  Body Position: position changed independently  Taken 2/5/2022 0800 by Christal Fowler RN  Body Position: position changed independently  Intervention: Prevent and Manage VTE (Venous Thromboembolism) Risk  Recent Flowsheet Documentation  Taken 2/5/2022 1600 by Christal Fowler RN  VTE Prevention/Management: fluids promoted  Taken 2/5/2022 0800 by Christal Fowler RN  VTE Prevention/Management: fluids promoted  Intervention: Prevent Infection  Recent Flowsheet Documentation  Taken 2/5/2022 1600 by Christal Fowler RN  Infection Prevention:   rest/sleep promoted   single patient room provided   visitors restricted/screened   equipment surfaces disinfected   environmental surveillance performed   hand hygiene promoted  Taken 2/5/2022 0800 by Christal Fowler RN  Infection Prevention:   rest/sleep promoted   single patient room provided   visitors restricted/screened   equipment surfaces disinfected   environmental surveillance performed   hand hygiene promoted  Goal: Optimal Comfort and Wellbeing  Outcome: Improving  Intervention: Provide Person-Centered Care  Recent Flowsheet Documentation  Taken 2/5/2022 1600 by Christal Fowler RN  Trust Relationship/Rapport:   care explained   choices provided   emotional support provided   questions answered   questions encouraged   thoughts/feelings acknowledged   reassurance provided   empathic listening provided  Taken 2/5/2022 0800 by Christal Fowler RN  Trust Relationship/Rapport:   care explained   choices provided   emotional support provided   questions answered   questions encouraged    thoughts/feelings acknowledged   reassurance provided   empathic listening provided  Goal: Readiness for Transition of Care  Outcome: Improving     Problem: Bleeding (Labor)  Goal: Hemostasis  Outcome: Improving  Intervention: Monitor for Presence of Bleeding  Recent Flowsheet Documentation  Taken 2/5/2022 1600 by Christal Fowler RN  Stabilization Measures: legs elevated  Taken 2/5/2022 0800 by Christal Fowler RN  Stabilization Measures: legs elevated  Intervention: Manage Bleeding  Recent Flowsheet Documentation  Taken 2/5/2022 1600 by Christal Fowler RN  Stabilization Measures: legs elevated  Taken 2/5/2022 0800 by Christal Fowler RN  Stabilization Measures: legs elevated     Problem: Change in Fetal Wellbeing (Labor)  Goal: Stable Fetal Wellbeing  Outcome: Improving  Intervention: Promote and Monitor Fetal Wellbeing  Recent Flowsheet Documentation  Taken 2/5/2022 1600 by Christal Fowler RN  Body Position: position changed independently  Taken 2/5/2022 0800 by Christal Fowler RN  Body Position: position changed independently     Problem: Delayed Labor Progression (Labor)  Goal: Effective Progression to Delivery  Outcome: Improving     Problem: Infection (Labor)  Goal: Absence of Infection Signs and Symptoms  Outcome: Improving  Intervention: Prevent or Manage Infection  Recent Flowsheet Documentation  Taken 2/5/2022 1600 by Christal Fowler RN  Infection Prevention:   rest/sleep promoted   single patient room provided   visitors restricted/screened   equipment surfaces disinfected   environmental surveillance performed   hand hygiene promoted  Infection Management: aseptic technique maintained  Taken 2/5/2022 0800 by Christal Fowler RN  Infection Prevention:   rest/sleep promoted   single patient room provided   visitors restricted/screened   equipment surfaces disinfected   environmental surveillance performed   hand hygiene promoted  Infection Management: aseptic  technique maintained     Problem: Labor Pain (Labor)  Goal: Acceptable Pain Control  Outcome: Improving  Intervention: Support Labor Pain Coping and Management  Recent Flowsheet Documentation  Taken 2/5/2022 1600 by Christal Fowler RN  Sensory Stimulation Regulation:   music on   lighting decreased   care clustered   visual stimulation minimized   quiet environment promoted  Complementary Therapy: essential oils utilized  Taken 2/5/2022 0800 by Christal Fowler RN  Sensory Stimulation Regulation:   music on   lighting decreased   care clustered   visual stimulation minimized   quiet environment promoted  Complementary Therapy: essential oils utilized     Problem: Uterine Tachysystole (Labor)  Goal: Normal Uterine Contraction Pattern  Outcome: Improving  Intervention: Monitor and Manage Uterine Activity  Recent Flowsheet Documentation  Taken 2/5/2022 1600 by Christal Fowler RN  Fluid/Electrolyte Management: fluids provided  Stabilization Measures: legs elevated  Taken 2/5/2022 0800 by Christal Fowler RN  Fluid/Electrolyte Management: fluids provided  Stabilization Measures: legs elevated    Stable day.  Pt's vss.  Pt is up ambulating and voiding independently without difficulty.  Pt is independent with self cares.  Pt is taking scheduled Ibuprofen and Tylenol every 6 hours for occasional incisional discomfort with + relief per pt.      Pt is pumping and bottle feeding NB with gd technique.  Pt is independent with pumping and feedings.  Pt declines any discomfort with pumping.  NB is bottle feeding well in side lying position and a soft slow flow nipple.  NB is bottle feeding ad apple amts (15-25cc) of DBM/EBM.  No emesis after and NB is content/sleeps well between feedings.     Both pt and FOB are very loving, caring, and attentive towards NB.  Pt is independent, comfortable, and confident with NB's cares and feedings.      Pt is comfortable at this time and declines further needs at this time.      Christal Fowler, RN  2/5/2022

## 2022-02-05 NOTE — PLAN OF CARE
Problem: Adult Inpatient Plan of Care  Goal: Optimal Comfort and Wellbeing  Outcome: Improving         Patient is able to ambulate independently. Patient has been able to empty bladder completely since she her straight cath. Patient rating pain at 0.

## 2022-02-05 NOTE — PROGRESS NOTES
"POSTPARTUM DAY #1/     Subjective:  The patient feels tired but happy.  Has been up to void, managing pain.    Objective   The patient has a blood pressure which is within the normal range. Urinary output is adequate.     Exam:   /57 (BP Location: Right arm)   Pulse 93   Temp 97.7  F (36.5  C) (Oral)   Resp 16   Ht 1.6 m (5' 3\")   Wt 77.1 kg (170 lb)   LMP 2021 (Approximate)   SpO2 97%   Breastfeeding Unknown   BMI 30.11 kg/m    General: NAD, alert and orientated   Abdomen: soft, NT   Fundus: firm, nontender  Incision: covered, C/D/I- small area of blood marked  Ext: no pain 1+ ankle edema bilaterally     LAB: pre-op 13.3  Lab Results   Component Value Date    HGB 9.5 (L) 2022         I/Os    Intake/Output Summary (Last 24 hours) at 2022 1024  Last data filed at 2022 2332  Gross per 24 hour   Intake 240 ml   Output 1543 ml   Net -1303 ml         Impression:   Normal postpartum course, POD#1 LTCS secondary to non-reassuring fetal status        POSTOPERATIVE     - switch to oral pain meds  - encourage ambulation   - Continue current care  -possible home tomorrow.    Funmi Mccallum MD FACOG  [facility]  [phone]    "

## 2022-02-05 NOTE — LACTATION NOTE
This note was copied from a baby's chart.  Referred to  to assist with a feeding but baby had just had a feeding. Tigre was then given pump pieces to pump again when it was noted she needed smaller pieces. At this point, she was given 21mm flanges for pumping that she would also need for home. Also, a pumping top was made for her so that she could massage her breasts while pumping to stimulate a supply. A discussion was had in regard to number of feeds and pumping sessions that would be needed to create a full milk supply.  was dispensed a Medela pump for home. Oupt resources for lactation were reviewed.

## 2022-02-05 NOTE — PROGRESS NOTES
Pt was unable to void after attempting at 1530 and 1730. Straight cath'd pt for 600 ml jhony urine.    Called Dr. Montenegro due to pt's saturated mepilex (>50%). Dr. Montenegro said okay to take saturated mepilex off and assess if incision is still oozing. If so, pressure dressing is in order. Dr. Montenegro to assess incision shortly.    Suzanne Hernandez RN

## 2022-02-06 PROCEDURE — 120N000001 HC R&B MED SURG/OB

## 2022-02-06 PROCEDURE — 250N000013 HC RX MED GY IP 250 OP 250 PS 637

## 2022-02-06 PROCEDURE — 250N000013 HC RX MED GY IP 250 OP 250 PS 637: Performed by: OBSTETRICS & GYNECOLOGY

## 2022-02-06 RX ADMIN — SIMETHICONE 80 MG: 80 TABLET, CHEWABLE ORAL at 00:31

## 2022-02-06 RX ADMIN — ACETAMINOPHEN 975 MG: 325 TABLET, FILM COATED ORAL at 08:43

## 2022-02-06 RX ADMIN — IBUPROFEN 800 MG: 800 TABLET ORAL at 21:08

## 2022-02-06 RX ADMIN — ACETAMINOPHEN 975 MG: 325 TABLET, FILM COATED ORAL at 02:49

## 2022-02-06 RX ADMIN — IBUPROFEN 800 MG: 800 TABLET ORAL at 13:59

## 2022-02-06 RX ADMIN — SIMETHICONE 80 MG: 80 TABLET, CHEWABLE ORAL at 13:59

## 2022-02-06 RX ADMIN — SIMETHICONE 80 MG: 80 TABLET, CHEWABLE ORAL at 08:44

## 2022-02-06 RX ADMIN — IBUPROFEN 800 MG: 800 TABLET ORAL at 02:50

## 2022-02-06 RX ADMIN — SENNOSIDES AND DOCUSATE SODIUM 1 TABLET: 50; 8.6 TABLET ORAL at 21:09

## 2022-02-06 RX ADMIN — SENNOSIDES AND DOCUSATE SODIUM 2 TABLET: 50; 8.6 TABLET ORAL at 08:43

## 2022-02-06 RX ADMIN — IBUPROFEN 800 MG: 800 TABLET ORAL at 08:43

## 2022-02-06 RX ADMIN — ACETAMINOPHEN 975 MG: 325 TABLET, FILM COATED ORAL at 21:07

## 2022-02-06 RX ADMIN — ACETAMINOPHEN 975 MG: 325 TABLET, FILM COATED ORAL at 13:59

## 2022-02-06 NOTE — PROGRESS NOTES
"POSTPARTUM DAY # 2/       Subjective:  The patient feels well. The patient has no emotional concerns. Pain is well controlled with current medications. Patient has transitioned to oral pain medications. The patient is ambulating well. She is voiding after catheter removal and is starting to pass gas.The amount and color of the lochia is appropriate for the duration of recovery, patient denies clots. The baby is under bili lights.    Objective   The patient has a blood pressure which is within the normal range. Urinary output is adequate.     Exam:   /65 (BP Location: Right arm)   Pulse 111   Temp 97.7  F (36.5  C) (Oral)   Resp 20   Ht 1.6 m (5' 3\")   Wt 77.1 kg (170 lb)   LMP 2021 (Approximate)   SpO2 97%   Breastfeeding Unknown   BMI 30.11 kg/m    General: NAD  Abdomen: soft, NT  Fundus: firm, nontender  Incision: C/D/I  Ext: no pain       Impression:   Normal postpartum course. POD#2, LTCS secondary to non-reassuring fetal status      Plan:  [facility]     POSTOPERATIVE   - Continue current care.  - Doing well  - Likely home tomorrow if stable       Gerry Bergeron MD     "

## 2022-02-07 VITALS
OXYGEN SATURATION: 98 % | SYSTOLIC BLOOD PRESSURE: 127 MMHG | HEIGHT: 63 IN | BODY MASS INDEX: 30.12 KG/M2 | HEART RATE: 117 BPM | WEIGHT: 170 LBS | TEMPERATURE: 98.2 F | RESPIRATION RATE: 17 BRPM | DIASTOLIC BLOOD PRESSURE: 84 MMHG

## 2022-02-07 LAB
ATRIAL RATE - MUSE: 110 BPM
DIASTOLIC BLOOD PRESSURE - MUSE: NORMAL MMHG
ERYTHROCYTE [DISTWIDTH] IN BLOOD BY AUTOMATED COUNT: 13.5 % (ref 10–15)
HCT VFR BLD AUTO: 31.3 % (ref 35–47)
HGB BLD-MCNC: 10.1 G/DL (ref 11.7–15.7)
INTERPRETATION ECG - MUSE: NORMAL
MCH RBC QN AUTO: 29.4 PG (ref 26.5–33)
MCHC RBC AUTO-ENTMCNC: 32.3 G/DL (ref 31.5–36.5)
MCV RBC AUTO: 91 FL (ref 78–100)
P AXIS - MUSE: 41 DEGREES
PLATELET # BLD AUTO: 215 10E3/UL (ref 150–450)
PR INTERVAL - MUSE: 114 MS
QRS DURATION - MUSE: 70 MS
QT - MUSE: 310 MS
QTC - MUSE: 419 MS
R AXIS - MUSE: 60 DEGREES
RBC # BLD AUTO: 3.44 10E6/UL (ref 3.8–5.2)
SYSTOLIC BLOOD PRESSURE - MUSE: NORMAL MMHG
T AXIS - MUSE: 6 DEGREES
VENTRICULAR RATE- MUSE: 110 BPM
WBC # BLD AUTO: 11.4 10E3/UL (ref 4–11)

## 2022-02-07 PROCEDURE — 250N000013 HC RX MED GY IP 250 OP 250 PS 637

## 2022-02-07 PROCEDURE — 250N000013 HC RX MED GY IP 250 OP 250 PS 637: Performed by: OBSTETRICS & GYNECOLOGY

## 2022-02-07 PROCEDURE — 93010 ELECTROCARDIOGRAM REPORT: CPT | Performed by: INTERNAL MEDICINE

## 2022-02-07 PROCEDURE — 36415 COLL VENOUS BLD VENIPUNCTURE: CPT

## 2022-02-07 PROCEDURE — 999N000054 HC STATISTIC EKG NON-CHARGEABLE

## 2022-02-07 PROCEDURE — 93005 ELECTROCARDIOGRAM TRACING: CPT

## 2022-02-07 PROCEDURE — 85027 COMPLETE CBC AUTOMATED: CPT

## 2022-02-07 RX ORDER — OXYCODONE HYDROCHLORIDE 5 MG/1
5 TABLET ORAL EVERY 6 HOURS PRN
Qty: 12 TABLET | Refills: 0 | Status: SHIPPED | OUTPATIENT
Start: 2022-02-07 | End: 2022-02-10

## 2022-02-07 RX ADMIN — SENNOSIDES AND DOCUSATE SODIUM 1 TABLET: 50; 8.6 TABLET ORAL at 08:58

## 2022-02-07 RX ADMIN — IBUPROFEN 800 MG: 800 TABLET ORAL at 03:23

## 2022-02-07 RX ADMIN — ACETAMINOPHEN 975 MG: 325 TABLET, FILM COATED ORAL at 15:07

## 2022-02-07 RX ADMIN — IBUPROFEN 800 MG: 800 TABLET ORAL at 08:59

## 2022-02-07 RX ADMIN — ACETAMINOPHEN 975 MG: 325 TABLET, FILM COATED ORAL at 08:59

## 2022-02-07 RX ADMIN — IBUPROFEN 800 MG: 800 TABLET ORAL at 15:07

## 2022-02-07 RX ADMIN — ACETAMINOPHEN 975 MG: 325 TABLET, FILM COATED ORAL at 03:24

## 2022-02-07 NOTE — PLAN OF CARE
Problem: Adult Inpatient Plan of Care  Goal: Plan of Care Review  Outcome: Adequate for Discharge  Goal: Patient-Specific Goal (Individualized)  Outcome: Adequate for Discharge  Goal: Absence of Hospital-Acquired Illness or Injury  Outcome: Adequate for Discharge  Goal: Optimal Comfort and Wellbeing  Outcome: Adequate for Discharge  Intervention: Provide Person-Centered Care  Recent Flowsheet Documentation  Taken 2/7/2022 1513 by Ana Mejia RN  Trust Relationship/Rapport:   questions encouraged   questions answered  Goal: Readiness for Transition of Care  Outcome: Adequate for Discharge     Problem: Bleeding (Labor)  Goal: Hemostasis  Outcome: Adequate for Discharge     Problem: Change in Fetal Wellbeing (Labor)  Goal: Stable Fetal Wellbeing  Outcome: Adequate for Discharge     Problem: Delayed Labor Progression (Labor)  Goal: Effective Progression to Delivery  Outcome: Adequate for Discharge     Problem: Infection (Labor)  Goal: Absence of Infection Signs and Symptoms  Outcome: Adequate for Discharge     Problem: Labor Pain (Labor)  Goal: Acceptable Pain Control  Outcome: Adequate for Discharge     Problem: Uterine Tachysystole (Labor)  Goal: Normal Uterine Contraction Pattern  Outcome: Adequate for Discharge

## 2022-02-07 NOTE — PROGRESS NOTES
"Outreach Note for [facility]    Chart reviewed, discharge plan discussed with patient, needs assessed. Patient verbalizes understanding of plan, requests [facility] visit as ordered, [facility] nurse visit planned for , Home Care Intake updated. Patient and , \"Bryn\", phone number is reported as correct in EMR.    Patient states she has good support at home, has baby care essentials, and feels ready to discharge today. Outreach RN will continue to follow and assist if needed with discharge plan. No additional needs identified at this time.        "

## 2022-02-07 NOTE — PLAN OF CARE
Problem: Adult Inpatient Plan of Care  Goal: Plan of Care Review  Outcome: Improving  Goal: Patient-Specific Goal (Individualized)  Outcome: Improving  Goal: Absence of Hospital-Acquired Illness or Injury  Outcome: Improving  Intervention: Prevent Skin Injury  Recent Flowsheet Documentation  Taken 2/6/2022 1700 by Christal Fowler RN  Body Position: position changed independently  Taken 2/6/2022 0800 by Christal Fowler RN  Body Position: position changed independently  Intervention: Prevent and Manage VTE (Venous Thromboembolism) Risk  Recent Flowsheet Documentation  Taken 2/6/2022 1700 by Christal Fowler RN  VTE Prevention/Management: fluids promoted  Taken 2/6/2022 0800 by Christal Fowler RN  VTE Prevention/Management: fluids promoted  Intervention: Prevent Infection  Recent Flowsheet Documentation  Taken 2/6/2022 1700 by Christal Fowler RN  Infection Prevention:   rest/sleep promoted   single patient room provided   visitors restricted/screened   equipment surfaces disinfected   environmental surveillance performed   hand hygiene promoted  Taken 2/6/2022 0800 by Christal Fowler RN  Infection Prevention:   rest/sleep promoted   single patient room provided   visitors restricted/screened   equipment surfaces disinfected   environmental surveillance performed   hand hygiene promoted  Goal: Optimal Comfort and Wellbeing  Outcome: Improving  Intervention: Provide Person-Centered Care  Recent Flowsheet Documentation  Taken 2/6/2022 1700 by Christal Fowler RN  Trust Relationship/Rapport:   care explained   choices provided   emotional support provided   questions answered   questions encouraged   thoughts/feelings acknowledged   reassurance provided   empathic listening provided  Taken 2/6/2022 0800 by Christal Fowler RN  Trust Relationship/Rapport:   care explained   choices provided   emotional support provided   questions answered   questions encouraged    thoughts/feelings acknowledged   reassurance provided   empathic listening provided  Goal: Readiness for Transition of Care  Outcome: Improving     Problem: Bleeding (Labor)  Goal: Hemostasis  Outcome: Improving  Intervention: Monitor for Presence of Bleeding  Recent Flowsheet Documentation  Taken 2/6/2022 1700 by Christal Fowler RN  Stabilization Measures: legs elevated  Taken 2/6/2022 0800 by Christal Fowler RN  Stabilization Measures: legs elevated  Intervention: Manage Bleeding  Recent Flowsheet Documentation  Taken 2/6/2022 1700 by Christal Fowler RN  Stabilization Measures: legs elevated  Taken 2/6/2022 0800 by Christal Fowler RN  Stabilization Measures: legs elevated     Problem: Change in Fetal Wellbeing (Labor)  Goal: Stable Fetal Wellbeing  Outcome: Improving  Intervention: Promote and Monitor Fetal Wellbeing  Recent Flowsheet Documentation  Taken 2/6/2022 1700 by Christal Fowler RN  Body Position: position changed independently  Taken 2/6/2022 0800 by Christal Fowler RN  Body Position: position changed independently     Problem: Delayed Labor Progression (Labor)  Goal: Effective Progression to Delivery  Outcome: Improving     Problem: Infection (Labor)  Goal: Absence of Infection Signs and Symptoms  Outcome: Improving  Intervention: Prevent or Manage Infection  Recent Flowsheet Documentation  Taken 2/6/2022 1700 by Christal Fowler RN  Infection Prevention:   rest/sleep promoted   single patient room provided   visitors restricted/screened   equipment surfaces disinfected   environmental surveillance performed   hand hygiene promoted  Infection Management: aseptic technique maintained  Taken 2/6/2022 0800 by Christal Fowler RN  Infection Prevention:   rest/sleep promoted   single patient room provided   visitors restricted/screened   equipment surfaces disinfected   environmental surveillance performed   hand hygiene promoted  Infection Management: aseptic  technique maintained     Problem: Labor Pain (Labor)  Goal: Acceptable Pain Control  Outcome: Improving  Intervention: Support Labor Pain Coping and Management  Recent Flowsheet Documentation  Taken 2/6/2022 1700 by Christal Fowler RN  Sensory Stimulation Regulation:   music on   lighting decreased   care clustered   visual stimulation minimized   quiet environment promoted  Complementary Therapy: essential oils utilized  Taken 2/6/2022 0800 by Christal Fowler RN  Sensory Stimulation Regulation:   music on   lighting decreased   care clustered   visual stimulation minimized   quiet environment promoted  Complementary Therapy: essential oils utilized     Problem: Uterine Tachysystole (Labor)  Goal: Normal Uterine Contraction Pattern  Outcome: Improving  Intervention: Monitor and Manage Uterine Activity  Recent Flowsheet Documentation  Taken 2/6/2022 1700 by Christal Fowler RN  Fluid/Electrolyte Management: fluids provided  Stabilization Measures: legs elevated  Taken 2/6/2022 0800 by Christal Fowler RN  Fluid/Electrolyte Management: fluids provided  Stabilization Measures: legs elevated    Stable day.  Pt's vss.  Pt is up ambulating and voiding independently without difficulty.  Pt is independent with self cares.  Pt is taking scheduled Ibuprofen and Tylenol every 6 hours for occasional incisional discomfort with + relief per pt.       Pt is pumping and bottle feeding NB with gd technique.  Pt is independent with pumping and feedings.  Pt declines any discomfort with pumping.  NB is bottle feeding well in side lying position and a soft slow flow nipple.  NB is bottle feeding ad apple amts (25-40cc) of DBM/EBM.  No emesis after and NB is content/sleeps well between feedings.      Both pt and FOB are very loving, caring, and attentive towards NB.  Pt is independent, comfortable, and confident with NB's cares and feedings.       Pt is comfortable at this time and declines further needs at this  time.      Christal Fowler RN  2/6/2022

## 2022-02-07 NOTE — PLAN OF CARE
Problem: Adult Inpatient Plan of Care  Goal: Optimal Comfort and Wellbeing  Outcome: Improving     Problem: Adult Inpatient Plan of Care  Goal: Patient-Specific Goal (Individualized)  Outcome: Improving   Patient had elevated pulse during shift. Resident aware. Will continue to monitor.

## 2022-02-07 NOTE — LACTATION NOTE
This note was copied from a baby's chart.  F/u done with Eh in regard to feedings. Baby had been under phototherapy and a warmer for lower temps. At time of feeding, he was placed at the breast using a NS 20mm. Eh's breasts were firm to the touch, but expressing was not as easy as thought. Baby would latch at the breast but did not suck enough to pull milk through. He was enticed with EBM in a bottle, but still did not nurse successfully. Eventually he got sleepy and pace feeding with the bottle was done by  in order to offer him volume.Eh encouraged to continue to pump after feeding attempts for now.

## 2022-02-07 NOTE — PROGRESS NOTES
Brief Progress note  Notified by RN that patient's HR was showing 150's on monitor. Patient also reporting some lightheadedness and dyspnea when walking to bathroom. Reporting no ileana bleeding. Does state that she did have some clots this morning. Patient POD#2  with normal post operative course.    Heart: RRR  Lungs: CTAB  Abdomen: Incision clean, dry, and intact.     Last Hgb 9.5 on AM of . Pre-op 11.4.     CBC revealed Hgb 10.1.   EKG showed Sinus tachycardia.    Patient vitals stable, denying any symptoms now of lightheadedness or dyspnea.   Believe tachycardia likely secondary to some anxiety. Will continue to monitor for any acute clinical change.  Likely home later today.

## 2022-02-07 NOTE — PROVIDER NOTIFICATION
James Kim, Resident notified of pulse by machine reading 220-230 briefly (about 5-10 sec) before returning to 120-130. Patient states feeling heart race. Patient states having to catch breathe after being up to the bathroom. Resident states coming to assess patient.

## 2022-02-11 NOTE — DISCHARGE SUMMARY
HOSPITAL DISCHARGE SUMMARY -  Birth    Patient Name: Tigre ORTEGA Lin   YOB: 2000  Age: 21 year old  Medical Record Number: [id number]  Primary Physician: Nicholas Marte    Admission Date:  2/3/2022  Delivery Date:   2/3/2022  Gestational Age at Delivery:  36w3d   Discharge Date:  2/10/2022    REASON FOR ADMISSION: Labor and Delivery    DIAGNOSIS:    1.  Birth secondary to fetal intolerance to labor  2. APGARS at 1 min 8, at 5 min 9      Conditions complicating antepartum/postpartum:  Other Complications/Significant Findings:  None    PROCEDURES:  Lower transverse     SIGNIFICANT DIAGNOSTIC PROCEDURES:   None    CONSULTS: None    HISTORY OF PRESENT ILLNESS AND HOSPITAL COURSE: This is a 21 year old   female who underwent  section without complication secondary to fetal intolerance to labor . Postoperative course was unremarkable. On the day of discharge patient was tolerating diet, pain was controlled with oral medications, she was voiding and passing gas.    LABS:  Lab Results   Component Value Date    HGB 10.1 (L) 2022     Pre-operative hgb : 12      PENDING LABS:  None    DISPOSITION:  Home    DISCHARGE CONDITION: Good/Stable    DISCHARGE MEDICATIONS:      Review of your medicines      START taking      Dose / Directions   oxyCODONE 5 MG tablet  Commonly known as: ROXICODONE  Used for: Supervision of high risk pregnancy, antepartum      Dose: 5 mg  Take 1 tablet (5 mg) by mouth every 6 hours as needed for pain  Quantity: 12 tablet  Refills: 0        CONTINUE these medicines which have NOT CHANGED      Dose / Directions   acetaminophen 325 MG tablet  Commonly known as: TYLENOL  Used for: Cervical cerclage suture present in second trimester      Dose: 650 mg  Take 2 tablets (650 mg) by mouth every 6 hours as needed for mild pain  Quantity: 60 tablet  Refills: 0     diphenhydrAMINE 25 MG capsule  Commonly known as: BENADRYL      Dose: 50 mg  Take 2 capsules (50  mg) by mouth every 6 hours as needed for itching or allergies  Quantity: 30 capsule  Refills: 0     EPINEPHrine 0.3 MG/0.3ML injection 2-pack  Commonly known as: ANY BX GENERIC EQUIV      Dose: 0.3 mg  Inject 0.3 mLs (0.3 mg) into the muscle once as needed for anaphylaxis  Quantity: 0.6 mL  Refills: 0     prenatal multivitamin w/iron 27-0.8 MG tablet  Used for: Encounter for confirmation of pregnancy test result with physical examination      Dose: 1 tablet  Take 1 tablet by mouth daily  Quantity: 90 tablet  Refills: 3           Where to get your medicines      Some of these will need a paper prescription and others can be bought over the counter. Ask your nurse if you have questions.    Bring a paper prescription for each of these medications    oxyCODONE 5 MG tablet         DISCHARGE PLAN:   - Follow up with  DR Elyssa SMITH, in 10 days  - Take medication as prescribed  - Physical activity: As tolerated, no heavy lifting. Pelvic rest.  - Diet:  Regular  - Medication:  Please see MAR  - Warning signs discussed with patient about when to call the clinic/hospital  - All questions and concerns were answered for the patient prior to discharge.         Gerry Bergeron MD on 2/10/2022 at 11:57 PM      I saw the patient on the date of discharge  Total time spent for discharge on date of discharge: 10 minutes    Physician(s) in addition to primary physician who should receive a copy:  CC1: Gerry Bergeron MD  Fellow, Female Pelvic Medicine and Reconstructive Surgery

## 2022-02-23 ENCOUNTER — OFFICE VISIT (OUTPATIENT)
Dept: FAMILY MEDICINE | Facility: CLINIC | Age: 22
End: 2022-02-23
Payer: COMMERCIAL

## 2022-02-23 ENCOUNTER — TELEPHONE (OUTPATIENT)
Dept: FAMILY MEDICINE | Facility: CLINIC | Age: 22
End: 2022-02-23

## 2022-02-23 VITALS
SYSTOLIC BLOOD PRESSURE: 112 MMHG | RESPIRATION RATE: 16 BRPM | BODY MASS INDEX: 26.96 KG/M2 | TEMPERATURE: 97.8 F | WEIGHT: 152.2 LBS | DIASTOLIC BLOOD PRESSURE: 79 MMHG | HEART RATE: 94 BPM | OXYGEN SATURATION: 97 %

## 2022-02-23 PROCEDURE — 99213 OFFICE O/P EST LOW 20 MIN: CPT | Mod: GC

## 2022-02-23 NOTE — PROGRESS NOTES
Preceptor Attestation:    I discussed the patient with the resident and evaluated the patient in person. I have verified the content of the note, which accurately reflects my assessment of the patient and the plan of care.   Supervising Physician:  Osiel Drake DO.

## 2022-02-23 NOTE — PATIENT INSTRUCTIONS
Patient Education     Infant Sleep  What are a baby's sleep needs?  Sleep needs for babies vary depending on their age. Newborns do sleep much of the time. But their sleep is in very short segments. As a baby grows, the total amount of sleep slowly decreases. But the length of nighttime sleep increases.  Generally, babies don't have regular sleep cycles until they are about 6 months old. Newborns sleep about 16 to 17 hours per day. But they may not sleep more than 1 to 2 hours at a time. Most babies don't start sleeping through the night (6 to 8 hours) without waking until they are about 3 months old.  Babies also have different sleep cycles than adults. Babies spend much less time in rapid eye movement (REM) sleep (which is dream time sleep). And the cycles are shorter. The following are the usual sleep needs per 24 hours for newborns through 2 years old:    Newborns to first few months: 16 to 17 hours    4 to 12 months: 12 to 16 hours    1 to 2 years: 11 to 14 hours  What are the symptoms of sleep problems in a baby?  Once a baby begins to regularly sleep through the night, parents are often unhappy when the baby starts to wake up at night again. This often happens at about 6 months old. This is often a normal part of development called separation anxiety. This is when a baby does not understand that separations are short-term (temporary). Babies may also start to have trouble going to sleep because of separation anxiety. Or because they are overstimulated or overtired.  Common responses of babies having these night awakenings or trouble going to sleep may include the following:    Waking and crying one or more times in the night after sleeping through the night    Crying when you leave the room    Refusing to go to sleep without a parent nearby    Clinging to the parent at separation  Sleep problems may also happen with illness. Talk with your baby's healthcare provider if your baby begins having trouble going to  sleep or staying asleep, especially if this is a new pattern.  Signs of sleep readiness  You can help your baby sleep by recognizing signs of sleep readiness, teaching him or her to fall asleep on his own, and comforting him or her with awakenings. Your baby may show signs of being ready for sleep by:    Rubbing eyes    Yawning    Looking away    Fussing  Helping your baby fall asleep  Babies may not be able to create their own sleeping and waking patterns. Surprisingly, not all babies know how to put themselves to sleep. And not all babies can go back to sleep if they are awakened in the night. When it's time for bed, many parents want to rock or breastfeed a baby to help him or her fall asleep. Creating a bedtime routine is a good idea. But don't let your baby fall asleep in your arms. This may become a pattern. And your baby may begin to expect to be in your arms to fall asleep. When your baby briefly wakes up during a sleep cycle, they may not be able to go back to sleep on their own.  Babies who feel secure are better able to handle separations, especially at night. Cuddling and comforting your baby during the day can help him or her feel more secure. Other ways to help your baby learn to sleep include:    Allowing time for naps each day as needed for your baby's age.    Not having any stimulation or activity close to bedtime.    Creating a bedtime routine, such as bath, reading books, and rocking.    Playing soft music while your baby is getting sleepy.    Tucking your baby into bed when they are drowsy, but before going to sleep.    Comforting and reassuring your baby when they are afraid.    For night awakenings, comfort and reassure your baby by patting and soothing. Don't take your baby out of bed.    If your baby cries, wait a few minutes, then return and reassure with patting and soothing. Then say keo and leave. Repeat as needed.    Being consistent with the routine and your responses.  Reducing  "the risk for (SIDS) sudden infant death syndrome and other sleep-related infant deaths  Here are recommendations from the American Academy of Pediatrics (AAP) on how to reduce the risk for SIDS and sleep-related deaths from birth to 1 year old:    Know the ABCs of safe baby sleep:  ? A is for alone. Put baby to sleep alone in their crib. Keep soft items like toys, crib bumpers, and blankets out of the crib.  ? B is for back. Make sure to lay your baby down to sleep on their back. \"Back to sleep\" is safest to reduce risk of SIDS.  ? C is for crib. Babies should sleep on a firm surface such as a crib, bassinet, or portable crib that meets safety standards.       Have your baby immunized. An infant who is fully immunized may reduce his or her risk for SIDS.    Breastfeed your baby. The AAP recommends breastmilk only for at least 6 months.    Place your baby on their back for all sleep and naps until they are 1 year old. This can reduce the risk for SIDS, breathing in food or a foreign object (aspiration), and choking. Never place your baby on their side or stomach for sleep or naps. If your baby is awake, give your child time on their tummy as long as you are watching. This can reduce the chance that your child will develop a flat head.    Always talk with your baby's healthcare provider before raising the head of the crib if your baby has been diagnosed with gastroesophageal reflux.    Offer your baby a pacifier for sleeping or naps. If your baby is breastfeeding, don't use a pacifier until breastfeeding has been fully established.    Use a firm mattress that is covered by a tightly fitted sheet. This can prevent gaps between the mattress and the sides of a crib, a play yard, or a bassinet. That can reduce the risk of the baby getting stuck between the mattress and the sides (entrapment). It can also reduce the risk of suffocation and SIDS.    Share your room instead of your bed with your baby. Putting your baby in " bed with you raises the risk for strangulation, suffocation, entrapment, and SIDS. Bed sharing is not recommended for twins or other multiples. The AAP recommends that infants sleep in the same room as their parents, close to their parents' bed. But babies should be in a separate bed or crib appropriate for infants. This sleeping arrangement is recommended ideally for the baby's first year. But it should at least be maintained for the first 6 months.    Don't use infant seats, car seats, strollers, infant carriers, and infant swings for routine sleep and daily naps. These may lead to blockage of an infant's airway or suffocation.    Don't put infants on a couch or armchair for sleep. Sleeping on a couch or armchair puts the baby at a much higher risk of death, including SIDS.    Don't use illegal drugs and alcohol, and don't smoke during pregnancy or after birth. Keep your baby away from others who are smoking and places where others smoke.    Don't overbundle, overdress, or cover your baby's face or head. This will prevent them from getting overheated, reducing the risk for SIDS.    Don't use loose bedding or soft objects (bumper pads, pillows, comforters, blankets) in your baby's crib or bassinet. This can help prevent suffocation, strangulation, entrapment, or SIDS.    Don't use home cardiorespiratory monitors and commercial devices (wedges, positioners, and special mattresses) to help reduce the risk for SIDS and sleep-related infant deaths. These devices have never been shown to reduce the risk of SIDS. In rare cases, they have caused infant deaths.    Always place cribs, bassinets, and play yards in places with no dangling cords, wires, or window coverings. This can reduce the risk for strangulation.  [employer] last reviewed this educational content on 10/1/2020    6269-3480 [employer]. All rights reserved. This information is not intended as a substitute for professional medical care. Always  follow your healthcare professional's instructions.           Thank you for trusting us with your care.     Here's a summary of the visit today:   1. Please follow up with OB in 2-3 weeks   2. Return to clinic sooner if you experience symptoms          Thank you.     Dr. Marte

## 2022-02-23 NOTE — TELEPHONE ENCOUNTER
Called pt 3/2/22    ABOUT BABY:  Bryn Lin 02/04/2023  1) How is feeding going? Pumping and feeding breast milk/formula 2-3 ozs every 2-3 hrs.    2) Do you have the things you need to take care of your baby? Yes    3) Any change in urination, stooling, or skin color? +10 wet diapers and no stool today.  Discussed that this can be normal due to iron in the formula taking longer to digest.  Discussed the need to call if no BM in 4 days or if baby is having hard stools or irritable or hard belly etc.    4) Any other concerns you have about your baby? No BM today- see above.        ABOUT MOM:  Tigre Matthews Lin 2000  1) Any concerns about bleeding, stooling, urination, or abdominal pain? Vaginal bleeding has stopped. No issues with voiding/stooling, and abd pain is better.  States that the incision looks fine but there are a few stitches that are sticking out.  Discussed not pulling on them and the OBGYN will look at them when she has her appt on Tuesday, 3/15/22 at 11:45 AM with Dr rodas at [facility] location.     2) How is your mood and how are you coping?  Mood is ok/fine.    3) What is your plan for contraception? Depo Recommended a visit at 6 weeks to do postpartum visit and discuss contraception options with your physician.    Then we would be able to start, inject or place contraception at timing of 2month check for baby.  Remind mom that she MUST abstain from intercourse or use condoms until this visit so she would be eligible for contraception at time of 2 month visit for baby.      6 wk PP APPT: Wed, 3/23/22 at 10:00 AM with Dr Marte./LISA

## 2022-02-23 NOTE — PROGRESS NOTES
DATE:  2022  CHIEF COMPLAINT:    Chief Complaint   Patient presents with     RECHECK     Follow up c section            SUBJECTIVE       Eh JORDAN Ceballos is a  Female who is 21 year old years old with a PMHx significant for:     Patient Active Problem List   Diagnosis     Short cervix during pregnancy in second trimester     Supervision of high risk pregnancy, antepartum     Late prenatal care affecting pregnancy in second trimester     H/O cervical cerclage, currently pregnant, third trimester     At risk for diabetes mellitus     High-risk first pregnancy of young woman, unspecified trimester     Cervical cerclage suture present     Encounter for triage in pregnant patient     Encounter for vaginal delivery     confirmed and updated problem list    She is Here today for her 3 week post-partum visit. Delivery Date: 2022 via C/S    She had a  c/s for abnormal FHT of viable Baby boy, named Bryn who weighed 96oz  96 oz, with no complications.      Current Concerns: lack of sleep due to baby wakings, still experiencing constipation symptoms but taking stool softener.     Post Partum ROS:  1) How are your feeding your baby and do you have any concerns about this? pumped breastmilk by bottle    2)  Breast Feedin-12 times in 24 hours(every 2-4 hours) with night time awakenings for feeding every 2-4 hours.  If feedings are painful there may be problems with latching or completely emptying the one breast before switching to the next.  WIC or a lactation consultation can be helpful for these problems.  Do you have any red or painful areas of the breast, any fevers or chills:  NOThese may be symptoms of mastitis and you should call your clinic if these occur.    Will you be returning to work or leaving your baby while breast feeding? No     If yes: You will need a breast pump which is usually partially or totally covered by your health insurance and you can call the number on the back of the card for  information on specifics.  You will need to pump as often as your baby feeds to maintain your milk supply.  You will also need to purchase bottles and slow flow nipples for feedings while you are away.  The milk you pump will need to be stored in a cooler, fridge or freezer until you can bring it home and store it in the freezer until it is needed for baby.  Breastmilk should be stored in sterile bags or bottles until it is used.  Lactation consultants can help with any problems you are having pumping or breastfeeding.  [facility] is a great resource for any of these problems as well.       Formula Feeding: Formula feeds should be 2-3 ounces every 2-4 hours and awakenings at night for feedings can also be every 2-4 hours.  Nipples on the bottles should be a slow flow for newborns.  Never add more water than directed on the formula can and use the mixture within 1 hour and then discard the remaining mixture.    3) Post-partum Vaginal Bleeding: no    4) Vaginal Tissue: healing well, no complaints.    : scar healing well, abdominal strength returning and abdominal pain resolving. C/s scar healing well. Absorbable stitches still visible     5)Hemorrhoids: no    6) Returning to [place]:   Patient has not had intercourse since delivery and complains of No discomfort.  It is safe to do this 6 weeks after a vaginal delivery.  Your libido may be low due to the breastfeeding hormones (prolactin) or due to fatigue from taking care of baby and all of the touching and loving interactions you have with your baby.  This is normal and will fade with time.    7)Abdominal pain: no    8) Contraception: Exclusive breast feeding is a form of contraception but progesterone only and barrier methods of contraception are more reliable and safe during breastfeeding.  If not breastfeeding, any form of contraception is safe and recommended to space babies the recommended 18 months apart.   What form of contraception is desired? Depo  provera shot in 2-3 weeks    9) Sleep/stress/support/caring for baby: , mother,     10) Postpartum depression screening was negative.     An  was not used for this visit.         OBJECTIVE     Physical Exam:  Vitals:    22 1333   BP: 112/79   BP Location: Left arm   Patient Position: Sitting   Cuff Size: Adult Regular   Pulse: 94   Resp: 16   Temp: 97.8  F (36.6  C)   TempSrc: Oral   SpO2: 97%   Weight: 69 kg (152 lb 3.2 oz)     Body mass index is 26.96 kg/m .    Gen:  NAD  HEENT: mucous membranes moist  Neck: supple without lymphadenopathy  CV:  RRR  - no murmurs, rubs, or gallups  Pulm:  CTAB, no wheezes/rales/rhonchi  ABD: soft, nontender, no masses, no rebound, BS intact throughout  : deferred by patient   Psych: Mood and affect appropriate    LABS:  No results found for this or any previous visit (from the past 24 hour(s)).   Last PAP: No results found for: PAP. Plan at 6week follow up    ASSESSMENT AND PLAN     Tigre Ceballos is a  Female who is 21 year old years old with a PMHx significant for:     Patient Active Problem List   Diagnosis     Short cervix during pregnancy in second trimester     Supervision of high risk pregnancy, antepartum     Late prenatal care affecting pregnancy in second trimester     H/O cervical cerclage, currently pregnant, third trimester     At risk for diabetes mellitus     High-risk first pregnancy of young woman, unspecified trimester     Cervical cerclage suture present     Encounter for triage in pregnant patient     Encounter for vaginal delivery     Routine postpartum follow-up    1. 6 week post-partum visit: Doing well.     2. Post- partum depression screen:negative.     3. Breastfeeding problems or questions: No    4. Pap smear was not done at this time. Planning for 2-3 weeks     5. Immunizations given: TDAP  MMR  HepB  HepA  HPV    6. Contraception Plan:depo or other injectable next visit. Rx for sent to pharmacy. Risks and benefits discussed  with patient.     7. History of GDM:  no    -if yes, check A1c, or fasting glucose or 2 hour GTT now and every 3 years.    8. History of Hypertensive of pregnancy disorders: no    -if yes, evaluate blood pressure today and educate that aspirin and calcium may be helpful in preventing preecclampsia during next pregnancy if started early.    9.  Caring for baby:Mom states she is connected with WIC if needed and has adequate supplies for taking care of her baby.    10. Follow-up: RTC in 2 weeks for follow up of pap smear = or sooner if develops new or worsening symptoms.    Eh was seen today for recheck.    Diagnoses and all orders for this visit:    Routine postpartum follow-up         Nicholas Marte DO    Patient was precepted with and seen by Dr. Drake    [facility]  [address] [place] [id number] [phone]

## 2022-02-26 PROBLEM — Z98.890: Status: RESOLVED | Noted: 2021-10-25 | Resolved: 2022-02-26

## 2022-02-26 PROBLEM — O09.619: Status: RESOLVED | Noted: 2021-10-27 | Resolved: 2022-02-26

## 2022-02-26 PROBLEM — Z36.89 ENCOUNTER FOR TRIAGE IN PREGNANT PATIENT: Status: RESOLVED | Noted: 2022-02-03 | Resolved: 2022-02-26

## 2022-02-26 PROBLEM — O26.872 SHORT CERVIX DURING PREGNANCY IN SECOND TRIMESTER: Status: RESOLVED | Noted: 2021-10-25 | Resolved: 2022-02-26

## 2022-02-26 PROBLEM — Z91.89 AT RISK FOR DIABETES MELLITUS: Status: RESOLVED | Noted: 2021-10-27 | Resolved: 2022-02-26

## 2022-02-26 PROBLEM — O09.32 LATE PRENATAL CARE AFFECTING PREGNANCY IN SECOND TRIMESTER: Status: RESOLVED | Noted: 2021-10-27 | Resolved: 2022-02-26

## 2022-02-26 PROBLEM — O09.90 SUPERVISION OF HIGH RISK PREGNANCY, ANTEPARTUM: Status: RESOLVED | Noted: 2021-10-27 | Resolved: 2022-02-26

## 2022-02-26 PROBLEM — O34.30 CERVICAL CERCLAGE SUTURE PRESENT: Status: RESOLVED | Noted: 2022-02-02 | Resolved: 2022-02-26

## 2022-02-26 PROBLEM — O09.293: Status: RESOLVED | Noted: 2021-10-25 | Resolved: 2022-02-26

## 2022-03-23 ENCOUNTER — OFFICE VISIT (OUTPATIENT)
Dept: FAMILY MEDICINE | Facility: CLINIC | Age: 22
End: 2022-03-23
Payer: COMMERCIAL

## 2022-03-23 VITALS
OXYGEN SATURATION: 95 % | SYSTOLIC BLOOD PRESSURE: 107 MMHG | WEIGHT: 150.4 LBS | BODY MASS INDEX: 26.64 KG/M2 | HEART RATE: 80 BPM | RESPIRATION RATE: 16 BRPM | DIASTOLIC BLOOD PRESSURE: 74 MMHG | TEMPERATURE: 97.7 F

## 2022-03-23 DIAGNOSIS — Z30.013 ENCOUNTER FOR INITIAL PRESCRIPTION OF INJECTABLE CONTRACEPTIVE: ICD-10-CM

## 2022-03-23 PROCEDURE — 99213 OFFICE O/P EST LOW 20 MIN: CPT | Mod: 25

## 2022-03-23 PROCEDURE — 96372 THER/PROPH/DIAG INJ SC/IM: CPT | Performed by: STUDENT IN AN ORGANIZED HEALTH CARE EDUCATION/TRAINING PROGRAM

## 2022-03-23 RX ORDER — MEDROXYPROGESTERONE ACETATE 150 MG/ML
150 INJECTION, SUSPENSION INTRAMUSCULAR
Status: COMPLETED | OUTPATIENT
Start: 2022-03-23 | End: 2022-12-14

## 2022-03-23 RX ADMIN — MEDROXYPROGESTERONE ACETATE 150 MG: 150 INJECTION, SUSPENSION INTRAMUSCULAR at 11:02

## 2022-03-23 NOTE — PATIENT INSTRUCTIONS
Thank you for trusting us with your care.     Here's a summary of the visit today:   1. Eh is doing well 7 weeks post-partum.   2. Our OB RN will follow up with you about rescheduling with Dr. Bergeron's office   3. You received the Depo shot for contraception today. This can be repeated in 3 months  4. Return to clinic  In 1 year or sooner if have acute concerns        Thank you.     Dr. Marte

## 2022-03-23 NOTE — PROGRESS NOTES
DATE:  3/23/2022  CHIEF COMPLAINT:    Chief Complaint   Patient presents with     other     post partum             SUBJECTIVE       Eh JORDAN Ceballos is a  Female who is 21 year old years old with a PMHx significant for:cervical insufficiency     Patient Active Problem List   Diagnosis   (none) - all problems resolved or deleted       She is Here today for her 7 week post-partum visit. Delivery Date: 2022    She had a c/s for abnormal FHT of viable Baby boy, named Bryn who weighed 96oz  96 oz oz. There was not laceration.      Current Concerns: lack of sleep,     Post Partum ROS:  1) How are your feeding your baby and do you have any concerns about this? formula: Similac    2)  Formula feeding  8-12 times in 24 hours(every 2-4 hours) with night time awakenings for feeding every 2-4 hours.  If feedings are painful there may be problems with latching or completely emptying the one breast before switching to the next.  WIC or a lactation consultation can be helpful for these problems.  Do you have any red or painful areas of the breast, any fevers or chills: No These may be symptoms of mastitis and you should call your clinic if these occur.    Will you be returning to work or leaving your baby while breast feeding? No      Formula Feeding: Formula feeds should be 2-3 ounces every 2-4 hours and awakenings at night for feedings can also be every 2-4 hours.  Nipples on the bottles should be a slow flow for newborns.  Never add more water than directed on the formula can and use the mixture within 1 hour and then discard the remaining mixture.    LMP was 3/11-3/14. Would like the depo shot today    3) Post-partum Vaginal Bleeding: no    4) Vaginal Tissue: healing well, no complaints.     : scar healing well, abdominal strength returning and abdominal pain resolving.    5)Hemorrhoids: no    6) Returning to [employer]:   Patient has not had intercourse since delivery and complains of No discomfort.  It is  safe to do this 6 weeks after a vaginal delivery.  Your libido may be low due to the breastfeeding hormones (prolactin) or due to fatigue from taking care of baby and all of the touching and loving interactions you have with your baby.  This is normal and will fade with time.    7)Abdominal pain: no    8) Contraception: Exclusive breast feeding is a form of contraception but progesterone only and barrier methods of contraception are more reliable and safe during breastfeeding.  If not breastfeeding, any form of contraception is safe and recommended to space babies the recommended 18 months apart.   What form of contraception is desired? LMP was 3/11-3/14. Would like the depo shot today    9) Sleep/stress/support/caring for baby: yes     10) Postpartum depression screening was negative.          OBJECTIVE     Physical Exam:  Vitals:    22 1008   BP: 107/74   BP Location: Left arm   Patient Position: Sitting   Cuff Size: Adult Regular   Pulse: 80   Resp: 16   Temp: 97.7  F (36.5  C)   TempSrc: Oral   SpO2: 95%   Weight: 68.2 kg (150 lb 6.4 oz)     Body mass index is 26.64 kg/m .    Gen:  NAD  HEENT: mucous membranes moist  Neck: supple without lymphadenopathy  CV:  RRR  - no murmurs, rubs, or gallups  Pulm:  CTAB, no wheezes/rales/rhonchi  ABD: soft, nontender, no masses, no rebound, BS intact throughout  : normal female external genitalia, well healed perineum, normal post partum uterus,  adnexa without mass or tenderness  Psych: Mood and affect appropriate    LABS:  No results found for this or any previous visit (from the past 24 hour(s)).   Last PAP: No results found for: PAP    ASSESSMENT AND PLAN     Tigre Ceballos is a  Female who is 21 year old years old with a PMHx significant for:     Patient Active Problem List   Diagnosis   (none) - all problems resolved or deleted       1. 6 week post-partum visit: Doing well.     2. Post- partum depression screen:negative.     3. Breastfeeding problems or  questions: Yes, referred to lactation consultant or WIC for assistance.    4. Pap smear was done 10/25/2021    5. Immunizations given: none  TDAP  MMR  HepB  HepA  HPV    6. Contraception Plan:depo or other injectable. Risks and benefits discussed with patient.     7. History of GDM:no    -if yes, check A1c, or fasting glucose or 2 hour GTT now and every 3 years.    8. History of Hypertensive of pregnancy disorders:no    -if yes, evaluate blood pressure today and educate that aspirin and calcium may be helpful in preventing preecclampsia during next pregnancy if started early.    9.  Caring for baby: Mom states she is connected with WIC if needed and has adequate supplies for taking care of her baby.    10. Follow-up: RTC in 3 months for follow up of depo shot or sooner if develops new or worsening symptoms.    Diagnoses and all orders for this visit:    Routine postpartum follow-up  -     medroxyPROGESTERone (DEPO-PROVERA) injection 150 mg    Encounter for initial prescription of injectable contraceptive  -     medroxyPROGESTERone (DEPO-PROVERA) injection 150 mg         Nicholas Marte DO, PGY-1    Patient was precepted with and seen by Dr. Vidal DO    [facility]  [address] [place] [id number] [phone]

## 2022-03-23 NOTE — NURSING NOTE
Clinic Administered Medication Documentation    Administrations This Visit     medroxyPROGESTERone (DEPO-PROVERA) injection 150 mg     Admin Date  03/23/2022 Action  Given Dose  150 mg Route  Intramuscular Site  Right Deltoid Administered By  Justine Wong CMA    Ordering Provider: Osiel Drake DO    NDC: 75197-506-99    Lot#: [id number]    : [facility]    Patient Supplied?: No                  Depo Provera Documentation    URINE HCG: not indicated    Depo-Provera Standing Order inclusion/exclusion criteria reviewed.   Patient meets: inclusion criteria     BP: 107/74  LAST PAP/EXAM: No results found for: PAP    Prior to injection, verified patient identity using patient's name and date of birth. Medication was administered. Please see MAR and medication order for additional information.     Was entire vial of medication used? Yes  Vial/Syringe: Single dose vial  Expiration Date:  09/30/2023    Patient instructed to remain in clinic for 15 minutes.  NEXT INJECTION DUE: 6/9/22 - 6/23/22      Name of provider who requested the medication administration: Dr. Marte  Name of provider on site (faculty or community preceptor) at the time of performing the medication administration: Dr. Drake    Date of next administration: 6/9/22 - 6/23/22  Date of next office visit with provider to renew medication plan (must be seen annually): 03/23/2023

## 2022-04-05 ENCOUNTER — MEDICAL CORRESPONDENCE (OUTPATIENT)
Dept: HEALTH INFORMATION MANAGEMENT | Facility: CLINIC | Age: 22
End: 2022-04-05
Payer: COMMERCIAL

## 2022-05-16 ENCOUNTER — HEALTH MAINTENANCE LETTER (OUTPATIENT)
Age: 22
End: 2022-05-16

## 2022-06-15 ENCOUNTER — MEDICAL CORRESPONDENCE (OUTPATIENT)
Dept: HEALTH INFORMATION MANAGEMENT | Facility: CLINIC | Age: 22
End: 2022-06-15
Payer: COMMERCIAL

## 2022-06-22 ENCOUNTER — ALLIED HEALTH/NURSE VISIT (OUTPATIENT)
Dept: FAMILY MEDICINE | Facility: CLINIC | Age: 22
End: 2022-06-22
Payer: COMMERCIAL

## 2022-06-22 VITALS
HEART RATE: 86 BPM | BODY MASS INDEX: 26.75 KG/M2 | OXYGEN SATURATION: 98 % | DIASTOLIC BLOOD PRESSURE: 70 MMHG | SYSTOLIC BLOOD PRESSURE: 103 MMHG | RESPIRATION RATE: 18 BRPM | WEIGHT: 151 LBS

## 2022-06-22 DIAGNOSIS — Z30.9 CONTRACEPTIVE MANAGEMENT: Primary | ICD-10-CM

## 2022-06-22 PROCEDURE — 96372 THER/PROPH/DIAG INJ SC/IM: CPT | Performed by: STUDENT IN AN ORGANIZED HEALTH CARE EDUCATION/TRAINING PROGRAM

## 2022-06-22 PROCEDURE — 99207 PR NO BILLABLE SERVICE THIS VISIT: CPT

## 2022-06-22 RX ADMIN — MEDROXYPROGESTERONE ACETATE 150 MG: 150 INJECTION, SUSPENSION INTRAMUSCULAR at 11:02

## 2022-06-22 NOTE — NURSING NOTE
Clinic Administered Medication Documentation    Administrations This Visit     medroxyPROGESTERone (DEPO-PROVERA) injection 150 mg     Admin Date  06/22/2022 Action  Given Dose  150 mg Route  Intramuscular Site   Administered By  Cirilo Blanco CMA    Ordering Provider: Osiel Drake, DO    Patient Supplied?: No    Comments: Patient is on time for Depo, Next due date will be 9/8 - 9/22/2022 and a reminder card was given to patient.                  Depo Provera Documentation    URINE HCG: not indicated    Depo-Provera Standing Order inclusion/exclusion criteria reviewed.   Patient meets: inclusion criteria     BP: 103/70  LAST PAP/EXAM: No results found for: PAP    Prior to injection, verified patient identity using patient's name and date of birth. Medication was administered. Please see MAR and medication order for additional information.     Was entire vial of medication used? Yes  Vial/Syringe: Single dose vial  Expiration Date:  1/30/2024    Patient instructed to remain in clinic for 15 minutes.  NEXT INJECTION DUE: 9/7/22 - 9/22/22      Name of provider who requested the medication administration: Dr. Serra  Name of provider on site (faculty or community preceptor) at the time of performing the medication administration: Dr. Drake    Date of next administration: 9/8-9/22/2022  Date of next office visit with provider to renew medication plan (must be seen annually): 3/23/2023

## 2022-07-05 ENCOUNTER — LAB REQUISITION (OUTPATIENT)
Dept: LAB | Facility: CLINIC | Age: 22
End: 2022-07-05

## 2022-07-05 PROCEDURE — 86481 TB AG RESPONSE T-CELL SUSP: CPT | Performed by: INTERNAL MEDICINE

## 2022-07-06 LAB
GAMMA INTERFERON BACKGROUND BLD IA-ACNC: 0.14 IU/ML
M TB IFN-G BLD-IMP: NEGATIVE
M TB IFN-G CD4+ BCKGRND COR BLD-ACNC: 9.86 IU/ML
MITOGEN IGNF BCKGRD COR BLD-ACNC: 0 IU/ML
MITOGEN IGNF BCKGRD COR BLD-ACNC: 0.09 IU/ML
QUANTIFERON MITOGEN: 10 IU/ML
QUANTIFERON NIL TUBE: 0.14 IU/ML
QUANTIFERON TB1 TUBE: 0.14 IU/ML
QUANTIFERON TB2 TUBE: 0.23

## 2022-09-11 ENCOUNTER — HEALTH MAINTENANCE LETTER (OUTPATIENT)
Age: 22
End: 2022-09-11

## 2022-09-14 ENCOUNTER — OFFICE VISIT (OUTPATIENT)
Dept: FAMILY MEDICINE | Facility: CLINIC | Age: 22
End: 2022-09-14
Payer: COMMERCIAL

## 2022-09-14 VITALS
OXYGEN SATURATION: 99 % | BODY MASS INDEX: 26.39 KG/M2 | DIASTOLIC BLOOD PRESSURE: 73 MMHG | SYSTOLIC BLOOD PRESSURE: 110 MMHG | WEIGHT: 149 LBS | HEART RATE: 79 BPM | RESPIRATION RATE: 20 BRPM

## 2022-09-14 DIAGNOSIS — Z30.9 CONTRACEPTIVE MANAGEMENT: Primary | ICD-10-CM

## 2022-09-14 PROCEDURE — 96372 THER/PROPH/DIAG INJ SC/IM: CPT | Performed by: FAMILY MEDICINE

## 2022-09-14 RX ADMIN — MEDROXYPROGESTERONE ACETATE 150 MG: 150 INJECTION, SUSPENSION INTRAMUSCULAR at 10:06

## 2022-09-14 NOTE — NURSING NOTE
Clinic Administered Medication Documentation    Administrations This Visit     medroxyPROGESTERone (DEPO-PROVERA) injection 150 mg     Admin Date  09/14/2022 Action  Given Dose  150 mg Route  Intramuscular Site  Right Deltoid Administered By  Trey Forrester Mai    Ordering Provider: Osiel Drake, DO    Patient Supplied?: No    Comments: next depo due on 12/1/2022 to 12/15/2022.    patient is on time on the depo schedule calender.                  Depo Provera Documentation    URINE HCG: not indicated    Depo-Provera Standing Order inclusion/exclusion criteria reviewed.   Patient meets: inclusion criteria     BP: 110/73  LAST PAP/EXAM: No results found for: PAP    Prior to injection, verified patient identity using patient's name and date of birth. Medication was administered. Please see MAR and medication order for additional information.     Was entire vial of medication used? Yes  Vial/Syringe: Single dose vial  Expiration Date:  1/31/2024    Patient instructed to remain in clinic for 15 minutes.  NEXT INJECTION DUE: 12/01/22 - 12/15/22      Name of provider who requested the medication administration: Dr. Marte  Name of provider on site (faculty or community preceptor) at the time of performing the medication administration: Dr. Pedraza    Date of next administration: 12/1/2022 to 12/15/2022  Date of next office visit with provider to renew medication plan (must be seen annually): 03/23/2023

## 2022-12-14 ENCOUNTER — ALLIED HEALTH/NURSE VISIT (OUTPATIENT)
Dept: FAMILY MEDICINE | Facility: CLINIC | Age: 22
End: 2022-12-14
Payer: COMMERCIAL

## 2022-12-14 DIAGNOSIS — Z30.42 ENCOUNTER FOR SURVEILLANCE OF INJECTABLE CONTRACEPTIVE: Primary | ICD-10-CM

## 2022-12-14 PROCEDURE — 99207 PR NO BILLABLE SERVICE THIS VISIT: CPT

## 2022-12-14 PROCEDURE — 96372 THER/PROPH/DIAG INJ SC/IM: CPT | Performed by: STUDENT IN AN ORGANIZED HEALTH CARE EDUCATION/TRAINING PROGRAM

## 2022-12-14 RX ADMIN — MEDROXYPROGESTERONE ACETATE 150 MG: 150 INJECTION, SUSPENSION INTRAMUSCULAR at 10:48

## 2022-12-14 NOTE — NURSING NOTE
Clinic Administered Medication Documentation    Administrations This Visit     medroxyPROGESTERone (DEPO-PROVERA) injection 150 mg     Admin Date  12/14/2022 Action  Given Dose  150 mg Route  Intramuscular Site  Left Deltoid Administered By  Regina Kelly CMA    Ordering Provider: Osiel Drake, DO    Patient Supplied?: No                  Depo Provera Documentation    URINE HCG: not indicated    Depo-Provera Standing Order inclusion/exclusion criteria reviewed.   Patient meets: inclusion criteria     BP: Data Unavailable  LAST PAP/EXAM: No results found for: PAP    Prior to injection, verified patient identity using patient's name and date of birth. Medication was administered. Please see MAR and medication order for additional information.     Was entire vial of medication used? Yes  Vial/Syringe: Single dose vial  Expiration Date:  4/2024    Patient instructed to remain in clinic for 15 minutes.  NEXT INJECTION DUE: 3/2/23 - 3/16/23      Name of provider who requested the medication administration: Dr. Marte  Name of provider on site (faculty or community preceptor) at the time of performing the medication administration: Dr. Drake    Date of next administration: 3/2/23-3/16/23  Date of next office visit with provider to renew medication plan (must be seen annually): 3/2023

## 2023-03-08 ENCOUNTER — OFFICE VISIT (OUTPATIENT)
Dept: FAMILY MEDICINE | Facility: CLINIC | Age: 23
End: 2023-03-08
Payer: COMMERCIAL

## 2023-03-08 VITALS
DIASTOLIC BLOOD PRESSURE: 84 MMHG | WEIGHT: 146 LBS | HEIGHT: 62 IN | SYSTOLIC BLOOD PRESSURE: 115 MMHG | HEART RATE: 95 BPM | OXYGEN SATURATION: 99 % | BODY MASS INDEX: 26.87 KG/M2 | RESPIRATION RATE: 20 BRPM | TEMPERATURE: 98.1 F

## 2023-03-08 DIAGNOSIS — M79.661 PAIN IN RIGHT SHIN: ICD-10-CM

## 2023-03-08 DIAGNOSIS — Z30.42 ENCOUNTER FOR SURVEILLANCE OF INJECTABLE CONTRACEPTIVE: ICD-10-CM

## 2023-03-08 DIAGNOSIS — Z00.00 ROUTINE GENERAL MEDICAL EXAMINATION AT A HEALTH CARE FACILITY: Primary | ICD-10-CM

## 2023-03-08 LAB
CHOLEST SERPL-MCNC: 223 MG/DL
DEPRECATED CALCIDIOL+CALCIFEROL SERPL-MC: 26 UG/L (ref 20–75)
HBA1C MFR BLD: 5.4 % (ref 0–5.6)
HCV AB SERPL QL IA: NONREACTIVE
HDLC SERPL-MCNC: 38 MG/DL
LDLC SERPL CALC-MCNC: 162 MG/DL
NONHDLC SERPL-MCNC: 185 MG/DL
TRIGL SERPL-MCNC: 113 MG/DL

## 2023-03-08 PROCEDURE — 80061 LIPID PANEL: CPT

## 2023-03-08 PROCEDURE — 83036 HEMOGLOBIN GLYCOSYLATED A1C: CPT

## 2023-03-08 PROCEDURE — 36415 COLL VENOUS BLD VENIPUNCTURE: CPT

## 2023-03-08 PROCEDURE — 86803 HEPATITIS C AB TEST: CPT

## 2023-03-08 PROCEDURE — 99395 PREV VISIT EST AGE 18-39: CPT | Mod: GC

## 2023-03-08 PROCEDURE — 96372 THER/PROPH/DIAG INJ SC/IM: CPT | Performed by: FAMILY MEDICINE

## 2023-03-08 PROCEDURE — 82306 VITAMIN D 25 HYDROXY: CPT

## 2023-03-08 RX ORDER — PRENATAL VIT/IRON FUM/FOLIC AC 27MG-0.8MG
1 TABLET ORAL DAILY
Qty: 90 TABLET | Refills: 3 | Status: SHIPPED | OUTPATIENT
Start: 2023-03-08 | End: 2024-03-20

## 2023-03-08 RX ORDER — MEDROXYPROGESTERONE ACETATE 150 MG/ML
150 INJECTION, SUSPENSION INTRAMUSCULAR
Status: COMPLETED | OUTPATIENT
Start: 2023-03-08 | End: 2023-12-08

## 2023-03-08 RX ADMIN — MEDROXYPROGESTERONE ACETATE 150 MG: 150 INJECTION, SUSPENSION INTRAMUSCULAR at 11:17

## 2023-03-08 ASSESSMENT — ENCOUNTER SYMPTOMS
NAUSEA: 0
EYE PAIN: 0
NERVOUS/ANXIOUS: 0
HEMATURIA: 0
HEMATOCHEZIA: 0
SHORTNESS OF BREATH: 1
FEVER: 0
HEARTBURN: 0
PARESTHESIAS: 0
CONSTIPATION: 0
ABDOMINAL PAIN: 0
JOINT SWELLING: 0
WEAKNESS: 0
FREQUENCY: 0
SORE THROAT: 0
DYSURIA: 0
PALPITATIONS: 0
CHILLS: 0
HEADACHES: 0
DIZZINESS: 0
MYALGIAS: 0
COUGH: 0
BREAST MASS: 0
ARTHRALGIAS: 1
DIARRHEA: 0

## 2023-03-08 NOTE — NURSING NOTE
Clinic Administered Medication Documentation    Administrations This Visit     medroxyPROGESTERone (DEPO-PROVERA) injection 150 mg     Admin Date  03/08/2023 Action  $Given Dose  150 mg Route  Intramuscular Site  Left Deltoid Administered By  DANIS VERDE    Ordering Provider: Jamar Curtis MD    Patient Supplied?: No                  Depo Provera Documentation    URINE HCG: not indicated    Depo-Provera Standing Order inclusion/exclusion criteria reviewed.   Patient meets: inclusion criteria     BP: 115/84  LAST PAP/EXAM: No results found for: PAP    Prior to injection, verified patient identity using patient's name and date of birth. Medication was administered. Please see MAR and medication order for additional information.     Was entire vial of medication used? Yes  Vial/Syringe: Single dose vial  Expiration Date:  05/30/2024    Patient instructed to remain in clinic for 15 minutes.  NEXT INJECTION DUE: 5/24/23 - 6/7/23      Name of provider who requested the medication administration: Dr. Marte  Name of provider on site (faculty or community preceptor) at the time of performing the medication administration: Dr. Drake    Date of next administration: 05/25/2023 to 06/08/2023  Date of next office visit with provider to renew medication plan (must be seen annually): 03/08/2024

## 2023-03-08 NOTE — PROGRESS NOTES
SUBJECTIVE:   CC: Eh is an 22 year old who presents for preventive health visit.   Patient has been advised of split billing requirements and indicates understanding: Yes  Healthy Habits:     Getting at least 3 servings of Calcium per day:  NO    Bi-annual eye exam:  NO    Dental care twice a year:  NO    Sleep apnea or symptoms of sleep apnea:  None    Diet:  Regular (no restrictions)    Frequency of exercise:  2-3 days/week    Duration of exercise:  Greater than 60 minutes    Taking medications regularly:  Yes    Medication side effects:  Not applicable and None    PHQ-2 Total Score: 0    PROBLEMS TO ADD ON.    Today's PHQ-2 Score:   PHQ-2 ( 1999 [employer]) 3/8/2023   Q1: Little interest or pleasure in doing things 0   Q2: Feeling down, depressed or hopeless 0   PHQ-2 Score 0   PHQ-2 Total Score (12-17 Years)- Positive if 3 or more points; Administer PHQ-A if positive -   Q1: Little interest or pleasure in doing things Not at all   Q2: Feeling down, depressed or hopeless Not at all   PHQ-2 Score 0       Have you ever done Advance Care Planning? (For example, a Health Directive, POLST, or a discussion with a medical provider or your loved ones about your wishes): No, advance care planning information given to patient to review.  Patient plans to discuss their wishes with loved ones or provider.      Social History     Tobacco Use     Smoking status: Never     Smokeless tobacco: Never   Substance Use Topics     Alcohol use: Never         Alcohol Use 3/8/2023   Prescreen: >3 drinks/day or >7 drinks/week? No       Reviewed orders with patient.  Reviewed health maintenance and updated orders accordingly - Yes  Lab work is in process    Breast Cancer Screening:    History of abnormal Pap smear: NO - age 21-29 PAP every 3 years recommended  PAP / HPV Latest Ref Rng & Units 10/25/2021   PAP   Negative for Intraepithelial Lesion or Malignancy (NILM)     Reviewed and updated as needed this visit by clinical staff   Tobacco  " Allergies  Meds  Problems  Med Hx  Surg Hx  Fam Hx          Reviewed and updated as needed this visit by Provider   Tobacco  Allergies  Meds  Problems  Med Hx  Surg Hx  Fam Hx             Review of Systems   Constitutional: Negative for chills and fever.   HENT: Negative for congestion, ear pain, hearing loss and sore throat.    Eyes: Negative for pain and visual disturbance.   Respiratory: Positive for shortness of breath. Negative for cough.    Cardiovascular: Negative for chest pain, palpitations and peripheral edema.   Gastrointestinal: Negative for abdominal pain, constipation, diarrhea, heartburn, hematochezia and nausea.   Breasts:  Negative for tenderness, breast mass and discharge.   Genitourinary: Negative for dysuria, frequency, genital sores, hematuria, pelvic pain, urgency, vaginal bleeding and vaginal discharge.   Musculoskeletal: Positive for arthralgias. Negative for joint swelling and myalgias.   Skin: Negative for rash.   Neurological: Negative for dizziness, weakness, headaches and paresthesias.   Psychiatric/Behavioral: Negative for mood changes. The patient is not nervous/anxious.       OBJECTIVE:   /84   Pulse 95   Temp 98.1  F (36.7  C) (Oral)   Resp 20   Ht 1.575 m (5' 2\")   Wt 66.2 kg (146 lb)   SpO2 99%   BMI 26.70 kg/m    Physical Exam  GENERAL: healthy, alert and no distress  EYES: Eyes grossly normal to inspection, PERRL and conjunctivae and sclerae normal  HENT: ear canals and TM's normal, nose and mouth without ulcers or lesions  NECK: no adenopathy, no asymmetry, masses, or scars and thyroid normal to palpation  RESP: lungs clear to auscultation - no rales, rhonchi or wheezes  BREAST: normal without masses, tenderness or nipple discharge and no palpable axillary masses or adenopathy  CV: regular rate and rhythm, normal S1 S2, no S3 or S4, no murmur, click or rub, no peripheral edema and peripheral pulses strong  ABDOMEN: soft, nontender, no " "hepatosplenomegaly, no masses and bowel sounds normal  MS: no gross musculoskeletal defects noted, no edema  SKIN: no suspicious lesions or rashes  NEURO: Normal strength and tone, mentation intact and speech normal  PSYCH: mentation appears normal, affect normal/bright    Diagnostic Test Results:  Labs reviewed in Epic    ASSESSMENT/PLAN:   Eh was seen today for physical and birth control consult.    Diagnoses and all orders for this visit:    Routine general medical examination at a health care facility  -     Hepatitis C antibody; Future  -     Lipid panel reflex to direct LDL Fasting; Future  -     Hemoglobin A1c; Future  -     25- OH-Vitamin D; Future  -     Prenatal Vit-Fe Fumarate-FA (PRENATAL MULTIVITAMIN W/IRON) 27-0.8 MG tablet; Take 1 tablet by mouth daily  -     Hepatitis C antibody  -     Lipid panel reflex to direct LDL Fasting  -     Hemoglobin A1c  -     25- OH-Vitamin D    Encounter for surveillance of injectable contraceptive  -     medroxyPROGESTERone (DEPO-PROVERA) injection 150 mg    Pain in right shin  consider repletion vitamins if low. Advised reducing stress on the leg with exercise routine. If persistent pain, consider weight unloading boot. Consider Xray versus MRI to rule out shin splint versus occult fracture.      Patient has been advised of split billing requirements and indicates understanding: Yes      COUNSELING:  Reviewed preventive health counseling, as reflected in patient instructions       Regular exercise       Healthy diet/nutrition       Contraception       Family planning       Consider Hep C screening for all patients one time for ages 18-79 years      BMI:   Estimated body mass index is 26.7 kg/m  as calculated from the following:    Height as of this encounter: 1.575 m (5' 2\").    Weight as of this encounter: 66.2 kg (146 lb).         She reports that she has never smoked. She has never used smokeless tobacco.      Nicholas Marte DO, PGY-2  [facility] " [place]    Today I precepted with Dr. Ever MD, who agrees with the assessment and plan.

## 2023-03-08 NOTE — PROGRESS NOTES
Preceptor Attestation:    I discussed the patient with the resident and evaluated the patient in person. I have verified the content of the note, which accurately reflects my assessment of the patient and the plan of care.   Supervising Physician:  Jamar Curtis MD.

## 2023-03-08 NOTE — LETTER
March 21, 2023      Tigre Hernandezoo  [address] [place] [address]        Dear Ms.Lin,    We are writing to inform you of your test results.    Betsy Ceballos,     I hope you're well. I wanted to communicate with you the results of the tests that we did.     The laboratory results show you have elevated cholesterol. We can discuss the next steps at your next visit. Please let me know if you have any other questions or concerns.     Thank you!   Nicholas Marte, DO PGY2       Resulted Orders   Hepatitis C antibody   Result Value Ref Range    Hepatitis C Antibody Nonreactive Nonreactive    Narrative    Assay performance characteristics have not been established for newborns, infants, and children.   Lipid panel reflex to direct LDL Fasting   Result Value Ref Range    Cholesterol 223 (H) <200 mg/dL    Triglycerides 113 <150 mg/dL    Direct Measure HDL 38 (L) >=50 mg/dL    LDL Cholesterol Calculated 162 (H) <=100 mg/dL    Non HDL Cholesterol 185 (H) <130 mg/dL    Narrative    Cholesterol  Desirable:  <200 mg/dL    Triglycerides  Normal:  Less than 150 mg/dL  Borderline High:  150-199 mg/dL  High:  200-499 mg/dL  Very High:  Greater than or equal to 500 mg/dL    Direct Measure HDL  Female:  Greater than or equal to 50 mg/dL   Male:  Greater than or equal to 40 mg/dL    LDL Cholesterol  Desirable:  <100mg/dL  Above Desirable:  100-129 mg/dL   Borderline High:  130-159 mg/dL   High:  160-189 mg/dL   Very High:  >= 190 mg/dL    Non HDL Cholesterol  Desirable:  130 mg/dL  Above Desirable:  130-159 mg/dL  Borderline High:  160-189 mg/dL  High:  190-219 mg/dL  Very High:  Greater than or equal to 220 mg/dL   Hemoglobin A1c   Result Value Ref Range    Hemoglobin A1C 5.4 0.0 - 5.6 %      Comment:      Normal <5.7%   Prediabetes 5.7-6.4%    Diabetes 6.5% or higher     Note: Adopted from ADA consensus guidelines.   25- OH-Vitamin D   Result Value Ref Range    Vitamin D, Total (25-Hydroxy) 26 20 - 75 ug/L    Narrative    Season,  race, dietary intake, and treatment affect the concentration of 25-hydroxy-Vitamin D. Values may decrease during winter months and increase during summer months. Values 20-29 ug/L may indicate Vitamin D insufficiency and values <20 ug/L may indicate Vitamin D deficiency.    Vitamin D determination is routinely performed by an immunoassay specific for 25 hydroxyvitamin D3.  If an individual is on vitamin D2(ergocalciferol) supplementation, please specify 25 OH vitamin D2 and D3 level determination by LCMSMS test [id number].         If you have any questions or concerns, please call the clinic at the number listed above.       Sincerely,      Jamar Curtis MD

## 2023-05-16 ENCOUNTER — TELEPHONE (OUTPATIENT)
Dept: PHARMACY | Facility: CLINIC | Age: 23
End: 2023-05-16
Payer: COMMERCIAL

## 2023-05-16 DIAGNOSIS — H57.9 ITCHY EYES: Primary | ICD-10-CM

## 2023-05-16 NOTE — TELEPHONE ENCOUNTER
Patient states she woke up this morning with itchy red eye. Right side. Started on Sunday morning and has been getting worse.       No discharge/drainage. Tried an otc artificial tears drop yesterday. Not very helpful.     No other allergy symptoms.     Can trial Ketotifen drops. If not helpful, follow-up for provider visit for further assessment.

## 2023-06-01 ENCOUNTER — ALLIED HEALTH/NURSE VISIT (OUTPATIENT)
Dept: FAMILY MEDICINE | Facility: CLINIC | Age: 23
End: 2023-06-01
Payer: COMMERCIAL

## 2023-06-01 DIAGNOSIS — Z30.42 ENCOUNTER FOR SURVEILLANCE OF INJECTABLE CONTRACEPTIVE: Primary | ICD-10-CM

## 2023-06-01 PROCEDURE — 96372 THER/PROPH/DIAG INJ SC/IM: CPT | Performed by: FAMILY MEDICINE

## 2023-06-01 PROCEDURE — 99207 PR NO CHARGE NURSE ONLY: CPT

## 2023-06-01 RX ADMIN — MEDROXYPROGESTERONE ACETATE 150 MG: 150 INJECTION, SUSPENSION INTRAMUSCULAR at 14:03

## 2023-09-07 ENCOUNTER — ALLIED HEALTH/NURSE VISIT (OUTPATIENT)
Dept: FAMILY MEDICINE | Facility: CLINIC | Age: 23
End: 2023-09-07
Payer: COMMERCIAL

## 2023-09-07 DIAGNOSIS — Z30.42 ENCOUNTER FOR SURVEILLANCE OF INJECTABLE CONTRACEPTIVE: Primary | ICD-10-CM

## 2023-09-07 PROCEDURE — 96372 THER/PROPH/DIAG INJ SC/IM: CPT | Performed by: FAMILY MEDICINE

## 2023-09-07 PROCEDURE — 99207 PR NO CHARGE NURSE ONLY: CPT

## 2023-09-07 RX ADMIN — MEDROXYPROGESTERONE ACETATE 150 MG: 150 INJECTION, SUSPENSION INTRAMUSCULAR at 09:18

## 2023-09-07 NOTE — PROGRESS NOTES
Clinic Administered Medication Documentation      Depo Provera Documentation    Depo-Provera Standing Order inclusion/exclusion criteria reviewed.     Is this the initial or subsequent dose of Depo Provera? Subsequent dose - patient is within the acceptable window of time (11-15 weeks) for subsequent injection. Pregnancy test not indicated.    Patient meets: inclusion criteria     Is there an active order (written within the past 365 days, with administrations remaining, not ) in the chart? Yes.     Prior to injection, verified patient identity using patient's name and date of birth. Medication was administered. Please see MAR and medication order for additional information.     Vial/Syringe: Single dose vial. Was entire vial of medication used? Yes    Patient instructed to remain in clinic for 15 minutes and report any adverse reaction to staff immediately.  NEXT INJECTION DUE: 23 - 23    Patient has no refills remaining. Refill encounter opened, order pended and Routed to the provider

## 2023-12-08 ENCOUNTER — ALLIED HEALTH/NURSE VISIT (OUTPATIENT)
Dept: FAMILY MEDICINE | Facility: CLINIC | Age: 23
End: 2023-12-08
Payer: COMMERCIAL

## 2023-12-08 DIAGNOSIS — Z30.42 ENCOUNTER FOR SURVEILLANCE OF INJECTABLE CONTRACEPTIVE: Primary | ICD-10-CM

## 2023-12-08 PROCEDURE — 96372 THER/PROPH/DIAG INJ SC/IM: CPT | Performed by: FAMILY MEDICINE

## 2023-12-08 PROCEDURE — 99207 PR NO CHARGE NURSE ONLY: CPT

## 2023-12-08 RX ADMIN — MEDROXYPROGESTERONE ACETATE 150 MG: 150 INJECTION, SUSPENSION INTRAMUSCULAR at 13:05

## 2023-12-08 NOTE — PROGRESS NOTES
Clinic Administered Medication Documentation      Depo Provera Documentation    Depo-Provera Standing Order inclusion/exclusion criteria reviewed.     Is this the initial or subsequent dose of Depo Provera? Subsequent dose - patient is within the acceptable window of time (11-15 weeks) for subsequent injection. Pregnancy test not indicated.    Patient meets: inclusion criteria     Is there an active order (written within the past 365 days, with administrations remaining, not ) in the chart? Yes.     Prior to injection, verified patient identity using patient's name and date of birth. Medication was administered. Please see MAR and medication order for additional information.     Vial/Syringe: Single dose vial. Was entire vial of medication used? Yes    Patient instructed to remain in clinic for 15 minutes and report any adverse reaction to staff immediately.  NEXT INJECTION DUE: 24 - 3/22/24    Patient has no refills remaining.  Patient will schedule appointment to see PCP

## 2024-03-06 DIAGNOSIS — Z30.42 ENCOUNTER FOR SURVEILLANCE OF INJECTABLE CONTRACEPTIVE: Primary | ICD-10-CM

## 2024-03-06 RX ORDER — MEDROXYPROGESTERONE ACETATE 150 MG/ML
150 INJECTION, SUSPENSION INTRAMUSCULAR
Status: DISCONTINUED | OUTPATIENT
Start: 2024-03-06 | End: 2024-03-20

## 2024-03-20 ENCOUNTER — OFFICE VISIT (OUTPATIENT)
Dept: FAMILY MEDICINE | Facility: CLINIC | Age: 24
End: 2024-03-20
Payer: COMMERCIAL

## 2024-03-20 VITALS
HEART RATE: 91 BPM | WEIGHT: 145 LBS | OXYGEN SATURATION: 99 % | BODY MASS INDEX: 26.68 KG/M2 | HEIGHT: 62 IN | SYSTOLIC BLOOD PRESSURE: 116 MMHG | RESPIRATION RATE: 16 BRPM | TEMPERATURE: 98.2 F | DIASTOLIC BLOOD PRESSURE: 73 MMHG

## 2024-03-20 DIAGNOSIS — Z87.2 HISTORY OF ALLERGIC URTICARIA: ICD-10-CM

## 2024-03-20 DIAGNOSIS — Z00.00 VISIT FOR PREVENTIVE HEALTH EXAMINATION: Primary | ICD-10-CM

## 2024-03-20 DIAGNOSIS — Z30.42 ENCOUNTER FOR SURVEILLANCE OF INJECTABLE CONTRACEPTIVE: ICD-10-CM

## 2024-03-20 DIAGNOSIS — Z30.42 ENCOUNTER FOR DEPO-PROVERA CONTRACEPTION: Primary | ICD-10-CM

## 2024-03-20 DIAGNOSIS — Z11.3 SCREENING FOR STDS (SEXUALLY TRANSMITTED DISEASES): ICD-10-CM

## 2024-03-20 LAB
CHOLEST SERPL-MCNC: 195 MG/DL
FASTING STATUS PATIENT QL REPORTED: ABNORMAL
HCG UR QL: NEGATIVE
HDLC SERPL-MCNC: 40 MG/DL
LDLC SERPL CALC-MCNC: 140 MG/DL
NONHDLC SERPL-MCNC: 155 MG/DL
TRIGL SERPL-MCNC: 76 MG/DL

## 2024-03-20 PROCEDURE — 80061 LIPID PANEL: CPT

## 2024-03-20 PROCEDURE — 96372 THER/PROPH/DIAG INJ SC/IM: CPT | Performed by: STUDENT IN AN ORGANIZED HEALTH CARE EDUCATION/TRAINING PROGRAM

## 2024-03-20 PROCEDURE — 90472 IMMUNIZATION ADMIN EACH ADD: CPT

## 2024-03-20 PROCEDURE — 81025 URINE PREGNANCY TEST: CPT

## 2024-03-20 PROCEDURE — 99395 PREV VISIT EST AGE 18-39: CPT | Mod: 25

## 2024-03-20 PROCEDURE — 36415 COLL VENOUS BLD VENIPUNCTURE: CPT

## 2024-03-20 PROCEDURE — 90471 IMMUNIZATION ADMIN: CPT

## 2024-03-20 PROCEDURE — 90651 9VHPV VACCINE 2/3 DOSE IM: CPT

## 2024-03-20 PROCEDURE — 90715 TDAP VACCINE 7 YRS/> IM: CPT

## 2024-03-20 RX ORDER — MEDROXYPROGESTERONE ACETATE 150 MG/ML
150 INJECTION, SUSPENSION INTRAMUSCULAR
Status: ACTIVE | OUTPATIENT
Start: 2024-03-20 | End: 2025-03-15

## 2024-03-20 RX ORDER — EPINEPHRINE 0.3 MG/.3ML
0.3 INJECTION SUBCUTANEOUS
Qty: 0.6 ML | Refills: 0 | Status: SHIPPED | OUTPATIENT
Start: 2024-03-20 | End: 2024-07-29

## 2024-03-20 RX ADMIN — MEDROXYPROGESTERONE ACETATE 150 MG: 150 INJECTION, SUSPENSION INTRAMUSCULAR at 11:14

## 2024-03-20 SDOH — HEALTH STABILITY: PHYSICAL HEALTH: ON AVERAGE, HOW MANY DAYS PER WEEK DO YOU ENGAGE IN MODERATE TO STRENUOUS EXERCISE (LIKE A BRISK WALK)?: 4 DAYS

## 2024-03-20 SDOH — HEALTH STABILITY: PHYSICAL HEALTH: ON AVERAGE, HOW MANY MINUTES DO YOU ENGAGE IN EXERCISE AT THIS LEVEL?: 100 MIN

## 2024-03-20 ASSESSMENT — SOCIAL DETERMINANTS OF HEALTH (SDOH): HOW OFTEN DO YOU GET TOGETHER WITH FRIENDS OR RELATIVES?: THREE TIMES A WEEK

## 2024-03-20 NOTE — PROGRESS NOTES
Clinic Administered Medication Documentation        Patient was given Depo Provera. Prior to medication administration, verified patient's identity using patient s name and date of birth. Please see MAR and medication order for additional information. Patient instructed to remain in clinic for 15 minutes and report any adverse reaction to staff immediately.    Vial/Syringe: Single dose vial. Was entire vial of medication used? Yes    NEXT INJECTION DUE: 6/5/24 - 7/3/24    Name of provider who requested the medication administration: Dr. Marte  Name of provider on site (faculty or community preceptor) at the time of performing the medication administration: Dr. Drake    Date of next administration: 6/5/24 - 7/3/24  Date of next office visit with provider to renew medication plan (must be seen annually): 03/20/2025

## 2024-03-20 NOTE — PROGRESS NOTES
"Preventive Care Visit  [facility]  Nicholas Marte DO, Family Medicine  Mar 20, 2024      Assessment & Plan     Visit for preventive health examination  Defers pap test today. Last completed 10/2021, negative.   - Lipid panel reflex to direct LDL Fasting    History of allergic urticaria  - Adult Allergy/Asthma  Referral  - EPINEPHrine (ANY BX GENERIC EQUIV) 0.3 MG/0.3ML injection 2-pack  Dispense: 0.6 mL; Refill: 0    Encounter for surveillance of injectable contraceptive  Pending negative UPT, will receive depo injection.   - HCG qualitative urine    BMI  Estimated body mass index is 26.58 kg/m  as calculated from the following:    Height as of this encounter: 1.573 m (5' 1.93\").    Weight as of this encounter: 65.8 kg (145 lb).   Weight management plan: Discussed healthy diet and exercise guidelines    Counseling  Appropriate preventive services were discussed with this patient, including applicable screening as appropriate for fall prevention, nutrition, physical activity, Tobacco-use cessation, weight loss and cognition.  Checklist reviewing preventive services available has been given to the patient.  Reviewed patient's diet, addressing concerns and/or questions.   The patient was instructed to see the dentist every 6 months.         Return in 1 year (on 3/20/2025) for Preventative Health Visit.    Subjective   Eh is a 23 year old, presenting for the following:  Physical (Physical /Depo )         Health Care Directive  Patient does not have a Health Care Directive or Living Will: Discussed advance care planning with patient; information given to patient to review.    HPI  Pap smear, will defer to another visit     Would like referral to allergist referral due to intermittent hives.           3/20/2024   General Health   How would you rate your overall physical health? Good   Feel stress (tense, anxious, or unable to sleep) To some extent   (!) STRESS CONCERN      3/20/2024 " "  Nutrition   Three or more servings of calcium each day? Yes   Diet: Regular (no restrictions)   How many servings of fruit and vegetables per day? (!) 2-3   How many sweetened beverages each day? 0-1         3/20/2024   Exercise   Days per week of moderate/strenous exercise 4 days   Average minutes spent exercising at this level 100 min         3/20/2024   Social Factors   Frequency of gathering with friends or relatives Three times a week   Worry food won't last until get money to buy more No   Food not last or not have enough money for food? No   Do you have housing?  Yes   Are you worried about losing your housing? No   Lack of transportation? No   Unable to get utilities (heat,electricity)? No         3/20/2024   Dental   Dentist two times every year? (!) NO            Today's PHQ-2 Score:       3/20/2024    10:15 AM   PHQ-2 ( 1999 Pf[employer])   Q1: Little interest or pleasure in doing things 0   Q2: Feeling down, depressed or hopeless 0   PHQ-2 Score 0           3/20/2024   Substance Use   Alcohol more than 3/day or more than 7/wk No   Do you use any other substances recreationally? No     Social History     Tobacco Use    Smoking status: Never    Smokeless tobacco: Never   Substance Use Topics    Alcohol use: Never    Drug use: Never           3/20/2024   STI Screening   New sexual partner(s) since last STI/HIV test? No     History of abnormal Pap smear: NO - age 21-29 PAP every 3 years recommended        10/25/2021    11:22 AM   PAP / HPV   PAP Negative for Intraepithelial Lesion or Malignancy (NILM)            3/20/2024   Contraception/Family Planning   Questions about contraception or family planning No        Reviewed and updated as needed this visit by Provider                         Objective    Exam  /73   Pulse 91   Temp 98.2  F (36.8  C) (Oral)   Resp 16   Ht 1.573 m (5' 1.93\")   Wt 65.8 kg (145 lb)   SpO2 99%   BMI 26.58 kg/m     Estimated body mass index is 26.58 kg/m  as calculated from " "the following:    Height as of this encounter: 1.573 m (5' 1.93\").    Weight as of this encounter: 65.8 kg (145 lb).    Physical Exam  GENERAL: alert and no distress  NECK: no adenopathy, no asymmetry, masses, or scars  RESP: lungs clear to auscultation - no rales, rhonchi or wheezes  CV: regular rate and rhythm, normal S1 S2, no S3 or S4, no murmur, click or rub, no peripheral edema  ABDOMEN: soft, nontender, no hepatosplenomegaly, no masses and bowel sounds normal  MS: no gross musculoskeletal defects noted, no edema    Nicholas Marte DO, PGY-3  [facility]    Today I precepted with Dr. Vidal MD, who agrees with the assessment and plan.    "

## 2024-03-20 NOTE — PATIENT INSTRUCTIONS
Thank you for trusting us with your care.     Here's a summary of the visit today:   You will receive the tdap/HPV vaccine   Allergy testing referral placed            Thank you.     Dr. Marte            PREVENTIVE HEALTH RECOMMENDATIONS:   Most women need a yearly breast and pelvic exam.    A PAP screen, a test done DURING a pelvic exam, is NO longer recommended yearly.    March 2013, screening guidelines recommended by ACOG for PAP screen are:    1) First pap at age 21.    2) Pap every 3 years until age 30.    3) After age 30, pap every 3 years or Pap with HR HPV screen every 5 years until age 65.  4) Women do NOT need a vaginal Pap screen after a hysterectomy (surgical removal of the uterus) when they have not had cancer.    Exceptions:  1) Yearly pap if HIV+ or immunosuppressed secondary to organ transplant  2) RADHA II-III continue routine screening for 20 years.    I encourage you continue looking for opportunities to choose a healthy lifestyle:       * Choose to eat a heart healthy diet. Check out the FOOD PLATE guidelines at: http://www.chooseTagooplate.gov/ for helpful hints on weight and cholesterol management.  Balance your caloric intake with exercise to maintain a BMI in the 22 to 26 range. For bone health: Eat calcium-rich foods like yogurt, broccoli or take chewable calcium pills (500 to 600 mg) twice a day with food.       * Exercise for at least an average of 30 minutes a day, 5 days of the week. This will help you control your weight, release stress, and help prevent disease.      * Take a Vitamin D3 supplement daily fall through spring and during summer unless you [id number]' full body sun exposure to skin without sunscreen.      * DO wear sunscreen to prevent skin cancer after the first 15-30 minutes.      * Identify stressors in your life, find ways to release the stress, and, make time for yourself. PLEASE ask for help if mood changes last longer than two weeks.     * Limit alcohol to one drink per  day.  No smoking.  Avoid second hand smoke. If you smoke, ask for help to stop.       *  If you are in a sexual relationship, talk with your partner about possible infection risks and take action to protect yourself from exposure to a sexual infection.    Please request an infection screen for STIs (sexually transmitted infections) if you are less than age 26 OR believe that you may be at risk.     Get a flu shot each year. Get a tetanus shot every 10 years. EVERYONE needs a pertussis (Whooping cough) booster.    See your dentist twice a year for an exam and preventive care cleaning.     Consider the following screen tests:    1) cholesterol test every 5 years.     2) yearly mammogram after age 40 unless you have identified risks.    3) colonoscopy every 10 years after age 50 unless you have identified risks.    4) diabetes blood test screening if you are at risk for diabetes.      Additional information that you may also find helpful:  The Internet now gives us access to LOTS of information -- some of it helpful, research documented and also plenty of harmful, anecdotal information that may not pertain to your situtaion. Consider visiting the following websites for accurate health information:    www.vitamindcouncil.org/ : Info and ongoing research re Vitamin D    www.fairview.org : Up to date and easily searchable information on multiple topics.    www.medlineplus.gov : medication info, interactive tutorials, watch real surgeries online    www.cdc.gov : public health info, travel advisories, epidemics (H1N1)    www.anika/std.org: current research re diagnosis, treatment and prevention of sexually contacted infections.    www.health.Count includes the [facility].[place].us : [place] dept of heatl, public health issues in [place], N1N1    www.familydoctor.org : good info from the Academy of Family Physicians

## 2024-07-02 ENCOUNTER — ALLIED HEALTH/NURSE VISIT (OUTPATIENT)
Dept: FAMILY MEDICINE | Facility: CLINIC | Age: 24
End: 2024-07-02
Payer: COMMERCIAL

## 2024-07-02 DIAGNOSIS — Z30.42 ENCOUNTER FOR DEPO-PROVERA CONTRACEPTION: Primary | ICD-10-CM

## 2024-07-02 PROCEDURE — 99207 PR NO CHARGE NURSE ONLY: CPT

## 2024-07-02 PROCEDURE — 96372 THER/PROPH/DIAG INJ SC/IM: CPT | Performed by: STUDENT IN AN ORGANIZED HEALTH CARE EDUCATION/TRAINING PROGRAM

## 2024-07-02 RX ADMIN — MEDROXYPROGESTERONE ACETATE 150 MG: 150 INJECTION, SUSPENSION INTRAMUSCULAR at 15:46

## 2024-07-02 NOTE — PROGRESS NOTES
Clinic Administered Medication Documentation      Depo Provera Documentation    Depo-Provera Standing Order inclusion/exclusion criteria reviewed.     Is this the initial or subsequent dose of Depo Provera? Subsequent dose - patient is within the acceptable window of time (11-15 weeks) for subsequent injection. Pregnancy test not indicated.    Patient meets: inclusion criteria     Is there an active order (written within the past 365 days, with administrations remaining, not ) in the chart? Yes.     Prior to injection, verified patient identity using patient's name and date of birth. Medication was administered. Please see MAR and medication order for additional information.     Vial/Syringe: Single dose vial. Was entire vial of medication used? Yes    Patient instructed to remain in clinic for 15 minutes, report any adverse reaction to staff immediately, and remain in clinic for 15 minutes and report any adverse reaction to staff immediately but patient declined.  NEXT INJECTION DUE: 24 - 10/15/24    Verified that the patient has refills remaining in their prescription.

## 2024-07-29 ENCOUNTER — OFFICE VISIT (OUTPATIENT)
Dept: ALLERGY | Facility: CLINIC | Age: 24
End: 2024-07-29
Payer: COMMERCIAL

## 2024-07-29 VITALS
WEIGHT: 150 LBS | BODY MASS INDEX: 27.6 KG/M2 | OXYGEN SATURATION: 99 % | HEART RATE: 73 BPM | HEIGHT: 62 IN | RESPIRATION RATE: 16 BRPM

## 2024-07-29 DIAGNOSIS — L50.8 CHRONIC AUTOIMMUNE URTICARIA: Primary | ICD-10-CM

## 2024-07-29 DIAGNOSIS — R09.82 POST-NASAL DRIP: ICD-10-CM

## 2024-07-29 PROCEDURE — 99243 OFF/OP CNSLTJ NEW/EST LOW 30: CPT | Performed by: ALLERGY & IMMUNOLOGY

## 2024-07-29 PROCEDURE — 82785 ASSAY OF IGE: CPT | Performed by: ALLERGY & IMMUNOLOGY

## 2024-07-29 PROCEDURE — 86003 ALLG SPEC IGE CRUDE XTRC EA: CPT | Performed by: ALLERGY & IMMUNOLOGY

## 2024-07-29 PROCEDURE — 36415 COLL VENOUS BLD VENIPUNCTURE: CPT | Performed by: ALLERGY & IMMUNOLOGY

## 2024-07-29 NOTE — LETTER
"7/29/2024      Tigre Ceballos  [address] [place] [address]      Dear Colleague,    Thank you for referring your patient, Tigre Ceballos, to the [facility]. Please see a copy of my visit note below.          Subjective  Tigre is a 24 year old, presenting for the following health issues:  Allergy Consult    HPI     Chief complaint: Hives    History of present illness: This is a pleasant 24-year-old woman that I was asked to see for evaluation of hives by Dr. Drake.  Patient states that she has had hives off and on for the last 2 years.  She describes them as red itchy raised bumps that come and go over the course of 1 to 2 days.  She has the symptoms 1-2 times per month.  They can occur anywhere in her body but seem to worsen after she is hot.  She does not use any allergy medication for this.  She was prescribed an epinephrine device for this in March but is not carrying this.  She has noted they seem to worsen after she eats shrimp.  She is worried she has other food allergy environmental allergies.  She does note a lot of drainage on the back of her throat that sometimes causes difficulty breathing.  She does not use any nasal sprays or rinses regularly.  No supplements that she takes over-the-counter.  No nonsteroidal anti-inflammatory drugs.  No bruising to the hives.  She denies any joint pain or belly pain.    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  Physical Exam       Gen: Pleasant female not in acute distress  HEENT: Eyes no erythema of the bulbar or palpebral conjunctiva, no edema.   Skin: Dermatographia, 1 small 2 mm hive on her left forearm  Psych: Alert and oriented times 3    Impression report and plan:  1.  Chronic " urticaria    The patient has chronic urticaria.  Chronic urticaria is not due to a specific allergen.  This is not due to food allergy but did agree to treat testing given that she is concerned she may be allergic to shrimp as symptoms worsened after eating shrimp.  Many patients with chronic urticaria resolve within a few years.  Reviewed exacerbating and concerning signs of chronic urticaria.    2.  Postnasal drainage    Recommend specific IgE to the [place] respiratory disease panel.  Patient understands this would not be causing her hives.  Recommend Astelin nasal spray pending the allergy testing.        Signed Electronically by: Angelic JONES MD        Again, thank you for allowing me to participate in the care of your patient.        Sincerely,        Angelic JONES MD

## 2024-07-29 NOTE — PROGRESS NOTES
"      Subjective   Eh is a 24 year old, presenting for the following health issues:  Allergy Consult    HPI     Chief complaint: Hives    History of present illness: This is a pleasant 24-year-old woman that I was asked to see for evaluation of hives by Dr. Drake.  Patient states that she has had hives off and on for the last 2 years.  She describes them as red itchy raised bumps that come and go over the course of 1 to 2 days.  She has the symptoms 1-2 times per month.  They can occur anywhere in her body but seem to worsen after she is hot.  She does not use any allergy medication for this.  She was prescribed an epinephrine device for this in March but is not carrying this.  She has noted they seem to worsen after she eats shrimp.  She is worried she has other food allergy environmental allergies.  She does note a lot of drainage on the back of her throat that sometimes causes difficulty breathing.  She does not use any nasal sprays or rinses regularly.  No supplements that she takes over-the-counter.  No nonsteroidal anti-inflammatory drugs.  No bruising to the hives.  She denies any joint pain or belly pain.    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  Physical Exam       Gen: Pleasant female not in acute distress  HEENT: Eyes no erythema of the bulbar or palpebral conjunctiva, no edema.   Skin: Dermatographia, 1 small 2 mm hive on her left forearm  Psych: Alert and oriented times 3    Impression report and plan:  1.  Chronic urticaria    The patient has chronic urticaria.  Chronic urticaria is not due to a specific allergen.  This is not due to food allergy but did agree to treat testing given that she is concerned she may be allergic to shrimp " as symptoms worsened after eating shrimp.  Many patients with chronic urticaria resolve within a few years.  Reviewed exacerbating and concerning signs of chronic urticaria.    2.  Postnasal drainage    Recommend specific IgE to the [place] respiratory disease panel.  Patient understands this would not be causing her hives.  Recommend Astelin nasal spray pending the allergy testing.        Signed Electronically by: Angelic JONES MD

## 2024-07-29 NOTE — PATIENT INSTRUCTIONS
Check lab tests    Chronic autoimmune hives    Cetirizine (Zyrtec) 10 mg daily (up to two tabs twice daily)    Reassess every 2-4 weeks    Notify if not controlled  Most patients resolve within 2 years

## 2024-07-31 LAB — SHRIMP IGE QN: <0.1 KU(A)/L

## 2024-08-01 LAB

## 2025-02-03 ENCOUNTER — TELEPHONE (OUTPATIENT)
Dept: FAMILY MEDICINE | Facility: CLINIC | Age: 25
End: 2025-02-03
Payer: COMMERCIAL

## 2025-02-03 NOTE — TELEPHONE ENCOUNTER
Patient Quality Outreach    Patient is due for the following:   Cervical Cancer Screening - PAP Needed    Action(s) Taken:   Schedule a office visit for PAP    Type of outreach:    Phone, left message for patient/parent to call back.    Questions for provider review:    None       Jeremiah Forrester, CMA

## 2025-04-26 ENCOUNTER — HEALTH MAINTENANCE LETTER (OUTPATIENT)
Age: 25
End: 2025-04-26

## 2025-04-30 DIAGNOSIS — N91.2 AMENORRHEA: Primary | ICD-10-CM

## 2025-04-30 NOTE — PROGRESS NOTES
"  Assessment & Plan     Pregnancy test positive  Amenorrhea  Pregnancy examination or test, pregnancy unconfirmed  Pregnancy was unplanned, but is welcomed. Hx of 36 week  due to fetal intolerance of labor. Had a cervical cerclage with prior pregnancy due to short cervix, US ordered. Taking prenatal vitamins, no n/v, vaginal bleeding. DESTIN by approximate LMP 25.   - US OB <14 WKS SINGLE OR FIRST GESTATION  - HCG qualitative urine  - Continue prenatal vitamins  - Follow up for initial OB visit, labs, and ultrasound    Follow-up  Return for initial OB visit and ultrasound.    Subjective   Eh is a 24 year old, presenting for the following health issues:  Pregnancy Test    HPI      Eh Lin is a 24 year old here today for confirmatory UPT.   Pregnancy was unplanned, but is welcomed.   No nausea, vomiting.   No vaginal bleeding.   Taking prenatal vitamins.     Prior pregnancy was premature around 36 weeks. Had cerclage for short cervix.    Born via  due to fetal intolerance.       Review of Systems  Constitutional, neuro, ENT, endocrine, pulmonary, cardiac, gastrointestinal, genitourinary, musculoskeletal, integument and psychiatric systems are negative, except as otherwise noted.      Objective    /73   Pulse 95   Temp 98.2  F (36.8  C) (Tympanic)   Resp 16   Ht 1.577 m (5' 2.1\")   Wt 65.4 kg (144 lb 3.2 oz)   LMP 2025 (Approximate)   SpO2 100%   Breastfeeding No   BMI 26.29 kg/m    Body mass index is 26.29 kg/m .  Physical Exam   GENERAL: alert and no distress  EYES: Eyes grossly normal to inspection, PERRL and conjunctivae and sclerae normal  RESP: lungs clear to auscultation - no rales, rhonchi or wheezes  CV: regular rate and rhythm, normal S1 S2, no S3 or S4, no murmur, click or rub, no peripheral edema  MS: no gross musculoskeletal defects noted, no edema  SKIN: no suspicious lesions or rashes  NEURO: Normal strength and tone, mentation intact and speech normal  PSYCH: " mentation appears normal, affect normal/bright    Results for orders placed or performed in visit on 05/02/25 (from the past 24 hours)   HCG qualitative urine   Result Value Ref Range    hCG Urine Qualitative Positive (A) Negative         Discussed with attending, Dr. Rony Orozco.   Signed Electronically by: Malaika Regalado DO PGy3

## 2025-05-02 ENCOUNTER — OFFICE VISIT (OUTPATIENT)
Dept: FAMILY MEDICINE | Facility: CLINIC | Age: 25
End: 2025-05-02
Payer: COMMERCIAL

## 2025-05-02 VITALS
DIASTOLIC BLOOD PRESSURE: 73 MMHG | TEMPERATURE: 98.2 F | WEIGHT: 144.2 LBS | OXYGEN SATURATION: 100 % | HEART RATE: 95 BPM | HEIGHT: 62 IN | RESPIRATION RATE: 16 BRPM | SYSTOLIC BLOOD PRESSURE: 110 MMHG | BODY MASS INDEX: 26.54 KG/M2

## 2025-05-02 DIAGNOSIS — N91.2 AMENORRHEA: ICD-10-CM

## 2025-05-02 DIAGNOSIS — Z32.01 PREGNANCY TEST POSITIVE: Primary | ICD-10-CM

## 2025-05-02 DIAGNOSIS — Z32.00 PREGNANCY EXAMINATION OR TEST, PREGNANCY UNCONFIRMED: ICD-10-CM

## 2025-05-02 LAB — HCG UR QL: POSITIVE

## 2025-05-02 PROCEDURE — 3074F SYST BP LT 130 MM HG: CPT

## 2025-05-02 PROCEDURE — 81025 URINE PREGNANCY TEST: CPT

## 2025-05-02 PROCEDURE — 99213 OFFICE O/P EST LOW 20 MIN: CPT | Mod: GC

## 2025-05-02 PROCEDURE — 3078F DIAST BP <80 MM HG: CPT

## 2025-05-02 NOTE — PATIENT INSTRUCTIONS
Congratulations!     - Gayle (OB nurse) will call to schedule your new OB appointment and ultrasound  - Continue taking your prenatal vitamins

## 2025-05-02 NOTE — PROGRESS NOTES
"Preceptor attestation:  Vital signs reviewed: /73   Pulse 95   Temp 98.2  F (36.8  C) (Tympanic)   Resp 16   Ht 1.577 m (5' 2.1\")   Wt 65.4 kg (144 lb 3.2 oz)   LMP 02/06/2025 (Approximate)   SpO2 100%   Breastfeeding No   BMI 26.29 kg/m      Patient seen, evaluated, and discussed with the resident.  I verified the content of the note, which accurately reflects my assessment of the patient and the plan of care.    Supervising physician: Rony Orozco MD  [facility]  "

## 2025-05-06 ENCOUNTER — HOSPITAL ENCOUNTER (OUTPATIENT)
Dept: ULTRASOUND IMAGING | Facility: HOSPITAL | Age: 25
Discharge: HOME | End: 2025-05-06
Payer: COMMERCIAL

## 2025-05-06 DIAGNOSIS — N91.2 AMENORRHEA: ICD-10-CM

## 2025-05-06 DIAGNOSIS — Z32.01 PREGNANCY TEST POSITIVE: ICD-10-CM

## 2025-05-06 PROCEDURE — 76801 OB US < 14 WKS SINGLE FETUS: CPT

## 2025-05-13 NOTE — PROGRESS NOTES
First Obstetric Visit           Assessment and Plan     Tigre Ceballos is a 24 year old , at Unknown weeks of pregnancy with DESTIN of 2025 by LMP consistent with 13 week ultrasound.  Patient's history is high risk for the following reasons: History of cerclage placement for cervical incompetence in prior pregnancy    # Other concerns (medical problems, high risk surgical/obstetric history, high risk social situation, etc):   - In addition to routine prenatal care, patient will need MFM referral for consideration of prophylactic cerclage.  - Problem list reviewed and updated.    # General prenatal care management:  - Dating US obtained and dating confirmed? Yes   - Taking PNV/Folate? Yes  - Prenatal vitamins ordered.  - New OB labs: CBC, blood type and antibody screen, HIV, VDRL, hep B sAg, rubella, UA/UC, gonorrhea/chlamydia, Hepatitis B immunity done today.  - Will obtain early A1c per clinic protocol.  - Discussed genetic screening. Patient does not want to pursue screening. Discussed risks and benefits of first trimester screen for trisomies, patient declined or was too late in gestational age for this test.   - Discussed screening for thalassemia and/or sickle cell anemia. Patient does not want to pursue Hb electrophoresis.     # Additional Pregnancy Risk Assessment:   - Preeclampsia risk: The patient does not have a history of:   Any ONE of the following high risk factors:  Pregestational DM1 or DM2  Chronic HTN  Autoimmune dz (Eg SLE, APLS)   H/o Preeclampsia in prior pregnancy  Multifetal gestation  6.   Renal Disease     TWO or more of the following moderate risk factors:  Nulliparity or > 10 years since last birth  Obesity (BMI > 30)  H/o preeclampsia in mother or sister  Age >= 35 years  Experienced anti-black racism or social/structural factors that could impact health  Personal history factors (prior SGA/low birthweight, prior fetal demise)  so WILL NOT start low dose aspirin (81mg) at 12-28  weeks (ideally 12-16 weeks) to prevent preeclampsia.    -  birth risk: The patient does have a history of spontaneous  birth. Will refer to M, as below.    # Counseling given:   - Follow up in 4 weeks for return OB visit.  - Recommended weight gain for pregnancy: 15-25 lbs (pregravid BMI 25-29.9)  - Instructed on best evidence for: healthy diet and foods to avoid; exercise and activity during pregnancy; avoiding exposure to toxoplasmosis; safe use of seatbelts during pregnancy; and maintenance of a generally healthy lifestyle  - Patient to see OB educator/ RN today and/or next visit.  - Discussed the harms, benefits, side effects and alternative therapies for current prescribed and OTC medications.     Supervision of high risk pregnancy, antepartum  History of cervical cerclage, currently pregnant  24 year old  here for first prenatal visit at 13 weeks 6 days with history of cerclage placement for cervical incompetence and prior pregnancy.  This pregnancy thus far uncomplicated.  Patient without any questions or concerns today.  Obtain routine first OB labs.  Will refer to MFM for transvaginal cervical length and consideration of cerclage placement.  Will require OB referral for TOLAC later in pregnancy.  Defer Pap to postpartum.  -     ABO/Rh Type-HML; Future  -     Antibody Screen; Future  -     CBC with Plt; Future  -     Culture Urine; Future  -     Hepatitis B Surface Ag; Future  -     HIV Ag/Ab Screen [facility]; Future  -     Lead, Blood; Future  -     Syphilis Screen [facility]; Future  -     Chlamydia trachomatis PCR  URINE; Future  -     Neisseria gonorrhoeae PCR  URINE; Future  -     Hepatitis B Surface Ab; Future  -     [facility] Referral - Pregnancy; Future  -     Hemoglobin A1c; Future     Options for treatment and follow-up care were reviewed with the patient and/or guardian. Tigre ORTEGA Lin and/or guardian engaged in the decision making process and verbalized understanding of the  options discussed and agreed with the final plan.    Felicitas Preciado MD         HPI       Tigre Ceballos is a 24 year old woman who presents for an initial prenatal visit at Unknown weeks of pregnancy with DESTIN of 2025 by LMP of Patient's last menstrual period was 2025 (approximate)..      She has not had bleeding since her LMP.   She has not had nausea.   Good PO tolerance  This was a planned pregnancy.     ROS:  No - Chest Pain  No - Shortness of Breath  No - Abdominal pain   No - Dysuria   No - Vaginal Discharge      OTHER CONCERNS: None          OB HISTORY     OB History    Para Term  AB Living   2 1 0 1 0 1   SAB IAB Ectopic Multiple Live Births   0 0 0 0 1      # Outcome Date GA Lbr Hemanth/2nd Weight Sex Type Anes PTL Lv   2 Current            1  22 36w3d  2.722 kg (6 lb) M CS-LTranv Spinal N NETTA      Birth Comments: c-sect for fetal distress      Complications: Fetal Intolerance      Name: Bryn Ceballos      Apgar1: 9  Apgar5: 9            MEDICAL/SURGICAL HISTORY     Patient Active Problem List   Diagnosis   (none) - all problems resolved or deleted     Past Surgical History:   Procedure Laterality Date    CERCLAGE CERVICAL N/A 10/25/2021    Procedure: CERCLAGE, CERVIX, VAGINAL APPROACH;  Surgeon: Patrick Gallegos MD;  Location:  [facility]     SECTION N/A 2022    Procedure:  SECTION;  Surgeon: Gerry Bergeron MD;  Location: [facility] OR     No Known Allergies  See nurse history note, reviewed in detail.           MEDICATIONS      No current outpatient medications on file.           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 [employer]  Tobacco/nicotine/betel nut: None  Alcohol: None  Other substances (including marijuana): None           Physical Exam      /68 (BP Location: Right arm, Patient Position: Sitting, Cuff Size: Adult  "Regular)   Pulse 88   Temp 98.3  F (36.8  C) (Oral)   Resp 18   Ht 1.575 m (5' 2\")   Wt 64.9 kg (143 lb)   LMP 2025 (Approximate)   SpO2 98%   BMI 26.16 kg/m    GENERAL: healthy, alert and no distress  NECK: no tenderness, no adenopathy, no asymmetry, no masses, no stiffness; thyroid- normal to palpation  RESP: lungs clear to auscultation - no rales, no rhonchi, no wheezes  CV: regular rates and rhythm, normal S1 S2, no S3 or S4 and no murmur, no click or rub  ABDOMEN: soft, no tenderness, no  hepatosplenomegaly, no masses, normal bowel sounds  MS: extremities- no gross deformities noted, no edema  SKIN: no suspicious lesions, no rashes  PSYCH: Alert and oriented times 3; coherent speech, normal rate and volume, able to articulate logical thoughts, able to abstract reason, no tangential thoughts, no hallucinations or delusions. Affect is normal.  Well healed scar from  section. Fundal height not measured, FHT 150s    Past labs reviewed:  B POS  Varicella immune Yes  Hepatitis B immune Unknown  Last pap .  She is due for this today.    =========================================    "

## 2025-05-14 ENCOUNTER — ALLIED HEALTH/NURSE VISIT (OUTPATIENT)
Dept: FAMILY MEDICINE | Facility: CLINIC | Age: 25
End: 2025-05-14
Payer: COMMERCIAL

## 2025-05-14 ENCOUNTER — OFFICE VISIT (OUTPATIENT)
Dept: FAMILY MEDICINE | Facility: CLINIC | Age: 25
End: 2025-05-14
Payer: COMMERCIAL

## 2025-05-14 VITALS
RESPIRATION RATE: 18 BRPM | HEART RATE: 88 BPM | BODY MASS INDEX: 26.31 KG/M2 | DIASTOLIC BLOOD PRESSURE: 68 MMHG | WEIGHT: 143 LBS | HEIGHT: 62 IN | SYSTOLIC BLOOD PRESSURE: 105 MMHG | OXYGEN SATURATION: 98 % | TEMPERATURE: 98.3 F

## 2025-05-14 DIAGNOSIS — O09.299 HISTORY OF CERVICAL CERCLAGE, CURRENTLY PREGNANT: Primary | ICD-10-CM

## 2025-05-14 DIAGNOSIS — O34.30 CERVICAL INSUFFICIENCY IN PREGNANCY, ANTEPARTUM: Primary | ICD-10-CM

## 2025-05-14 DIAGNOSIS — O09.299 HISTORY OF CERVICAL CERCLAGE, CURRENTLY PREGNANT: ICD-10-CM

## 2025-05-14 DIAGNOSIS — Z98.890 HISTORY OF CERVICAL CERCLAGE, CURRENTLY PREGNANT: Primary | ICD-10-CM

## 2025-05-14 DIAGNOSIS — Z98.890 HISTORY OF CERVICAL CERCLAGE, CURRENTLY PREGNANT: ICD-10-CM

## 2025-05-14 DIAGNOSIS — Z98.891 HX OF CESAREAN SECTION: ICD-10-CM

## 2025-05-14 DIAGNOSIS — O09.90 SUPERVISION OF HIGH RISK PREGNANCY, ANTEPARTUM: Primary | ICD-10-CM

## 2025-05-14 DIAGNOSIS — O09.90 SUPERVISION OF HIGH RISK PREGNANCY, ANTEPARTUM: ICD-10-CM

## 2025-05-14 DIAGNOSIS — O09.899 HISTORY OF PRETERM DELIVERY, CURRENTLY PREGNANT: ICD-10-CM

## 2025-05-14 LAB
ABO + RH BLD: NORMAL
BLD GP AB SCN SERPL QL: NEGATIVE
ERYTHROCYTE [DISTWIDTH] IN BLOOD BY AUTOMATED COUNT: 12.3 % (ref 10–15)
EST. AVERAGE GLUCOSE BLD GHB EST-MCNC: 94 MG/DL
HBA1C MFR BLD: 4.9 % (ref 0–5.6)
HCT VFR BLD AUTO: 37.4 % (ref 35–47)
HGB BLD-MCNC: 12.7 G/DL (ref 11.7–15.7)
MCH RBC QN AUTO: 28.9 PG (ref 26.5–33)
MCHC RBC AUTO-ENTMCNC: 34 G/DL (ref 31.5–36.5)
MCV RBC AUTO: 85 FL (ref 78–100)
PLATELET # BLD AUTO: 225 10E3/UL (ref 150–450)
RBC # BLD AUTO: 4.39 10E6/UL (ref 3.8–5.2)
SPECIMEN EXP DATE BLD: NORMAL
SPECIMEN EXP DATE BLD: NORMAL
T PALLIDUM AB SER QL: NONREACTIVE
WBC # BLD AUTO: 9 10E3/UL (ref 4–11)

## 2025-05-14 PROCEDURE — 99207 PR NO BILLABLE SERVICE THIS VISIT: CPT

## 2025-05-14 NOTE — PROGRESS NOTES
Preceptor Attestation:   I discussed the patient with the resident and evaluated the patient in person. I have verified the content of the note, which accurately reflects my assessment of the patient and the plan of care.   at 13w6d for initial OB visits.  Hx of cerclage - referral placed for MFM.  Will follow with Dr. Eckert.     Supervising Physician:  Funmi Guthrie MD.

## 2025-05-14 NOTE — PROGRESS NOTES
Past Medical History     Do you have a history of any of the following medical conditions?    Condition No/Yes/Details   Hypertension No    Heart disease, mitral valve prolapse, rheumatic fever No    Asthma or another chronic lung disease No    An autoimmune disorder No    Kidney disease No    Frequent urinary tract infections No    Epilepsy, seizures, or spells No    Migraine headaches No    Stroke, loss of sensation/function, seizures, or other neuro problem No    Diabetes No    Thyroid problems or have you taken thyroid medication No    Hepatitis, liver disease, jaundice No    Blood clots, phlebitis, pulmonary embolism or varicose veins No    Excessive bleeding after surgery or dental work No    Do you have more bleeding than other women after a cut or a scratch? No    Anemia No    Blood transfusions No         Would you refuse a blood transfusion?       No   If yes, then ask next question. If no, skip next question.   Would you rather die than receive a blood transfusion? No    Breast problems No    Have you ever ? No pumped x's 1 month. Plans to breast feed.   Abnormalities of the uterus No    Abnormal pap smear No    Have you ever been treated for depression? No    Are you having problems with crying spells or loss of self-esteem? No    Have you ever required psychiatric care? No    Have you been physically, sexually, or emotionally hurt by someone? No    Have you been in a major accident or suffered serious trauma? No    Have you ever had any gynecological surgical procedures such as cervical conization, LEEP, laser treatment, cryosurgery of the cervix, or a dilatation and curettage?  YES had cerclage placed at 20 and  due to fetal distress   Have you had any other surgical procedures? No         Have you ever had any complications from anesthesia? No    Have you ever been hospitalized for a nonsurgical reason? No             Substance use and exposure     Does anyone in your home  smoke? No    Do you use tobacco products or betel nut? No    Prenatal Alcohol Screening:  Before you knew you were pregnant, how much alcohol (beer, wine, liquor) did you drink?    After you found out you were pregnant, how many times did you drink alcohol?    During your pregnancy, how many times did you have 4 or more drinks in a day? No    Do you use any of the following: marijuana, speed, cocaine, heroin, hallucinogens, or other drugs? No               Symptoms since Last Menstrual Period     Do you currently have any of the following symptoms: No/Yes/Details        Abdominal pain  YES some round ligament pain        Blood in the stools or urine No         Chest pain No         Shortness of breath No         coughing or vomiting up blood No         Your heart racing or skipping beats No         Nausea or vomiting No         Pain on urination No         Vaginal discharge or bleeding No                Genetic Screening          Is the patient 35 years or older? No      Do you have a history of any of the following No/Yes/Details        A metabolic disorder (e.g. Insulin-dependent DM, PKU) No         Recurrent pregnancy loss or still birth No      Do you, the baby's father, or anyone in your families have          Thalassemia AND MCV <80 No         Hemophilia No         Neural tube defect No }        Congenital heart defect No         Sickle cell disease or trait No         Muscular dystrophy No         Cystic fibrosis No         Mental retardation or autism No         Down's syndrome No         Darius-Sach's disease No         Cristal's chorea No         Any other inherited genetic or chromosomal disorder No         A child with birth defects not listed above No                Infection History     Ever treated for tuberculosis or had a positive skin test No    Ever had genital herpes (or has your partner) No    Had a rash or viral illness since LMP No    Ever had a sexually transmitted infection No    Ever  had chicken pox or the vaccine  YES immune by titer   Have you had a sexual partner who is HIV positive No    Ever had any other serious infectious disease No                Risk Assessment     Average Risk Category  No significant risk factors: Yes    At Risk Category (up to 3)  Teen pregnancy: No  Poor social situation: No  Domestic abuse: No  Financial difficulties: No  Smoker: No  H/O  deliver: Yes  H/O drug abuse: No  Non-English speaking: No  Advanced maternal age: No  GDM risks: No  Previous C/S: Yes  H/O PIH: No  H/O STIs: No  H/O mental health concerns: No  Onset care > 20 weeks: No  Other: hx cerclage     High Risk Category (4 or more At Risk or)  Diabetes/GDM: No  Multiple gestation: No  Chronic hypertension: No  Significant hx of asthma: No  Fetal demise > 20 weeks: No  Positive tox screen: No  Current mental health treatment: No      Risk: High Risk   Date determined: 25

## 2025-05-14 NOTE — PATIENT INSTRUCTIONS
Thank you for coming into clinic today!    Bring yellow slip downstairs to the lab and get labs collected  We will contact you with the results of your testing if there are abnormal results or changes in your care  Continue taking your prenatals  Remember to only use Tylenol for your pain. Avoid Ibuprofen.  Schedule appointment at  for 4 weeks for OB follow up  Call the clinic or [facility] Line below if you have you have any of the danger signs that we discussed including:     Increased abdominal pain  Loss of fluid  Vaginal bleeding  Decreased fetal movement (less than 10 movements within 2 hours)  Contractions (occuring every 5 minutes and lasting between 60 - 90 seconds)  Severe headache pain  Changes in vision  Sudden swelling in hands or feet     [facility]   [phone]  [facility], 2nd floor  [facility]  [phone]    Take care,  Felicitas Preciado MD

## 2025-05-15 ENCOUNTER — PRE VISIT (OUTPATIENT)
Dept: MATERNAL FETAL MEDICINE | Facility: CLINIC | Age: 25
End: 2025-05-15
Payer: COMMERCIAL

## 2025-05-15 LAB
BACTERIA UR CULT: NO GROWTH
C TRACH DNA SPEC QL NAA+PROBE: NEGATIVE
HBV SURFACE AB SERPL IA-ACNC: <3.5 M[IU]/ML
HBV SURFACE AB SERPL IA-ACNC: NONREACTIVE M[IU]/ML
HBV SURFACE AG SERPL QL IA: NONREACTIVE
HIV 1+2 AB+HIV1 P24 AG SERPL QL IA: NONREACTIVE
N GONORRHOEA DNA SPEC QL NAA+PROBE: NEGATIVE
SPECIMEN TYPE: NORMAL
SPECIMEN TYPE: NORMAL

## 2025-05-16 ENCOUNTER — OFFICE VISIT (OUTPATIENT)
Dept: MATERNAL FETAL MEDICINE | Facility: CLINIC | Age: 25
End: 2025-05-16
Attending: STUDENT IN AN ORGANIZED HEALTH CARE EDUCATION/TRAINING PROGRAM
Payer: COMMERCIAL

## 2025-05-16 ENCOUNTER — HOSPITAL ENCOUNTER (OUTPATIENT)
Dept: ULTRASOUND IMAGING | Facility: CLINIC | Age: 25
Discharge: HOME | End: 2025-05-16
Attending: STUDENT IN AN ORGANIZED HEALTH CARE EDUCATION/TRAINING PROGRAM
Payer: COMMERCIAL

## 2025-05-16 ENCOUNTER — RESULTS FOLLOW-UP (OUTPATIENT)
Dept: FAMILY MEDICINE | Facility: CLINIC | Age: 25
End: 2025-05-16

## 2025-05-16 VITALS — HEART RATE: 90 BPM | SYSTOLIC BLOOD PRESSURE: 113 MMHG | DIASTOLIC BLOOD PRESSURE: 73 MMHG

## 2025-05-16 DIAGNOSIS — Z98.891 HISTORY OF CESAREAN SECTION: ICD-10-CM

## 2025-05-16 DIAGNOSIS — O09.299 HISTORY OF CERVICAL CERCLAGE, CURRENTLY PREGNANT: Primary | ICD-10-CM

## 2025-05-16 DIAGNOSIS — O09.299 HISTORY OF CERVICAL CERCLAGE, CURRENTLY PREGNANT: ICD-10-CM

## 2025-05-16 DIAGNOSIS — Z98.890 HISTORY OF CERVICAL CERCLAGE, CURRENTLY PREGNANT: ICD-10-CM

## 2025-05-16 DIAGNOSIS — Z98.890 HISTORY OF CERVICAL CERCLAGE, CURRENTLY PREGNANT: Primary | ICD-10-CM

## 2025-05-16 DIAGNOSIS — Z36.89 ENCOUNTER FOR FETAL ANATOMIC SURVEY: ICD-10-CM

## 2025-05-16 DIAGNOSIS — O09.899 HISTORY OF PRETERM DELIVERY, CURRENTLY PREGNANT: ICD-10-CM

## 2025-05-16 PROCEDURE — 76805 OB US >/= 14 WKS SNGL FETUS: CPT

## 2025-05-16 PROCEDURE — 99213 OFFICE O/P EST LOW 20 MIN: CPT | Mod: 25 | Performed by: STUDENT IN AN ORGANIZED HEALTH CARE EDUCATION/TRAINING PROGRAM

## 2025-05-16 NOTE — NURSING NOTE
Patient presents to [facility] for 2/3/MFM consult at 14w1d due to history of cerclage and PTD. Denies LOF, vaginal bleeding, cramping/contractions. States LMP was an approximate date - states it began sometime between 2/4/25-2/8/25. DESTIN changed to 11/9/25 based on dating US. Cerclage scheduled for 5/21/25 at 1030am. Patient understands to arrive to LD at 0830 and remain NPO starting at midnight prior to the procedure. Map given. SBAR given to M MD, see their note in Epic.

## 2025-05-19 NOTE — PROGRESS NOTES
Maternal-Fetal Medicine Consultation    Tigre Ceballos  : 2000  MRN: [id number]    Thank-you for referring your patient for an early anatomic ultrasound and MFM consultation for history of exam indicated cerclage in prior pregnancy. She has not had genetic screening this pregnancy, genetic screening/testing options were reviewed which she is not interested in today. BP today 113/73 mmHg, HR 90. Medications include PNV. Please see separate documentation for today's complete US report.    I discussed the findings on today's ultrasound with the patient. I reviewed the limitations of ultrasound both in detecting aneuploidy and structural abnormalities.  Ultrasound can routinely detect 80-90% of structural abnormalities.    Tigre is known to MFM due to exam indicated cerclage at 21 weeks in prior pregnancy (4 mm). She underwent an exam indicated cerclage (modified Elo with Dr. Gallegos given minimal remaining cervix on exam). This was removed at 36 weeks gestation and she then had PPROM and  delivery via PCS for NRFHT.    Today we discussed the incidence and epidemiology of  birth (PTB).  There are multiple etiologies of spontaneous PTB, including but not limited to infection, bleeding, labor, cervical insufficiency, and PPROM.  However, most spontaneous  deliveries occur in patients with no risk factors.  A history of prior  delivery is a significant risk factor for recurrence in a subsequent pregnancy.  We discussed that recurrent  birth can happen at the same gestational age as a prior  birth but that it cannot be predicted and it could recur at an earlier gestational age.  We also discussed the concept of cervical insufficiency which is another cause of  birth and is classically described as painless cervical dilation.   is familiar with this diagnosis given exam indicated cerclage required in her last pregnancy for cervical insufficiency. Most patients with  cervical insufficiency undergoing expectant management will eventually develop symptoms, which may be pressure, back pain, cramping, leakage of fluid, vaginal bleeding and ultimately contractions.  Mid-trimester cervical dilation increases the risk of intra-amniotic infection which can then cause contractions and labor. We discussed management options including a prophylactic, history-indicated cerclage vs serial TVUS for cervical length screening. A prophylactic cerclage is typically placed at the end of the first trimester (12 to 14 weeks of gestation) to prevent recurrence of early  delivery. Most case series quote a viable birth rate of 70 to 90 percent after elective cerclage, as compared with 10 to 30 percent prior to the procedure, but these studies are marred by poor study design.  I also reviewed a systematic review, comparing prophylactic versus urgent (ultrasound-indicated) cerclage, which revealed a decrease in cerclage rates with similar pregnancy outcomes in utilizing an ultrasound-indicated approach. We then discussed the option of transvaginal ultrasounds for cervical length as a predictive tool.  Cervical length measurements are inversely and continuously related to risk of  birth. We follow cervical lengths weekly from 16 weeks through 23 weeks.  If shortening is noted (i.e. <25 mm) we would proceed with ultrasound-indicated cerclage.  Again we reviewed that this strategy may help her avoid a cerclage placement with data suggesting similar pregnancy outcomes.  We discussed that we do not place cerclages beyond 23 weeks gestation. After discussion of the above,  desires to proceed with history indicated cerclage, which will be scheduled next available given gestation already 14 weeks.    Recommendations:   History indicated cerclage to be scheduled next available   Comprehensive US with MFM at 18-20 weeks gestation   Administration of  corticosteroids if the patient is  deemed to be at increased risk of imminent  delivery.   Urine culture every trimester with aggressive treatment of bacteriuria.   Clinical evaluation of  labor symptoms.      Follow-up is scheduled here in three weeks for a comprehensive anatomic survey.     Return to primary provider for continued prenatal care.    If you have questions regarding today's evaluation or if we can be of further service, please contact the [facility].    **Fetal anomalies may be present but not detected**     I have seen and evaluated this patient independently. I personally reviewed the medical record and completed the above documentation. I spent a total of 45 minutes on the date of this encounter preparing to see the patient (reviewing medical records), obtaining the patient's history and exam, counseling and discussing the plan of care, documenting the visit in the electronic medical record, and communicating with other health care professionals and/or care coordination.     Romi Holman MD    Ob/Gyn  Maternal-Fetal Medicine

## 2025-05-20 ENCOUNTER — ANESTHESIA EVENT (OUTPATIENT)
Dept: OBGYN | Facility: CLINIC | Age: 25
End: 2025-05-20
Payer: COMMERCIAL

## 2025-05-20 PROBLEM — O09.899 HISTORY OF PRETERM DELIVERY, CURRENTLY PREGNANT: Status: ACTIVE | Noted: 2025-05-14

## 2025-05-20 PROBLEM — O34.30 CERVICAL INSUFFICIENCY IN PREGNANCY, ANTEPARTUM: Status: ACTIVE | Noted: 2025-05-14

## 2025-05-20 RX ORDER — OXYCODONE HYDROCHLORIDE 5 MG/1
10 TABLET ORAL
Refills: 0 | Status: CANCELLED | OUTPATIENT
Start: 2025-05-20

## 2025-05-20 RX ORDER — OXYCODONE HYDROCHLORIDE 5 MG/1
5 TABLET ORAL
Refills: 0 | Status: CANCELLED | OUTPATIENT
Start: 2025-05-20

## 2025-05-20 RX ORDER — ONDANSETRON 2 MG/ML
4 INJECTION INTRAMUSCULAR; INTRAVENOUS EVERY 30 MIN PRN
Status: CANCELLED | OUTPATIENT
Start: 2025-05-20

## 2025-05-20 RX ORDER — NALOXONE HYDROCHLORIDE 0.4 MG/ML
0.1 INJECTION, SOLUTION INTRAMUSCULAR; INTRAVENOUS; SUBCUTANEOUS
Status: CANCELLED | OUTPATIENT
Start: 2025-05-20

## 2025-05-20 RX ORDER — ONDANSETRON 4 MG/1
4 TABLET, ORALLY DISINTEGRATING ORAL EVERY 30 MIN PRN
Status: CANCELLED | OUTPATIENT
Start: 2025-05-20

## 2025-05-20 RX ORDER — DEXAMETHASONE SODIUM PHOSPHATE 4 MG/ML
4 INJECTION, SOLUTION INTRA-ARTICULAR; INTRALESIONAL; INTRAMUSCULAR; INTRAVENOUS; SOFT TISSUE
Status: CANCELLED | OUTPATIENT
Start: 2025-05-20

## 2025-05-20 NOTE — ANESTHESIA PREPROCEDURE EVALUATION
"Anesthesia Pre-Procedure Evaluation    Patient: Tigre ORTEGA Lin   MRN: [id number] : 2000          Procedure : Procedure(s):  CERCLAGE, CERVIX, VAGINAL APPROACH         Past Medical History:   Diagnosis Date    Cervical incompetence       Past Surgical History:   Procedure Laterality Date    CERCLAGE CERVICAL N/A 10/25/2021    Procedure: CERCLAGE, CERVIX, VAGINAL APPROACH;  Surgeon: Patrick Gallegos MD;  Location:  L+D     SECTION N/A 2022    Procedure:  SECTION;  Surgeon: Gerry Bergeron MD;  Location: [facility] OR      No Known Allergies   Social History     Tobacco Use    Smoking status: Never    Smokeless tobacco: Never   Substance Use Topics    Alcohol use: Never      Wt Readings from Last 1 Encounters:   25 64.9 kg (143 lb)        Anesthesia Evaluation   Pt has had prior anesthetic. Type of anesthetic: Spinal.        ROS/MED HX  ENT/Pulmonary:       Neurologic:       Cardiovascular:       METS/Exercise Tolerance:     Hematologic:       Musculoskeletal:       GI/Hepatic:       Renal/Genitourinary:       Endo:       Psychiatric/Substance Use:       Infectious Disease:       Malignancy:       Other:    @ 15w2d    Hx of cerclage placement for cervical incompetence in prior pregnancy (delivered via c/s at 36+3 for fetal distress)  (-) previous  and TOLAC candidate         Physical Exam  Airway  Mallampati: II  TM distance: > 3 FB  Neck ROM: full    Cardiovascular - normal exam   Dental - normal exam    Pulmonary - normal exam      Neurological   Other Findings       OUTSIDE LABS:  CBC:   Lab Results   Component Value Date    WBC 9.0 2025    WBC 11.4 (H) 2022    HGB 12.7 2025    HGB 10.1 (L) 2022    HCT 37.4 2025    HCT 31.3 (L) 2022     2025     2022     BMP: No results found for: \"NA\", \"POTASSIUM\", \"CHLORIDE\", \"CO2\", \"BUN\", \"CR\", \"GLC\"  COAGS: No results found for: \"PTT\", \"INR\", \"FIBR\"  POC: " "  Lab Results   Component Value Date    HCG Positive (A) 05/02/2025     HEPATIC: No results found for: \"ALBUMIN\", \"PROTTOTAL\", \"ALT\", \"AST\", \"GGT\", \"ALKPHOS\", \"BILITOTAL\", \"BILIDIRECT\", \"MAXI\"  OTHER:   Lab Results   Component Value Date    A1C 4.9 05/14/2025       Anesthesia Plan    ASA Status:  2       Anesthesia Type: Spinal.        Consents    Anesthesia Plan(s) and associated risks, benefits, and realistic alternatives discussed. Questions answered and patient/representative(s) expressed understanding.     - Discussed:     - Discussed with:  Patient               Postoperative Care         Comments:                   Devyn AYALA Ma, MD    I have reviewed the pertinent notes and labs in the chart from the past 30 days and (re)examined the patient.  Any updates or changes from those notes are reflected in this note.    Clinically Significant Risk Factors Present on Admission                                              "

## 2025-05-20 NOTE — NURSING NOTE
Family Medicine OB Education    I provided the following OB education to Tigre Ceballos.    Discussed that Dr Eckert should be the doctor that she sees for her prenatal visits and that provider will try hard to be the one to deliver her baby.  Discussed that if primary provider was unavailable that one of our other physicians would deliver the baby and that this could be a male or female provider.  Briefly discussed residency program and that multiple doctors would be present at time of delivery.  Sheet given and discussed fetal growth and development.  Sheet given and discussed warning signs with reasons to call clinic or L&D with questions or concerns (phone numbers given).  Sheet given and discussed Tdap to be given after 27 weeks gestation and the importance of family members get immunized before delivery.  Proof of pregnancy completed and given to pt.  Gave pt preregistration form for hospital to complete.  See questionnaire and pregnancy risk assessment for further information in provider encounter.           Name of provider who requested the OB education: Dr Preciado  Name of provider on site (faculty or community preceptor) at the time of performing the OB education: Dr Cleveland Clark, RN, BSN

## 2025-05-21 ENCOUNTER — ANESTHESIA (OUTPATIENT)
Dept: OBGYN | Facility: CLINIC | Age: 25
End: 2025-05-21
Payer: COMMERCIAL

## 2025-05-21 ENCOUNTER — HOSPITAL ENCOUNTER (OUTPATIENT)
Facility: CLINIC | Age: 25
Discharge: HOME | End: 2025-05-21
Attending: OBSTETRICS & GYNECOLOGY | Admitting: OBSTETRICS & GYNECOLOGY
Payer: COMMERCIAL

## 2025-05-21 VITALS
HEART RATE: 84 BPM | WEIGHT: 144 LBS | SYSTOLIC BLOOD PRESSURE: 117 MMHG | HEIGHT: 63 IN | BODY MASS INDEX: 25.52 KG/M2 | RESPIRATION RATE: 16 BRPM | TEMPERATURE: 98.4 F | DIASTOLIC BLOOD PRESSURE: 69 MMHG | OXYGEN SATURATION: 100 %

## 2025-05-21 PROBLEM — O34.31 CERVICAL INSUFFICIENCY DURING PREGNANCY IN FIRST TRIMESTER, ANTEPARTUM: Status: ACTIVE | Noted: 2025-05-21

## 2025-05-21 PROBLEM — O34.32 CERVICAL INSUFFICIENCY DURING PREGNANCY IN SECOND TRIMESTER, ANTEPARTUM: Status: ACTIVE | Noted: 2025-05-21

## 2025-05-21 PROBLEM — Z3A.15 15 WEEKS GESTATION OF PREGNANCY: Status: ACTIVE | Noted: 2025-05-21

## 2025-05-21 LAB
ABO + RH BLD: NORMAL
ALBUMIN UR-MCNC: NEGATIVE MG/DL
APPEARANCE UR: CLEAR
BASOPHILS # BLD AUTO: 0 10E3/UL (ref 0–0.2)
BASOPHILS NFR BLD AUTO: 1 %
BILIRUB UR QL STRIP: NEGATIVE
BLD GP AB SCN SERPL QL: NEGATIVE
COLOR UR AUTO: ABNORMAL
EOSINOPHIL # BLD AUTO: 0.1 10E3/UL (ref 0–0.7)
EOSINOPHIL NFR BLD AUTO: 1 %
ERYTHROCYTE [DISTWIDTH] IN BLOOD BY AUTOMATED COUNT: 12.2 % (ref 10–15)
GLUCOSE UR STRIP-MCNC: NEGATIVE MG/DL
HCT VFR BLD AUTO: 35.8 % (ref 35–47)
HGB BLD-MCNC: 12.4 G/DL (ref 11.7–15.7)
HGB UR QL STRIP: NEGATIVE
IMM GRANULOCYTES # BLD: 0 10E3/UL
IMM GRANULOCYTES NFR BLD: 0 %
KETONES UR STRIP-MCNC: NEGATIVE MG/DL
LEUKOCYTE ESTERASE UR QL STRIP: NEGATIVE
LYMPHOCYTES # BLD AUTO: 1.9 10E3/UL (ref 0.8–5.3)
LYMPHOCYTES NFR BLD AUTO: 22 %
MCH RBC QN AUTO: 29.5 PG (ref 26.5–33)
MCHC RBC AUTO-ENTMCNC: 34.6 G/DL (ref 31.5–36.5)
MCV RBC AUTO: 85 FL (ref 78–100)
MONOCYTES # BLD AUTO: 0.6 10E3/UL (ref 0–1.3)
MONOCYTES NFR BLD AUTO: 7 %
MUCOUS THREADS #/AREA URNS LPF: PRESENT /LPF
NEUTROPHILS # BLD AUTO: 5.9 10E3/UL (ref 1.6–8.3)
NEUTROPHILS NFR BLD AUTO: 70 %
NITRATE UR QL: NEGATIVE
NRBC # BLD AUTO: 0 10E3/UL
NRBC BLD AUTO-RTO: 0 /100
PH UR STRIP: 7.5 [PH] (ref 5–7)
PLATELET # BLD AUTO: 227 10E3/UL (ref 150–450)
RBC # BLD AUTO: 4.21 10E6/UL (ref 3.8–5.2)
RBC URINE: <1 /HPF
SP GR UR STRIP: 1.01 (ref 1–1.03)
SPECIMEN EXP DATE BLD: NORMAL
SQUAMOUS EPITHELIAL: 1 /HPF
UROBILINOGEN UR STRIP-MCNC: NORMAL MG/DL
WBC # BLD AUTO: 8.5 10E3/UL (ref 4–11)
WBC URINE: <1 /HPF

## 2025-05-21 PROCEDURE — 85004 AUTOMATED DIFF WBC COUNT: CPT

## 2025-05-21 PROCEDURE — 250N000013 HC RX MED GY IP 250 OP 250 PS 637

## 2025-05-21 PROCEDURE — 86900 BLOOD TYPING SEROLOGIC ABO: CPT

## 2025-05-21 PROCEDURE — 258N000003 HC RX IP 258 OP 636

## 2025-05-21 PROCEDURE — 59320 REVISION OF CERVIX: CPT | Mod: GC | Performed by: OBSTETRICS & GYNECOLOGY

## 2025-05-21 PROCEDURE — 258N000003 HC RX IP 258 OP 636: Performed by: STUDENT IN AN ORGANIZED HEALTH CARE EDUCATION/TRAINING PROGRAM

## 2025-05-21 PROCEDURE — 250N000011 HC RX IP 250 OP 636: Performed by: ANESTHESIOLOGY

## 2025-05-21 PROCEDURE — 81003 URINALYSIS AUTO W/O SCOPE: CPT

## 2025-05-21 PROCEDURE — 370N000017 HC ANESTHESIA TECHNICAL FEE, PER MIN: Performed by: OBSTETRICS & GYNECOLOGY

## 2025-05-21 PROCEDURE — 360N000074 HC SURGERY LEVEL 1, PER MIN: Performed by: OBSTETRICS & GYNECOLOGY

## 2025-05-21 PROCEDURE — 999N000141 HC STATISTIC PRE-PROCEDURE NURSING ASSESSMENT: Performed by: OBSTETRICS & GYNECOLOGY

## 2025-05-21 PROCEDURE — 250N000011 HC RX IP 250 OP 636: Performed by: STUDENT IN AN ORGANIZED HEALTH CARE EDUCATION/TRAINING PROGRAM

## 2025-05-21 PROCEDURE — 710N000010 HC RECOVERY PHASE 1, LEVEL 2, PER MIN: Performed by: OBSTETRICS & GYNECOLOGY

## 2025-05-21 RX ORDER — SODIUM CHLORIDE, SODIUM LACTATE, POTASSIUM CHLORIDE, CALCIUM CHLORIDE 600; 310; 30; 20 MG/100ML; MG/100ML; MG/100ML; MG/100ML
INJECTION, SOLUTION INTRAVENOUS CONTINUOUS
Status: DISCONTINUED | OUTPATIENT
Start: 2025-05-21 | End: 2025-05-21 | Stop reason: HOSPADM

## 2025-05-21 RX ORDER — NALOXONE HYDROCHLORIDE 0.4 MG/ML
0.4 INJECTION, SOLUTION INTRAMUSCULAR; INTRAVENOUS; SUBCUTANEOUS
Status: DISCONTINUED | OUTPATIENT
Start: 2025-05-21 | End: 2025-05-21 | Stop reason: HOSPADM

## 2025-05-21 RX ORDER — NALOXONE HYDROCHLORIDE 0.4 MG/ML
0.2 INJECTION, SOLUTION INTRAMUSCULAR; INTRAVENOUS; SUBCUTANEOUS
Status: DISCONTINUED | OUTPATIENT
Start: 2025-05-21 | End: 2025-05-21 | Stop reason: HOSPADM

## 2025-05-21 RX ORDER — METOCLOPRAMIDE 10 MG/1
10 TABLET ORAL EVERY 6 HOURS PRN
Status: DISCONTINUED | OUTPATIENT
Start: 2025-05-21 | End: 2025-05-21 | Stop reason: HOSPADM

## 2025-05-21 RX ORDER — METOCLOPRAMIDE HYDROCHLORIDE 5 MG/ML
10 INJECTION INTRAMUSCULAR; INTRAVENOUS EVERY 6 HOURS PRN
Status: DISCONTINUED | OUTPATIENT
Start: 2025-05-21 | End: 2025-05-21 | Stop reason: HOSPADM

## 2025-05-21 RX ORDER — SODIUM CHLORIDE, SODIUM LACTATE, POTASSIUM CHLORIDE, CALCIUM CHLORIDE 600; 310; 30; 20 MG/100ML; MG/100ML; MG/100ML; MG/100ML
INJECTION, SOLUTION INTRAVENOUS CONTINUOUS PRN
Status: DISCONTINUED | OUTPATIENT
Start: 2025-05-21 | End: 2025-05-21

## 2025-05-21 RX ORDER — ONDANSETRON 2 MG/ML
4 INJECTION INTRAMUSCULAR; INTRAVENOUS EVERY 6 HOURS PRN
Status: DISCONTINUED | OUTPATIENT
Start: 2025-05-21 | End: 2025-05-21 | Stop reason: HOSPADM

## 2025-05-21 RX ORDER — SODIUM CHLORIDE, SODIUM LACTATE, POTASSIUM CHLORIDE, CALCIUM CHLORIDE 600; 310; 30; 20 MG/100ML; MG/100ML; MG/100ML; MG/100ML
INJECTION, SOLUTION INTRAVENOUS CONTINUOUS PRN
Status: DISCONTINUED | OUTPATIENT
Start: 2025-05-21 | End: 2025-05-21 | Stop reason: HOSPADM

## 2025-05-21 RX ORDER — ACETAMINOPHEN 325 MG/1
975 TABLET ORAL ONCE
Status: COMPLETED | OUTPATIENT
Start: 2025-05-21 | End: 2025-05-21

## 2025-05-21 RX ORDER — LIDOCAINE 40 MG/G
CREAM TOPICAL
Status: DISCONTINUED | OUTPATIENT
Start: 2025-05-21 | End: 2025-05-21 | Stop reason: HOSPADM

## 2025-05-21 RX ORDER — CITRIC ACID/SODIUM CITRATE 334-500MG
30 SOLUTION, ORAL ORAL ONCE
Status: COMPLETED | OUTPATIENT
Start: 2025-05-21 | End: 2025-05-21

## 2025-05-21 RX ORDER — FENTANYL CITRATE-0.9 % NACL/PF 10 MCG/ML
100 PLASTIC BAG, INJECTION (ML) INTRAVENOUS EVERY 5 MIN PRN
Status: DISCONTINUED | OUTPATIENT
Start: 2025-05-21 | End: 2025-05-21 | Stop reason: HOSPADM

## 2025-05-21 RX ORDER — NALBUPHINE HYDROCHLORIDE 10 MG/ML
2.5-5 INJECTION INTRAMUSCULAR; INTRAVENOUS; SUBCUTANEOUS EVERY 6 HOURS PRN
Status: DISCONTINUED | OUTPATIENT
Start: 2025-05-21 | End: 2025-05-21 | Stop reason: HOSPADM

## 2025-05-21 RX ORDER — BUPIVACAINE HYDROCHLORIDE 7.5 MG/ML
INJECTION, SOLUTION INTRASPINAL
Status: COMPLETED | OUTPATIENT
Start: 2025-05-21 | End: 2025-05-21

## 2025-05-21 RX ORDER — ONDANSETRON 4 MG/1
4 TABLET, ORALLY DISINTEGRATING ORAL EVERY 6 HOURS PRN
Status: DISCONTINUED | OUTPATIENT
Start: 2025-05-21 | End: 2025-05-21 | Stop reason: HOSPADM

## 2025-05-21 RX ORDER — MORPHINE SULFATE 1 MG/ML
150 INJECTION, SOLUTION EPIDURAL; INTRATHECAL; INTRAVENOUS ONCE
Status: DISCONTINUED | OUTPATIENT
Start: 2025-05-21 | End: 2025-05-21 | Stop reason: HOSPADM

## 2025-05-21 RX ORDER — PROCHLORPERAZINE MALEATE 10 MG
10 TABLET ORAL EVERY 6 HOURS PRN
Status: DISCONTINUED | OUTPATIENT
Start: 2025-05-21 | End: 2025-05-21 | Stop reason: HOSPADM

## 2025-05-21 RX ADMIN — SODIUM CHLORIDE, SODIUM LACTATE, POTASSIUM CHLORIDE, AND CALCIUM CHLORIDE: .6; .31; .03; .02 INJECTION, SOLUTION INTRAVENOUS at 09:33

## 2025-05-21 RX ADMIN — SODIUM CITRATE AND CITRIC ACID MONOHYDRATE 30 ML: 500; 334 SOLUTION ORAL at 09:49

## 2025-05-21 RX ADMIN — SODIUM CHLORIDE, SODIUM LACTATE, POTASSIUM CHLORIDE, AND CALCIUM CHLORIDE: .6; .31; .03; .02 INJECTION, SOLUTION INTRAVENOUS at 09:55

## 2025-05-21 RX ADMIN — BUPIVACAINE HYDROCHLORIDE IN DEXTROSE 1.2 ML: 7.5 INJECTION, SOLUTION SUBARACHNOID at 10:00

## 2025-05-21 RX ADMIN — ACETAMINOPHEN 975 MG: 325 TABLET ORAL at 09:48

## 2025-05-21 RX ADMIN — PHENYLEPHRINE HYDROCHLORIDE 50 MCG/MIN: 10 INJECTION INTRAVENOUS at 10:13

## 2025-05-21 ASSESSMENT — ACTIVITIES OF DAILY LIVING (ADL)
ADLS_ACUITY_SCORE: 20

## 2025-05-21 NOTE — ANESTHESIA CARE TRANSFER NOTE
Patient: Tigre Ceballos    Procedure: Procedure(s):  CERCLAGE, CERVIX, VAGINAL APPROACH       Diagnosis: Hx of cerclage, currently pregnant [O09.299, Z98.890]  Diagnosis Additional Information: No value filed.    Anesthesia Type:   Spinal     Note:    Oropharynx: oropharynx clear of all foreign objects and spontaneously breathing  Level of Consciousness: awake  Oxygen Supplementation: room air    Independent Airway: airway patency satisfactory and stable  Dentition: dentition unchanged  Vital Signs Stable: post-procedure vital signs reviewed and stable  Report to RN Given: handoff report given  Patient transferred to: PACU    Handoff Report: Identifed the Patient, Identified the Reponsible Provider, Reviewed the pertinent medical history, Discussed the surgical course, Reviewed Intra-OP anesthesia mangement and issues during anesthesia, Set expectations for post-procedure period and Allowed opportunity for questions and acknowledgement of understanding      Vitals:  Vitals Value Taken Time   BP 98/53 05/21/25 10:50   Temp     Pulse 97 05/21/25 10:54   Resp 17 05/21/25 10:54   SpO2 100 % 05/21/25 10:54   Vitals shown include unfiled device data.    Electronically Signed By: Mallorie Mello MD  May 21, 2025  10:55 AM

## 2025-05-21 NOTE — PLAN OF CARE
Pt ready for discharge. Has met all goals. Discharge instructions given to pt. Discussed when to call or come back to hospital. Pt and her  agree with plan. Will follow up next week with her OB team. Discharged home at 1525.

## 2025-05-21 NOTE — DISCHARGE INSTRUCTIONS
Learning About When to Call Your Doctor During Pregnancy (Up to 20 Weeks)  Overview     It's common to have concerns about what might be a problem during your pregnancy. Most pregnancies don't have any serious problems. But it's still important to know when to call your doctor if you have certain symptoms.  These are general suggestions. Your doctor may give you some more information about when to call.  When to call your doctor (up to 20 weeks)  Call 911 anytime you think you may need emergency care. For example, call if:  You have severe vaginal bleeding. This means you are soaking through a pad each hour for 2 or more hours.  You have chest pain, are short of breath, or cough up blood.  You have sudden, severe pain in your belly.  You passed out (lost consciousness).  Call your doctor now or seek immediate medical care if:  You have a fever.  You have vaginal bleeding.  You are dizzy or lightheaded, or you feel like you may faint.  You have signs of a blood clot in your leg (called a deep vein thrombosis), such as:  Pain in the calf, back of the knee, thigh, or groin.  Swelling in your leg or groin.  A color change on the leg or groin. The skin may be reddish or purplish, depending on your usual skin color.  You have symptoms of a urinary tract infection. These may include:  Pain or burning when you urinate.  A frequent need to urinate without being able to pass much urine.  Pain in the flank, which is just below the rib cage and above the waist on either side of the back.  Blood in your urine.  You have belly pain.  You think you are having contractions.  Watch closely for changes in your health, and be sure to contact your doctor if:  You have vaginal discharge that smells bad.  You feel sad, anxious, or hopeless for more than a few days.  You have other concerns about your pregnancy.  Follow-up care is a key part of your treatment and safety. Be sure to make and go to all appointments, and call your doctor  "if you are having problems. It's also a good idea to know your test results and keep a list of the medicines you take.  Where can you learn more?  Go to https://www.[employer].net/patiented  Enter G674 in the search box to learn more about \"Learning About When to Call Your Doctor During Pregnancy (Up to 20 Weeks).\"  Current as of: April 30, 2024  Content Version: 14.4    4452-0391 [employer].   Care instructions adapted under license by your healthcare professional. If you have questions about a medical condition or this instruction, always ask your healthcare professional. [employer] disclaims any warranty or liability for your use of this information.    "

## 2025-05-21 NOTE — OP NOTE
Operative Note: Cervical Cerclage         Pre-Op Diagnosis:   1) Single intrauterine pregnancy at 15w3d by LMP = 13w2d   2) Cervical insufficiency by history of requiring an exam indicated cerclage at 21 weeks in her first pregnancy          Post-Op Diagnosis:   1) Same         Procedure:   1) History indicated Ramos cervical cerclage, 1 Mersilene suture, 1 blue prolene tag suture (knot tied at 12 o'clock position)         Surgeons:   Attending: Lawrence Kaur MD  Fellow: Cate Key MD, M Fellow  Medical Student: Alicia Sanchez MS4          Anesthesia:   Spinal          Estimated Blood Loss:   10cc         Findings:   1) Cervix was closed prior to the procedure, closed following the procedure  2) Fetal heart tones confirmed by doptone following the procedure         Specimens:   1) None         Complications:   1) None apparent          History:     Tigre Ceballos is a 24 year old  at 15w3d by LMP = 13w2d.  She presented with history of requiring an exam indicated cerclage at 21 weeks in her first pregnancy when she was found to have a 4mm CL on anatomy ultrasound and was 1cm dilated. She had a modified shirodkar cerclage placed at that time. She then progressed to 36w gestation when it was removed. She then had PPROM and a primary CS for NRFHT.  After counseling regarding these findings, the patient has decided to proceed with history indicated transvaginal Ramos cervical cerclage.         Details of Procedure:   After administration of spinal anesthesia the patient was placed in the dorsal lithotomy position and prepped and draped in the usual fashion. A formal time out was completed. The vagina and cervix were copiously cleansed with betadine solution. The bladder was drained of clear yellow urine. A weighted speculum was placed into the vagina and Briesky retractors were used to visualize the cervix.     The anterior lip of the cervix was grasped with a ring forcep.  A circumferential suture of  Mersilene tape was placed in the usual fashion at the cervicovaginal reflection with knot tied at 12 o'clock position.  A blue Prolene tag suture was placed after the first knot of Mersilene was thrown. The suture was securely tied down and digital exam confirmed that the cervix was closed. The Prolene suture was tied down in an air-knot fashion for easier removal.     The bladder was again drained of clear urine and a rectal exam confirmed that no sutures were present in the rectum.    The cervix and vaginal vault were inspected and noted to be free of injury and hemostatic.      The instruments were removed from the vagina. She tolerated her spinal anesthesia well without incident. She was subsequently transferred to the recovery room in satisfactory condition.    Sponge and needle counts were correct at the close of the case x 2.    Dr. Kaur was present and scrubbed throughout the entire procedure.     Cate Key MD  Maternal Fetal Medicine Fellow  5/21/2025 7:35 PM

## 2025-05-21 NOTE — ANESTHESIA PROCEDURE NOTES
"Intrathecal injection Procedure Note    Pre-Procedure   Staff -        Anesthesiologist:  Thu Quintero MD       Resident/Fellow: Mallorie Mello MD       Performed By: anesthesiologist       Location: OB       Procedure Start/Stop Times: 5/21/2025 10:00 AM and 5/21/2025 10:13 AM       Pre-Anesthestic Checklist: patient identified, IV checked, risks and benefits discussed, informed consent, monitors and equipment checked, pre-op evaluation, at physician/surgeon's request and post-op pain management  Timeout:       Correct Patient: Yes        Correct Procedure: Yes        Correct Site: Yes        Correct Position: Yes   Procedure Documentation  Procedure: intrathecal injection         Patient Position: sitting       Patient Prep/Sterile Barriers: sterile gloves, mask, patient draped       Skin prep: Chloraprep       Insertion Site: L3-4. (midline approach).       Needle Gauge: 25.        Needle Length (Inches): 3.5        Spinal Needle Type: Pencan       Introducer used       Introducer: 20 G       # of attempts: 2 and  # of redirects:  2    Assessment/Narrative         Paresthesias: Resolved.       Sensory Level: T6       CSF fluid: clear.       Opening pressure was cmH2O while  Sitting.      Medication(s) Administered   0.75% Hyperbaric Bupivacaine (Intrathecal) - Intrathecal   1.2 mL - 5/21/2025 10:00:00 AM  Medication Administration Time: 5/21/2025 10:00 AM      FOR [facility] ([facility]) ONLY:   Pain Team Contact information: please page the Pain Team Via [employer]. Search \"Pain\". During daytime hours, please page the attending first. At night please page the resident first.      "

## 2025-05-21 NOTE — LETTER
[facility]  [address]  [place] [address]  Phone: [phone]    May 21, 2025        Tigre Ceballos  [address] [place] [address]          To whom it may concern:    RE: Tigre Ceballos was seen and treated today at our clinic. Therefore she was absent at work today (5/21/2025) and should be excused from work tomorrow (5/22/2025) for recovery.      Please contact me for questions or concerns.      Sincerely,      Dr. Debbie Brown MD

## 2025-05-21 NOTE — H&P
[facility]  OB History and Physical      Tigre Ceballos MRN# [id number]   Age: 24 year old YOB: 2000     CC: Here for scheduled cerclage    HPI:  Ms. Tigre Ceballos is a 24 year old  at 15w3d by LMP = 13w2d, who presents for history indicated cervical cerclage.  She denies contractions, vaginal bleeding, and loss of fluid.     She has a history of requiring an exam indicated cerclage at 21 weeks in her first pregnancy when she was found to have a 4mm CL on anatomy ultrasound and was 1cm dilated. She had a modified shirodkar cerclage placed at that time. She then progressed to 36w gestation when it was removed. She then had PPROM and a primary CS for NRFHT.     She desires to forgo cervical length screening this pregnancy and proceed with a history indicated cerclage.    Pregnancy Complications:  1.  History of cervical insufficiency by dilated cervix on anatomy exam in G1   2.  1x  section for NRFHT at 36w  3.  History of PPROM after 36w cerclage removal in her G1     Prenatal Labs:   Lab Results   Component Value Date    AS Negative 2025    HEPBANG Nonreactive 2025    CHPCRT Negative 2025    GCPCRT Negative 2025    HGB 12.4 2025       OB History  OB History    Para Term  AB Living   2 1 0 1 0 1   SAB IAB Ectopic Multiple Live Births   0 0 0 0 1      # Outcome Date GA Lbr Hemanth/2nd Weight Sex Type Anes PTL Lv   2 Current            1  22 36w3d  2.722 kg (6 lb) M CS-LTranv Spinal N NETTA      Birth Comments: c-sect for fetal distress      Complications: Fetal Intolerance      Name: Bryn Ceballos      Apgar1: 9  Apgar5: 9       PMHx:   Past Medical History:   Diagnosis Date    Cervical incompetence      PSHx:   Past Surgical History:   Procedure Laterality Date    CERCLAGE CERVICAL N/A 10/25/2021    Procedure: CERCLAGE, CERVIX, VAGINAL APPROACH;  Surgeon: Patrick Gallegos MD;  Location: [facility] L+D     SECTION N/A  "2022    Procedure:  SECTION;  Surgeon: Gerry Bergeron MD;  Location: [facility] OR     Meds:   Medications Prior to Admission   Medication Sig Dispense Refill Last Dose/Taking    Prenatal Vit-DSS-Fe Cbn-FA (PRENATAL AD PO) Take by mouth.   Past Week     Allergies:  No Known Allergies   FmHx:   Family History   Problem Relation Age of Onset    Hypertension Mother     Diabetes Mother     Cancer Father     Heart Disease No family hx of      SocHx: She denies any tobacco, alcohol, or other drug use during this pregnancy.    PE:  Vit: Patient Vitals for the past 4 hrs:   BP Height Weight   25 0856 -- 1.588 m (5' 2.5\") 65.3 kg (144 lb)   25 0855 109/65 -- --      Gen: Well-appearing, NAD, comfortable   CV: Well perfused   Pulm: Breathing comfortably on room air    Abd: Soft, gravid, non-tender  Ext: LE edema b/l       FHT: 145 bpm by RN doppler prior to procedure     Assessment/Plan  Tigre Ceballos is a 24 year old  female at 15w3d by LMP = 13w2d, who presents for history indicated cervical cerclage. Her pregnancy has otherwise complicated by: history of prior PPROM and primary CS in her G1 After exam indicated cerclage was removed at 36w.    History indicated Cerclage   was previously counseled that there are two main strategies for management this pregnancy given her history: placement of an elective, early (<14 weeks of gestation) history indicated cerclage, or cerclage placement only when ultrasound demonstrates cervical shortening. The risks, benefits, limitations and alternatives of each approach. It was discussed that a history-indicated cerclage is typically placed at the end of the first trimester (13 to 14 weeks of gestation). In patients who choose serial cervical length screening we typically monitor the cervical length via ultrasound every 2 weeks from 16-23 weeks and reserve cerclage placement for patients with a cervical length less than 25 mm. This approach may " avoid unnecessary cerclage placement in up to 50% of patients. Today we reviewed that since she required an exam indicated cerclage in her first pregnancy, we would recommend proceeding with a history indicated cerclage. We discussed the risk of bleeding, infection, or possible damage to surrounding organs. She was also counseled on the up to 80% chance of success rate of pregnancy prolongation up to planned cerclage removal time at 36 weeks when one is placed in a history indicated setting. However, there is a possibility of placing the cerclage and it ultimately not preventing a previable, periviable, or  birth. After counseling she would like to proceed with cerclage placement.     - Discussed risks of a cerclage including but not limited to: bleeding (including placental), infection (including chorioamnionitis), damage to surrounding structures including but not limited to bowel, bladder, cervix, risk of PPROM, failure of cerclage to prevent pre-viable, lion-viable or  birth. Reviewed that usually the cerclage is removed at 36 weeks unless indicated sooner. If she desires a repeat C/S, it is also reasonable to remove the cerclage at time of C/S if she does not have any PPROM/PTL symptoms   - Doptones before and after the procedure today   - Surgical and blood consents signed  - NPO at this time  - Anesthesia alerted  - CBC and T&S  - U/A to be collected in OR prior to procedure   - Discussed post-surgery expectations and recovery. Plan for discharge yo home today after her cerclage if eating/drinking, voiding, bleeding and pain is controlled.     The patient was discussed with Dr. Kaur who is in agreement with the treatment plan.    Cate Key MD  Maternal Fetal Medicine Fellow  9:07 AM

## 2025-05-21 NOTE — PLAN OF CARE
Pt here for history indicated cerclage. Prepped for surgery. . Procedure went well. Pt stable post cerclage.

## 2025-05-22 PROBLEM — Z3A.15 15 WEEKS GESTATION OF PREGNANCY: Status: RESOLVED | Noted: 2025-05-21 | Resolved: 2025-05-22

## 2025-05-22 PROBLEM — O34.32 CERVICAL INSUFFICIENCY DURING PREGNANCY IN SECOND TRIMESTER, ANTEPARTUM: Status: RESOLVED | Noted: 2025-05-21 | Resolved: 2025-05-22

## 2025-05-22 PROBLEM — O34.30 CERVICAL CERCLAGE SUTURE PRESENT, ANTEPARTUM: Status: ACTIVE | Noted: 2025-05-21

## 2025-06-18 ENCOUNTER — OFFICE VISIT (OUTPATIENT)
Dept: MATERNAL FETAL MEDICINE | Facility: HOSPITAL | Age: 25
End: 2025-06-18
Attending: OBSTETRICS & GYNECOLOGY
Payer: COMMERCIAL

## 2025-06-18 ENCOUNTER — OFFICE VISIT (OUTPATIENT)
Dept: FAMILY MEDICINE | Facility: CLINIC | Age: 25
End: 2025-06-18
Payer: COMMERCIAL

## 2025-06-18 ENCOUNTER — ANCILLARY PROCEDURE (OUTPATIENT)
Dept: ULTRASOUND IMAGING | Facility: HOSPITAL | Age: 25
End: 2025-06-18
Attending: OBSTETRICS & GYNECOLOGY
Payer: COMMERCIAL

## 2025-06-18 VITALS
RESPIRATION RATE: 20 BRPM | OXYGEN SATURATION: 98 % | BODY MASS INDEX: 28.7 KG/M2 | HEIGHT: 61 IN | TEMPERATURE: 97.9 F | DIASTOLIC BLOOD PRESSURE: 80 MMHG | WEIGHT: 152 LBS | SYSTOLIC BLOOD PRESSURE: 120 MMHG | HEART RATE: 99 BPM

## 2025-06-18 DIAGNOSIS — O09.299 HISTORY OF CERVICAL CERCLAGE, CURRENTLY PREGNANT: ICD-10-CM

## 2025-06-18 DIAGNOSIS — Z36.89 ENCOUNTER FOR FETAL ANATOMIC SURVEY: Primary | ICD-10-CM

## 2025-06-18 DIAGNOSIS — Z98.891 HISTORY OF CESAREAN SECTION: ICD-10-CM

## 2025-06-18 DIAGNOSIS — Z98.890 HISTORY OF CERVICAL CERCLAGE, CURRENTLY PREGNANT: ICD-10-CM

## 2025-06-18 DIAGNOSIS — O09.299 HX OF CERCLAGE, CURRENTLY PREGNANT: ICD-10-CM

## 2025-06-18 DIAGNOSIS — Z34.80 PRENATAL CARE, SUBSEQUENT PREGNANCY, UNSPECIFIED TRIMESTER: Primary | ICD-10-CM

## 2025-06-18 DIAGNOSIS — O09.90 SUPERVISION OF HIGH RISK PREGNANCY, ANTEPARTUM: ICD-10-CM

## 2025-06-18 DIAGNOSIS — Z98.890 HX OF CERCLAGE, CURRENTLY PREGNANT: ICD-10-CM

## 2025-06-18 DIAGNOSIS — O09.899 HISTORY OF PRETERM DELIVERY, CURRENTLY PREGNANT: ICD-10-CM

## 2025-06-18 PROCEDURE — 76811 OB US DETAILED SNGL FETUS: CPT

## 2025-06-18 NOTE — NURSING NOTE
Tigre JORDAN Ceballos is a  at 19w3d who presents to [facility] for L2 ultrasound. Pt reports positive fetal movement. Pt denies bldg/lof/change in discharge, contractions, headache, vision changes, chest pain/SOB or edema. SBAR given to Dr. Nichols, see note in Epic.

## 2025-06-18 NOTE — PROGRESS NOTES
"Please see \"Imaging\" tab under \"Chart Review\" for details of today's visit.    Janice Nichols MD    "

## 2025-06-18 NOTE — PROGRESS NOTES
"Preceptor attestation:  Vital signs reviewed: /80 (BP Location: Left arm, Patient Position: Sitting, Cuff Size: Adult Regular)   Pulse 99   Temp 97.9  F (36.6  C) (Oral)   Resp 20   Ht 1.549 m (5' 1\")   Wt 68.9 kg (152 lb)   LMP 02/06/2025 (Approximate)   SpO2 98%   BMI 28.72 kg/m      Patient seen, evaluated, and discussed with the resident.  I verified the content of the note, which accurately reflects my assessment of the patient and the plan of care.    Supervising physician: Charley Jacobsen MD  [facility]  "

## 2025-06-18 NOTE — PROGRESS NOTES
Assessment & Plan  Tigre Ceballos is a 24 year old , at 19w3d weeks of pregnancy with DESTIN of 2025 by LMP.  Patient's history is high risk for the following reasons:   - Cervical cerclage with prior pregnancy d/t short cervix  - H/o 36-wk  d/t fetal intolerance of labor   - Prophylactic cervical cerclage placed with this pregnancy     # High risk concerns (medical problems, high risk surgical/obstetric history, high risk social situation, etc):    saw MFM prior to our visit. Comprehensive fetal US completed today without detection of fetal abnormalities; cervix appeared long and closed. Eh had no questions or concerns today. ROS negative.  - In addition to routine prenatal care, patient will need    - Declined genetic testing   - Urine culture every trimester    2025 - UC negative    2nd trimester on/after 2025    3rd trimester on/after 2025   - Reviewed  labor symptoms with  today   - 1-hr GCT, CBC, and syphilis screen after 2025   - Tdap after 8/10/2025   - GBS at 31 and 36 weeks    - TOLAC assessment with [facility] in 3rd trimester on/after 2025   - Cerclage removal by MFM at 36 wks on/after 10/12/2025   - Pap smear post-partum  - Problem list reviewed and updated.    Tigre was seen today for prenatal care.    Diagnoses and all orders for this visit:    Prenatal care, subsequent pregnancy, unspecified trimester    Supervision of high risk pregnancy, antepartum        Weight gain adequate: 3.629 kg (8 lb) to date, out of recommended total of 15-25 lbs (pregravid BMI 25-29.9)      Return to clinic in 4 weeks.    This patient precepted with Dr. Charley Jacobsen MD.     JENNIFFER GIORDANO MD RICHARD, PGY-2      Subjective  Concerns: None     ROS:  No - Headache  No - Changes in vision  No - Chest Pain  No - Shortness of Breath  No - Nausea   No - Vomiting  No - Abdominal pain   No - Contractions  No - Dysuria   No - Vaginal Discharge    No - Vaginal  "bleeding   No - Loss of Fluid   No - Extremity swelling   Present - Fetal movement       Patient Active Problem List   Diagnosis    Supervision of high risk pregnancy, antepartum    Cervical insufficiency in pregnancy, antepartum    Hx of  section    History of  delivery, currently pregnant    Cervical cerclage suture present, antepartum       Eh JORDAN Pinto speaks English so an  was not used today.    Guidance:  signs of miscarriage  OTC medications  genetic testing  weight gain and exercise  quickening  child birth education    Going to [employer]? Possibly, if eligible for services.     Objective  /80 (BP Location: Left arm, Patient Position: Sitting, Cuff Size: Adult Regular)   Pulse 99   Temp 97.9  F (36.6  C) (Oral)   Resp 20   Ht 1.549 m (5' 1\")   Wt 68.9 kg (152 lb)   LMP 2025 (Approximate)   SpO2 98%   BMI 28.72 kg/m    No distress.  Gravid abdomen.  .  Fundal height: not performed, <20 wks gestation.  No edema.    Results  Blood type: B POS  Results for orders placed or performed in visit on 25   [facility] US Comprehensive Single     Status: None    Narrative            Comprehensive  ---------------------------------------------------------------------------------------------------------  Pat. Name: ANDREA PINTO       Study Date:  2025 8:00am  Pat. NO:  [id number]        Referring  MD: PHIL TURPIN  Site:         Sonographer: Sharon Salazar RDMS  :  2000        Age:   24  ---------------------------------------------------------------------------------------------------------    INDICATION  ---------------------------------------------------------------------------------------------------------  history indicated cerclage placed on 25  History of exam indicated cerclage in prior pregnancy  History of PPROM with  delivery (36 weeks)  Prior  section x " 1      METHOD  ---------------------------------------------------------------------------------------------------------  Transabdominal ultrasound examination. View: Sufficient      PREGNANCY  ---------------------------------------------------------------------------------------------------------  Theodore pregnancy. Number of fetuses: 1      DATING  ---------------------------------------------------------------------------------------------------------                                           Date                                Details                                                                                      Gest. age                      DESTIN  LMP                                  2/6/2025                          Cycle: LMP date uncertain                                                          18 w + 6 d                     11/13/2025  Previous U/S                      5/6/2025                          GA, GA 13 w + 2 d                                                                     19 w + 3 d                     11/9/2025  U/S                                   6/18/2025                         based upon AC, BPD, Femur, HC                                                20 w + 1 d                     11/4/2025  Assigned dating                  based on ultrasound (GA), selected on 05/16/2025                                                              19 w + 3 d                     11/9/2025      GENERAL EVALUATION  ---------------------------------------------------------------------------------------------------------  Cardiac activity present.  bpm. Fetal movements: present. Presentation: cephalic  Placenta: Anterior, No Previa, > 2 cm from internal os  Umbilical cord: 3 vessel cord  Amniotic fluid: Amount of AF: normal. MVP 4.0 cm      FETAL BIOMETRY  ---------------------------------------------------------------------------------------------------------  BPD                                                          47.0                    mm                         20w 1d                                                Hadlock  OFD                                                         61.3                    mm                         19w 6d                                                Nicolaides  HC                                                           172.4                  mm                         19w 6d                                                 Hadlock  Cerebellum tr                                            20.5                    mm                         19w 4d                                                 Nicolaides  Nuchal fold                                                4.3                     mm  AC                                                           149.0                  mm                         20w 1d                      70%                    Hadlock  Femur                                                      32.4                    mm                         20w 1d                                                 Hadlock  Humerus                                                   28.4                   mm                          19w 1d                                                Lisestte  Fetal Weight Calculation:  EFW                                                        333                     g                                                             81%                    Hadlock  EFW (lb,oz)                                              0 lb 12                 oz  EFW by                                                     Hadlock (BPD-HC-AC-FL)  Head / Face / Neck Biometry:                                                              4.8                     mm  CM                                                           3.3                     mm  Nasal bone                                               5.8                       mm  Urinary Tract Biometry:  Rt Renal pelvis ap                                      1.4                     mm  Lt Renal pelvis ap                                      1.1                      mm      FETAL ANATOMY  ---------------------------------------------------------------------------------------------------------  The following structures appear normal:  Head / Neck                         Cranium. Head size. Head shape. Lateral ventricles. Choroid plexus. Midline falx. Cavum septi pellucidi. Cerebellum. Cisterna magna.                                             Parenchyma. Thalami. Vermis.                                             Neck. Nuchal fold.  Face                                   Lips. Profile. Nose. Maxilla. Mandible. Orbits. Lens.  Heart / Thorax                      4-chamber view. RVOT view. LVOT view. 3-vessel view. 3-vessel-trachea view. Situs. Aortic arch view. Bicaval view. Ductal arch view. Superior                                             vena cava. Inferior vena cava. Cardiac position. Cardiac size. Cardiac rhythm.                                             Right lung. Left lung. Diaphragm.  Abdomen                             Abdom. wall. Cord insertion. Stomach. Kidneys. Bladder. Liver. Bowel. Genitals.  Spine                                  Cervical spine. Thoracic spine. Lumbar spine. Sacral spine.  Extremities / Skeleton          Arms. Right arm. Right hand. Left hand. Legs. Right leg. Right foot. Left leg. Left foot.    The following structures were documented previously:  Extremities / Skeleton          Left arm.      MATERNAL STRUCTURES  ---------------------------------------------------------------------------------------------------------  Cervix                                  Visualized                                             Appearance: Appears Closed                                             Cervical length 33.1 mm  Right Ovary                           Visualized  Left Ovary                            Visualized      RECOMMENDATION  ---------------------------------------------------------------------------------------------------------  I discussed the findings on today's ultrasound with the patient. I reviewed the limitations of ultrasound both in detecting aneuploidy and structural abnormalities. Ultrasound  can routinely detect 80-90% of structural abnormalities. She has not had genetic screening this pregnancy. She is doing well after her cerclage placement and denies any  s/s of PTL.    Further ultrasound studies as clinically indicated.    Return to primary provider for continued prenatal care.    If you have questions regarding today's evaluation or if we can be of further service, please contact the [facility].    **Fetal anomalies may be present but not detected**    I spent a total of 10 minutes (excluding the ultrasound interpretation) on the date of this encounter including preparing to see the patient (reviewing medical records/tests),  in direct face-to-face contact with the patient during the visit counseling and discussing the plan of care and documenting the visit in the electronic medical record.        Impression    IMPRESSION  ---------------------------------------------------------------------------------------------------------  1. Theodore pregnancy at 19w 3d gestational age.  2. No fetal anomalies commonly detected by ultrasound were identified in the detailed fetal anatomic survey within the limits of prenatal ultrasound.  3. Growth parameters and estimated fetal weight were consistent with gestational age predicted by assigned DESTIN.  4. The amniotic fluid volume appeared normal.  5. On transabdominal imaging the cervix appeared long and closed.

## 2025-07-16 ENCOUNTER — OFFICE VISIT (OUTPATIENT)
Dept: FAMILY MEDICINE | Facility: CLINIC | Age: 25
End: 2025-07-16
Payer: COMMERCIAL

## 2025-07-16 VITALS
DIASTOLIC BLOOD PRESSURE: 71 MMHG | WEIGHT: 157.2 LBS | RESPIRATION RATE: 20 BRPM | HEART RATE: 80 BPM | BODY MASS INDEX: 29.7 KG/M2 | SYSTOLIC BLOOD PRESSURE: 113 MMHG | TEMPERATURE: 97.8 F | OXYGEN SATURATION: 99 %

## 2025-07-16 DIAGNOSIS — Z12.4 CERVICAL CANCER SCREENING: Primary | ICD-10-CM

## 2025-07-16 DIAGNOSIS — O09.90 SUPERVISION OF HIGH RISK PREGNANCY, ANTEPARTUM: ICD-10-CM

## 2025-07-16 DIAGNOSIS — Z34.80 PRENATAL CARE, SUBSEQUENT PREGNANCY, UNSPECIFIED TRIMESTER: ICD-10-CM

## 2025-07-16 PROCEDURE — 87086 URINE CULTURE/COLONY COUNT: CPT

## 2025-07-16 PROCEDURE — 99207 PR PRENATAL VISIT: CPT | Mod: GC

## 2025-07-16 PROCEDURE — 3078F DIAST BP <80 MM HG: CPT

## 2025-07-16 PROCEDURE — 3074F SYST BP LT 130 MM HG: CPT

## 2025-07-16 NOTE — PATIENT INSTRUCTIONS
Please complete the following lab tests after 2025:    CBC  Glucose tolerance test, 1 hr  Syphilis Screen    For Trial of Labor after  evaluation, please schedule a date after 2025, for evaluation. A referral will be placed today.

## 2025-07-16 NOTE — PROGRESS NOTES
Assessment & Plan  Tigre Ceballos is a 25 year old , at 23w6d weeks of pregnancy with DESTIN of 2025 by LMP. Patient's history is high risk for the following reasons:   Cervical cerclage with prior pregnancy due to short cervix  H/o 36-wk  due to fetal intolerance of labor  Prophylactic cervical cerclage placed with this pregnancy    # High risk concerns (medical problems, high risk surgical/obstetric history, high risk social situation, etc):  Eh reports doing well.  Provided anticipatory information, primarily focusing on upcoming labs.  Negative ROS; positive fetal movement.   In addition to routine prenatal care, patient will need:  Urine culture every trimester  Repeat today - negative; pt informed of result via [employer] message  3rd trimester on/after 2025  1-hr GCT, CBC, and syphilis screen after 2025; Tigre plans to complete these labs on the same day of her next appointment on 8/15/2025. Scheduled lab appointment for 3:10 to ensure sufficient time to complete 1-hr GCT.   T-dap after 8/10/2025  GBS at 31 and 36 wks  TOLAC assessment with [facility] in 3rd trimester on/after 2025, ordered  Cerclage removal by MFM at 36 wks on/after 10/12/2025  Pap smear post-partum  Problem list reviewed and updated.    Tigre was seen today for prenatal care.    Diagnoses and all orders for this visit:    Cervical cancer screening    Prenatal care, subsequent pregnancy, unspecified trimester  -     Glucose tolerance gest screen 1 hour; Future  -     CBC with Platelets & Differential; Future  -     Syphilis Screen [facility] (RPR/VDRL); Future  -     Urine Culture    Supervision of high risk pregnancy, antepartum        Weight gain adequate: 5.987 kg (13 lb 3.2 oz) to date, out of recommended total of 15-25 lbs (pregravid BMI 25-29.9)    Patient Instructions   Please complete the following lab tests after 2025:    CBC  Glucose tolerance test, 1 hr  Syphilis Screen    For Trial of Labor  after  evaluation, please schedule a date after 2025, for evaluation. A referral will be placed today.     Return to clinic on 8/15/2025, including lab work.    This patient precepted with Dr. Kelly Hensley MD.    Lawrence Eckert MD [facility]ARD, PGY-3      Subjective  Concerns: None     ROS:  No - Headache  No - Changes in vision  No - Chest Pain  No - Shortness of Breath  No - Nausea   No - Vomiting  No - Abdominal pain   No - Contractions  No - Dysuria   No - Vaginal Discharge    No - Vaginal bleeding   No - Loss of Fluid   No - Extremity swelling   Present - Fetal movement       Patient Active Problem List   Diagnosis    Supervision of high risk pregnancy, antepartum    Cervical insufficiency in pregnancy, antepartum    Hx of  section    History of  delivery, currently pregnant    Cervical cerclage suture present, antepartum       Eh G Lin speaks English so an  was not used today.    Guidance:  Upcoming labs, referral for TOLAC assessment, and T-dap vaccine at next visit.     Going to [facility]? TBD    Objective  /71   Pulse 80   Temp 97.8  F (36.6  C) (Tympanic)   Resp 20   Wt 71.3 kg (157 lb 3.2 oz)   LMP 2025 (Approximate)   SpO2 99%   BMI 29.70 kg/m    No distress.  Gravid abdomen.  .  Fundal height 23 cm.  No edema.    Results  Blood type: B POS  Results for orders placed or performed in visit on 25   Urine Culture     Status: None    Specimen: Urine, Midstream   Result Value Ref Range    Culture No Growth

## 2025-07-16 NOTE — PROGRESS NOTES
Preceptor Attestation:  Vitals:    07/16/25 0908   BP: 113/71   Pulse: 80   Resp: 20   Temp: 97.8  F (36.6  C)   TempSrc: Tympanic   SpO2: 99%   Weight: 71.3 kg (157 lb 3.2 oz)          I discussed the patient with the resident and evaluated the patient in person. I have verified the content of the note, which accurately reflects my assessment of the patient and the plan of care.   Supervising Physician:  Kelly Hensley MD

## 2025-07-17 LAB — BACTERIA UR CULT: NO GROWTH

## 2025-07-21 ENCOUNTER — PATIENT OUTREACH (OUTPATIENT)
Dept: CARE COORDINATION | Facility: CLINIC | Age: 25
End: 2025-07-21
Payer: COMMERCIAL

## 2025-08-15 ENCOUNTER — OFFICE VISIT (OUTPATIENT)
Dept: FAMILY MEDICINE | Facility: CLINIC | Age: 25
End: 2025-08-15
Payer: COMMERCIAL

## 2025-08-15 VITALS
RESPIRATION RATE: 20 BRPM | BODY MASS INDEX: 32.54 KG/M2 | DIASTOLIC BLOOD PRESSURE: 74 MMHG | OXYGEN SATURATION: 99 % | TEMPERATURE: 97.2 F | SYSTOLIC BLOOD PRESSURE: 116 MMHG | HEART RATE: 94 BPM | WEIGHT: 172.2 LBS

## 2025-08-15 DIAGNOSIS — O09.90 SUPERVISION OF HIGH RISK PREGNANCY, ANTEPARTUM: ICD-10-CM

## 2025-08-15 DIAGNOSIS — Z34.80 PRENATAL CARE, SUBSEQUENT PREGNANCY, UNSPECIFIED TRIMESTER: ICD-10-CM

## 2025-08-15 DIAGNOSIS — Z12.4 CERVICAL CANCER SCREENING: Primary | ICD-10-CM

## 2025-08-15 LAB
BASOPHILS # BLD AUTO: 0.04 10E3/UL (ref 0–0.2)
BASOPHILS NFR BLD AUTO: 0.4 %
EOSINOPHIL # BLD AUTO: 0.08 10E3/UL (ref 0–0.7)
EOSINOPHIL NFR BLD AUTO: 0.8 %
ERYTHROCYTE [DISTWIDTH] IN BLOOD BY AUTOMATED COUNT: 12.2 % (ref 10–15)
GLUCOSE 1H P 50 G GLC PO SERPL-MCNC: 104 MG/DL (ref 70–129)
HCT VFR BLD AUTO: 35.1 % (ref 35–47)
HGB BLD-MCNC: 11.9 G/DL (ref 11.7–15.7)
IMM GRANULOCYTES # BLD: 0.06 10E3/UL
IMM GRANULOCYTES NFR BLD: 0.6 %
LYMPHOCYTES # BLD AUTO: 1.7 10E3/UL (ref 0.8–5.3)
LYMPHOCYTES NFR BLD AUTO: 17.7 %
MCH RBC QN AUTO: 29.6 PG (ref 26.5–33)
MCHC RBC AUTO-ENTMCNC: 33.9 G/DL (ref 31.5–36.5)
MCV RBC AUTO: 87.3 FL (ref 78–100)
MONOCYTES # BLD AUTO: 0.78 10E3/UL (ref 0–1.3)
MONOCYTES NFR BLD AUTO: 8.1 %
NEUTROPHILS # BLD AUTO: 6.97 10E3/UL (ref 1.6–8.3)
NEUTROPHILS NFR BLD AUTO: 72.4 %
NRBC # BLD AUTO: <0.03 10E3/UL
NRBC BLD AUTO-RTO: 0 /100
PLATELET # BLD AUTO: 255 10E3/UL (ref 150–450)
RBC # BLD AUTO: 4.02 10E6/UL (ref 3.8–5.2)
WBC # BLD AUTO: 9.63 10E3/UL (ref 4–11)

## 2025-08-15 PROCEDURE — 85025 COMPLETE CBC W/AUTO DIFF WBC: CPT | Performed by: STUDENT IN AN ORGANIZED HEALTH CARE EDUCATION/TRAINING PROGRAM

## 2025-08-15 PROCEDURE — 82950 GLUCOSE TEST: CPT | Performed by: STUDENT IN AN ORGANIZED HEALTH CARE EDUCATION/TRAINING PROGRAM

## 2025-08-15 PROCEDURE — 86762 RUBELLA ANTIBODY: CPT | Performed by: STUDENT IN AN ORGANIZED HEALTH CARE EDUCATION/TRAINING PROGRAM

## 2025-08-15 PROCEDURE — 86780 TREPONEMA PALLIDUM: CPT | Performed by: STUDENT IN AN ORGANIZED HEALTH CARE EDUCATION/TRAINING PROGRAM

## 2025-08-15 PROCEDURE — 36415 COLL VENOUS BLD VENIPUNCTURE: CPT | Performed by: STUDENT IN AN ORGANIZED HEALTH CARE EDUCATION/TRAINING PROGRAM

## 2025-08-16 LAB
RUBV IGG SERPL QL IA: 3.89 INDEX
RUBV IGG SERPL QL IA: POSITIVE
T PALLIDUM AB SER QL: NONREACTIVE

## 2025-08-18 ENCOUNTER — TELEPHONE (OUTPATIENT)
Dept: FAMILY MEDICINE | Facility: CLINIC | Age: 25
End: 2025-08-18
Payer: COMMERCIAL

## 2025-08-18 DIAGNOSIS — Z98.891 HX OF CESAREAN SECTION: Primary | ICD-10-CM

## 2025-08-27 ENCOUNTER — OFFICE VISIT (OUTPATIENT)
Dept: FAMILY MEDICINE | Facility: CLINIC | Age: 25
End: 2025-08-27
Payer: COMMERCIAL

## 2025-08-27 VITALS
HEART RATE: 95 BPM | RESPIRATION RATE: 12 BRPM | HEIGHT: 62 IN | DIASTOLIC BLOOD PRESSURE: 67 MMHG | OXYGEN SATURATION: 96 % | BODY MASS INDEX: 32.02 KG/M2 | WEIGHT: 174 LBS | TEMPERATURE: 98.1 F | SYSTOLIC BLOOD PRESSURE: 109 MMHG

## 2025-08-27 DIAGNOSIS — O09.90 SUPERVISION OF HIGH RISK PREGNANCY, ANTEPARTUM: ICD-10-CM

## 2025-08-27 DIAGNOSIS — Z34.80 PRENATAL CARE, SUBSEQUENT PREGNANCY, UNSPECIFIED TRIMESTER: Primary | ICD-10-CM

## (undated) DEVICE — PLATE GROUNDING ADULT W/CORD 9165L

## (undated) DEVICE — PREP POVIDONE IODINE USP 7.5% CLEANING SOL 64538161

## (undated) DEVICE — GLOVE PROTEXIS BLUE W/NEU-THERA 7.5  2D73EB75

## (undated) DEVICE — SPONGE RAY-TEC 4X8" 7318

## (undated) DEVICE — CATH TRAY FOLEY 16FR BARDEX W/DRAIN BAG STATLOCK 300316A

## (undated) DEVICE — SOL WATER IRRIG 1000ML BOTTLE 2F7114

## (undated) DEVICE — LUBRICATING JELLY 2.7GM T00137

## (undated) DEVICE — SUCTION MANIFOLD NEPTUNE 2 SYS 1 PORT 702-025-000

## (undated) DEVICE — GOWN IMPERVIOUS BREATHABLE SMART LG 89015

## (undated) DEVICE — GLOVE SURG PI ULTRA TOUCH M SZ 6-1/2 LF

## (undated) DEVICE — SOL NACL 0.9% IRRIG 1000ML BOTTLE 2F7124

## (undated) DEVICE — SYR BULB IRRIG 50ML LATEX FREE 0035280

## (undated) DEVICE — DRESSING MEPILEX AG SILVER 4X8 395890

## (undated) DEVICE — SUCTION TIP YANKAUER W/O VENT K86

## (undated) DEVICE — DRAPE GYN/UROLOGY FLUID POUCH TUR 29455

## (undated) DEVICE — BLADE CLIPPER PIVOT PURPLE DISP 9660

## (undated) DEVICE — DRAPE IOBAN 2 C-SECTION 3M 6697

## (undated) DEVICE — PREP CHLORAPREP 26ML TINTED HI-LITE ORANGE 930815

## (undated) DEVICE — CUSTOM CATH LAB BASIN SET PACK 640PACK LHE SUTCNBPFCA

## (undated) DEVICE — SURGICEL POWDER ABSORBABLE HEMOSTAT 3GM 3013SP

## (undated) DEVICE — NDL COUNTER 20CT 31142493

## (undated) DEVICE — SU ETHILON 2 CT-2 30" D-6865

## (undated) DEVICE — GLOVE ESTEEM POWDER FREE SMT 7.5  2D72PT75

## (undated) DEVICE — GLOVE SURG PI ULTRA TOUCH M SZ 8 LF

## (undated) DEVICE — PACK MAJOR BASIN 673

## (undated) DEVICE — Device

## (undated) DEVICE — SOL WATER IRRIG 1000ML BOTTLE 07139-09

## (undated) DEVICE — SUTURE VICRYL+ 3-0 27IN CT-1 UND VCP258H

## (undated) DEVICE — TUBING SUCTION 10'X3/16" N510

## (undated) DEVICE — PREP POVIDONE IODINE USP 10% TOPICAL SOL 64537161

## (undated) DEVICE — PREP CHLORHEXIDINE 4% 32OZ

## (undated) DEVICE — GLOVE SURG PI ULTRA TOUCH M SZ 7 LF 42670

## (undated) DEVICE — CUSTOM PACK C-SECTION LHE

## (undated) DEVICE — PREP SKIN SCRUB TRAY 4461A

## (undated) DEVICE — STPL SKIN SUBCUTICULAR INSORB  2030

## (undated) DEVICE — SUTURE PDS 0 60IN CTX+ LOOPED PDP990G

## (undated) DEVICE — GOWN IMPERVIOUS BREATHABLE 2XL/XLONG

## (undated) DEVICE — SU CHROMIC 0 CT 36" 914H

## (undated) DEVICE — LIGHT HANDLE X2

## (undated) DEVICE — DRAPE POUCH IRR 1016

## (undated) RX ORDER — OXYTOCIN/0.9 % SODIUM CHLORIDE 30/500 ML
PLASTIC BAG, INJECTION (ML) INTRAVENOUS
Status: DISPENSED
Start: 2022-02-04

## (undated) RX ORDER — DEXAMETHASONE SODIUM PHOSPHATE 10 MG/ML
INJECTION, EMULSION INTRAMUSCULAR; INTRAVENOUS
Status: DISPENSED
Start: 2022-02-04

## (undated) RX ORDER — ONDANSETRON 2 MG/ML
INJECTION INTRAMUSCULAR; INTRAVENOUS
Status: DISPENSED
Start: 2022-02-04

## (undated) RX ORDER — PHENYLEPHRINE HCL IN 0.9% NACL 50MG/250ML
PLASTIC BAG, INJECTION (ML) INTRAVENOUS
Status: DISPENSED
Start: 2025-05-21

## (undated) RX ORDER — KETOROLAC TROMETHAMINE 30 MG/ML
INJECTION, SOLUTION INTRAMUSCULAR; INTRAVENOUS
Status: DISPENSED
Start: 2022-02-04

## (undated) RX ORDER — KETOROLAC TROMETHAMINE 30 MG/ML
INJECTION, SOLUTION INTRAMUSCULAR; INTRAVENOUS
Status: DISPENSED
Start: 2021-10-25

## (undated) RX ORDER — PHENYLEPHRINE HCL IN 0.9% NACL 50MG/250ML
PLASTIC BAG, INJECTION (ML) INTRAVENOUS
Status: DISPENSED
Start: 2021-10-25

## (undated) RX ORDER — CEFAZOLIN SODIUM 1 G/3ML
INJECTION, POWDER, FOR SOLUTION INTRAMUSCULAR; INTRAVENOUS
Status: DISPENSED
Start: 2022-02-04

## (undated) RX ORDER — MORPHINE SULFATE 1 MG/ML
INJECTION, SOLUTION EPIDURAL; INTRATHECAL; INTRAVENOUS
Status: DISPENSED
Start: 2022-02-04

## (undated) RX ORDER — ONDANSETRON 2 MG/ML
INJECTION INTRAMUSCULAR; INTRAVENOUS
Status: DISPENSED
Start: 2021-10-25